# Patient Record
Sex: MALE | Race: WHITE | NOT HISPANIC OR LATINO | URBAN - METROPOLITAN AREA
[De-identification: names, ages, dates, MRNs, and addresses within clinical notes are randomized per-mention and may not be internally consistent; named-entity substitution may affect disease eponyms.]

---

## 2017-05-01 ENCOUNTER — AMBULATORY SURGICAL CENTER (OUTPATIENT)
Dept: URBAN - METROPOLITAN AREA SURGERY 9 | Facility: SURGERY | Age: 52
End: 2017-05-01
Payer: COMMERCIAL

## 2017-05-01 DIAGNOSIS — D12.3 BENIGN NEOPLASM OF TRANSVERSE COLON: ICD-10-CM

## 2017-05-01 DIAGNOSIS — Z12.11 ENCOUNTER FOR SCREENING FOR MALIGNANT NEOPLASM OF COLON: ICD-10-CM

## 2017-05-01 DIAGNOSIS — K63.5 POLYP OF COLON: ICD-10-CM

## 2017-05-01 PROCEDURE — 45380 COLONOSCOPY AND BIOPSY: CPT | Mod: 33

## 2017-05-01 PROCEDURE — 88305 TISSUE EXAM BY PATHOLOGIST: CPT | Performed by: INTERNAL MEDICINE

## 2017-05-02 ENCOUNTER — LAB REQUISITION (OUTPATIENT)
Dept: LAB | Facility: HOSPITAL | Age: 52
End: 2017-05-02

## 2017-05-02 DIAGNOSIS — Z12.11 ENCOUNTER FOR SCREENING FOR MALIGNANT NEOPLASM OF COLON: ICD-10-CM

## 2017-05-02 LAB
CYTO UR: NORMAL
LAB AP CASE REPORT: NORMAL
LAB AP CLINICAL INFORMATION: NORMAL
Lab: NORMAL
PATH REPORT.FINAL DX SPEC: NORMAL
PATH REPORT.GROSS SPEC: NORMAL

## 2017-07-31 ENCOUNTER — TRANSCRIBE ORDERS (OUTPATIENT)
Dept: ADMINISTRATIVE | Facility: HOSPITAL | Age: 52
End: 2017-07-31

## 2017-07-31 DIAGNOSIS — R07.9 CHEST PAIN, UNSPECIFIED: Primary | ICD-10-CM

## 2017-08-03 ENCOUNTER — HOSPITAL ENCOUNTER (OUTPATIENT)
Dept: CARDIOLOGY | Facility: HOSPITAL | Age: 52
Discharge: HOME | End: 2017-08-03
Attending: INTERNAL MEDICINE | Admitting: INTERNAL MEDICINE

## 2017-08-03 VITALS
BODY MASS INDEX: 23.7 KG/M2 | SYSTOLIC BLOOD PRESSURE: 132 MMHG | OXYGEN SATURATION: 100 % | DIASTOLIC BLOOD PRESSURE: 84 MMHG | HEART RATE: 55 BPM | WEIGHT: 175 LBS | HEIGHT: 72 IN | RESPIRATION RATE: 18 BRPM

## 2017-08-03 DIAGNOSIS — R07.9 CHEST PAIN, UNSPECIFIED: ICD-10-CM

## 2017-08-03 PROCEDURE — 93017 CV STRESS TEST TRACING ONLY: CPT

## 2017-08-03 PROCEDURE — 93016 CV STRESS TEST SUPVJ ONLY: CPT | Performed by: INTERNAL MEDICINE

## 2017-08-03 PROCEDURE — 93018 CV STRESS TEST I&R ONLY: CPT | Performed by: INTERNAL MEDICINE

## 2017-08-04 LAB
BH CV STRESS BP STAGE 1: NORMAL
BH CV STRESS BP STAGE 2: NORMAL
BH CV STRESS BP STAGE 3: NORMAL
BH CV STRESS BP STAGE 4: NORMAL
BH CV STRESS DURATION MIN STAGE 1: 3
BH CV STRESS DURATION MIN STAGE 2: 3
BH CV STRESS DURATION MIN STAGE 3: 3
BH CV STRESS DURATION MIN STAGE 4: 1
BH CV STRESS DURATION SEC STAGE 1: 0
BH CV STRESS DURATION SEC STAGE 2: 0
BH CV STRESS DURATION SEC STAGE 3: 0
BH CV STRESS DURATION SEC STAGE 4: 21
BH CV STRESS GRADE STAGE 1: 10
BH CV STRESS GRADE STAGE 2: 12
BH CV STRESS GRADE STAGE 3: 14
BH CV STRESS GRADE STAGE 4: 16
BH CV STRESS HR STAGE 1: 85
BH CV STRESS HR STAGE 2: 130
BH CV STRESS HR STAGE 3: 126
BH CV STRESS HR STAGE 4: 141
BH CV STRESS METS STAGE 1: 5
BH CV STRESS METS STAGE 2: 7.5
BH CV STRESS METS STAGE 3: 10
BH CV STRESS METS STAGE 4: 13.5
BH CV STRESS PROTOCOL 1: NORMAL
BH CV STRESS RECOVERY BP: NORMAL MMHG
BH CV STRESS RECOVERY HR: 79 BPM
BH CV STRESS SPEED STAGE 1: 1.7
BH CV STRESS SPEED STAGE 2: 2.5
BH CV STRESS SPEED STAGE 3: 3.4
BH CV STRESS SPEED STAGE 4: 4.2
BH CV STRESS STAGE 1: 1
BH CV STRESS STAGE 2: 2
BH CV STRESS STAGE 3: 3
BH CV STRESS STAGE 4: 4
MAXIMAL PREDICTED HEART RATE: 170 BPM
PERCENT MAX PREDICTED HR: 89.41 %
STRESS BASELINE BP: NORMAL MMHG
STRESS BASELINE HR: 55 BPM
STRESS O2 SAT REST: 100 %
STRESS PERCENT HR: 105 %
STRESS POST ESTIMATED WORKLOAD: 12.4 METS
STRESS POST EXERCISE DUR MIN: 10 MIN
STRESS POST EXERCISE DUR SEC: 22 SEC
STRESS POST PEAK BP: NORMAL MMHG
STRESS POST PEAK HR: 152 BPM
STRESS TARGET HR: 145 BPM

## 2017-08-07 ENCOUNTER — OFFICE VISIT (OUTPATIENT)
Dept: CARDIOLOGY | Facility: CLINIC | Age: 52
End: 2017-08-07

## 2017-08-07 VITALS
WEIGHT: 179 LBS | DIASTOLIC BLOOD PRESSURE: 80 MMHG | BODY MASS INDEX: 24.24 KG/M2 | SYSTOLIC BLOOD PRESSURE: 122 MMHG | HEIGHT: 72 IN | HEART RATE: 53 BPM

## 2017-08-07 DIAGNOSIS — I20.0 UNSTABLE ANGINA (HCC): Primary | ICD-10-CM

## 2017-08-07 DIAGNOSIS — E78.5 HYPERLIPIDEMIA, UNSPECIFIED HYPERLIPIDEMIA TYPE: ICD-10-CM

## 2017-08-07 PROBLEM — R07.2 PRECORDIAL PAIN: Status: ACTIVE | Noted: 2017-08-07

## 2017-08-07 PROBLEM — R07.2 PRECORDIAL PAIN: Status: RESOLVED | Noted: 2017-08-07 | Resolved: 2017-08-07

## 2017-08-07 PROCEDURE — 99205 OFFICE O/P NEW HI 60 MIN: CPT | Performed by: INTERNAL MEDICINE

## 2017-08-07 PROCEDURE — 93000 ELECTROCARDIOGRAM COMPLETE: CPT | Performed by: INTERNAL MEDICINE

## 2017-08-07 RX ORDER — ATORVASTATIN CALCIUM 20 MG/1
20 TABLET, FILM COATED ORAL DAILY
Qty: 90 TABLET | Refills: 3 | Status: SHIPPED | OUTPATIENT
Start: 2017-08-07 | End: 2017-08-09

## 2017-08-07 NOTE — PROGRESS NOTES
Date of Office Visit: 2017  Encounter Provider: Paula Hall MD  Place of Service: [facility] CARDIOLOGY  Patient Name: Abram Allen  :1965      Patient ID:  Abram Allen is a 51 y.o. male is here for  Chest pain.         History of Present Illness    Dear Myla is here for chest pain.    He's been an exerciser lifelong.  He enjoys running.  Beginning  of 2017, he noticed about 1.5 miles into his running he began having chest heaviness going into his back and shoulder.  It would be progressively severe enough for he would have to stop.  After stopping for 1-2 minutes, the pain would get better but not jorje completely.  He would continue his run with a mild amount of chest pressure.  He has noticed that his energy level is low but he only gets about 4-5 hours of sleep nightly.  Even now he's noticed a low level of chest pressure.  He did have a treadmill stress study done because of his complaint.  This was done 8/3/2017 and showed a complete left bundle branch block with repolarization changes during exercise as well as a 7 beat run of supraventricular tachycardia and chest pain similar to the pain that he been experiencing with running.  His baseline ECG did show an incomplete left bundle-branch block.    He's had no syncope or palpitations.  He has no history of diabetes mellitus or hypertension.  He does have hyperlipidemia which is untreated.  He's never had myocardial infarction or heart failure.  He has no history of cancer, kidney disease, asthma, emphysema, seizures, stroke, sleep apnea, blood clots.    He doesn't smoke.  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  He is  and has 3 children who are healthy.  He has 6-8 cups of coffee per day.  His maternal grandmother  with a myocardial infarction in her mid 80s.    Past Medical History:   Diagnosis Date   • Hypertension          Past  Surgical History:   Procedure Laterality Date   • SHOULDER SURGERY Left        No current outpatient prescriptions on file prior to visit.     No current facility-administered medications on file prior to visit.        Social History     Social History   • Marital status:      Spouse name: N/A   • Number of children: N/A   • Years of education: N/A     Occupational History   • Not on file.     Social History Main Topics   • Smoking status: Never Smoker   • Smokeless tobacco: Not on file      Comment: caffeine use   • Alcohol use No   • Drug use: No   • Sexual activity: Not on file     Other Topics Concern   • Not on file     Social History Narrative   • No narrative on file           Review of Systems   Constitution: Positive for malaise/fatigue.   HENT: Negative for congestion and headaches.    Eyes: Negative for vision loss in left eye and vision loss in right eye.   Cardiovascular: Positive for dyspnea on exertion.   Respiratory: Negative.  Negative for cough, hemoptysis, shortness of breath, sleep disturbances due to breathing, snoring, sputum production and wheezing.    Endocrine: Negative.    Hematologic/Lymphatic: Negative.    Skin: Negative for poor wound healing and rash.   Musculoskeletal: Negative for falls, gout, muscle cramps and myalgias.   Gastrointestinal: Negative for abdominal pain, diarrhea, dysphagia, hematemesis, melena, nausea and vomiting.   Neurological: Negative for excessive daytime sleepiness, dizziness, light-headedness, loss of balance, seizures and vertigo.   Psychiatric/Behavioral: Negative for depression and substance abuse. The patient is not nervous/anxious.        Procedures    ECG 12 Lead  Date/Time: 8/7/2017 3:37 PM  Performed by: SINDY BERGMAN  Authorized by: SINDY BERGMAN   Comparison: compared with previous ECG   Similar to previous ECG  Rhythm: sinus rhythm  Conduction: incomplete LBBB  Clinical impression: abnormal ECG               Objective:     "  Vitals:    08/07/17 1507 08/07/17 1512   BP: 118/84 122/80   BP Location: Right arm Left arm   Patient Position: Sitting Sitting   Pulse: 53    Weight: 179 lb (81.2 kg)    Height: 72\" (182.9 cm)      Body mass index is 24.28 kg/(m^2).    Physical Exam   Constitutional: He is oriented to person, place, and time. He appears well-developed and well-nourished. No distress.   HENT:   Head: Normocephalic and atraumatic.   Eyes: Conjunctivae are normal. No scleral icterus.   Neck: Neck supple. No JVD present. Carotid bruit is not present. No thyromegaly present.   Cardiovascular: Normal rate, regular rhythm, S1 normal, S2 normal, normal heart sounds and intact distal pulses.   No extrasystoles are present. PMI is not displaced.  Exam reveals no gallop.    No murmur heard.  Pulses:       Carotid pulses are 2+ on the right side, and 2+ on the left side.       Radial pulses are 2+ on the right side, and 2+ on the left side.        Dorsalis pedis pulses are 2+ on the right side, and 2+ on the left side.        Posterior tibial pulses are 2+ on the right side, and 2+ on the left side.   Pulmonary/Chest: Effort normal and breath sounds normal. No respiratory distress. He has no wheezes. He has no rhonchi. He has no rales. He exhibits no tenderness.   Abdominal: Soft. Bowel sounds are normal. He exhibits no distension, no abdominal bruit and no mass. There is no tenderness.   Musculoskeletal: He exhibits no edema or deformity.   Lymphadenopathy:     He has no cervical adenopathy.   Neurological: He is alert and oriented to person, place, and time. No cranial nerve deficit.   Skin: Skin is warm and dry. No rash noted. He is not diaphoretic. No cyanosis. No pallor. Nails show no clubbing.   Psychiatric: He has a normal mood and affect. Judgment normal.   Vitals reviewed.      Lab Review:       Assessment:      Diagnosis Plan   1. Unstable angina  Case Request Cath Lab: Coronary angiography   2. Hyperlipidemia, unspecified " hyperlipidemia type  Lipid Panel    Comprehensive Metabolic Panel    CBC (No Diff)    Case Request Cath Lab: Coronary angiography     1. Chest pain consistent with unstable angina and a LBBB on stress study as well as chest pressure, set up catheterization.  Risks and benefits explained.   2. Hyperlipidemia, untreated.   3. iLBBB on ECG.   4. High stress job with little sleep.     Plan:       See back in 4 weeks, start ASA 81mg daily and atorvastatin 20mg nightly.

## 2017-08-08 ENCOUNTER — LAB (OUTPATIENT)
Dept: LAB | Facility: HOSPITAL | Age: 52
End: 2017-08-08
Attending: INTERNAL MEDICINE

## 2017-08-08 DIAGNOSIS — E78.5 HYPERLIPIDEMIA, UNSPECIFIED HYPERLIPIDEMIA TYPE: ICD-10-CM

## 2017-08-08 LAB
ALBUMIN SERPL-MCNC: 4.4 G/DL (ref 3.5–5.2)
ALBUMIN/GLOB SERPL: 1.9 G/DL
ALP SERPL-CCNC: 50 U/L (ref 40–129)
ALT SERPL W P-5'-P-CCNC: 16 U/L (ref 5–41)
ANION GAP SERPL CALCULATED.3IONS-SCNC: 10.3 MMOL/L
AST SERPL-CCNC: 14 U/L (ref 5–40)
BILIRUB SERPL-MCNC: 0.5 MG/DL (ref 0.2–1.2)
BUN BLD-MCNC: 18 MG/DL (ref 6–20)
BUN/CREAT SERPL: 19.8 (ref 7–25)
CALCIUM SPEC-SCNC: 9 MG/DL (ref 8.6–10.5)
CHLORIDE SERPL-SCNC: 101 MMOL/L (ref 98–107)
CHOLEST SERPL-MCNC: 198 MG/DL (ref 0–200)
CO2 SERPL-SCNC: 26.7 MMOL/L (ref 22–29)
CREAT BLD-MCNC: 0.91 MG/DL (ref 0.76–1.27)
DEPRECATED RDW RBC AUTO: 37.8 FL (ref 37–54)
ERYTHROCYTE [DISTWIDTH] IN BLOOD BY AUTOMATED COUNT: 11.9 % (ref 11.5–14.5)
GFR SERPL CREATININE-BSD FRML MDRD: 88 ML/MIN/1.73
GLOBULIN UR ELPH-MCNC: 2.3 GM/DL
GLUCOSE BLD-MCNC: 99 MG/DL (ref 65–99)
HCT VFR BLD AUTO: 43.5 % (ref 42–52)
HDLC SERPL-MCNC: 48 MG/DL (ref 40–60)
HGB BLD-MCNC: 14.4 G/DL (ref 14–18)
LDLC SERPL CALC-MCNC: 135 MG/DL (ref 0–100)
LDLC/HDLC SERPL: 2.82 {RATIO}
MCH RBC QN AUTO: 29.3 PG (ref 27–31)
MCHC RBC AUTO-ENTMCNC: 33.1 G/DL (ref 31–37)
MCV RBC AUTO: 88.4 FL (ref 80–94)
PLATELET # BLD AUTO: 223 10*3/MM3 (ref 140–500)
PMV BLD AUTO: 11 FL (ref 7.4–10.4)
POTASSIUM BLD-SCNC: 3.9 MMOL/L (ref 3.5–5.2)
PROT SERPL-MCNC: 6.7 G/DL (ref 6–8.5)
RBC # BLD AUTO: 4.92 10*6/MM3 (ref 4.7–6.1)
SODIUM BLD-SCNC: 138 MMOL/L (ref 136–145)
TRIGL SERPL-MCNC: 74 MG/DL (ref 0–150)
VLDLC SERPL-MCNC: 14.8 MG/DL (ref 8–32)
WBC NRBC COR # BLD: 6.3 10*3/MM3 (ref 4.8–10.8)

## 2017-08-08 PROCEDURE — 80053 COMPREHEN METABOLIC PANEL: CPT | Performed by: INTERNAL MEDICINE

## 2017-08-08 PROCEDURE — 36415 COLL VENOUS BLD VENIPUNCTURE: CPT | Performed by: INTERNAL MEDICINE

## 2017-08-08 PROCEDURE — 85027 COMPLETE CBC AUTOMATED: CPT

## 2017-08-08 PROCEDURE — 80061 LIPID PANEL: CPT

## 2017-08-09 RX ORDER — ATORVASTATIN CALCIUM 20 MG/1
20 TABLET, FILM COATED ORAL DAILY
COMMUNITY
End: 2018-07-25 | Stop reason: SDUPTHER

## 2017-08-09 NOTE — PERIOPERATIVE NURSING NOTE
CALLED AND LEFT MSG ON VM, REMINDED OF ARRIVAL TIME AND NEED TO BE NPO AFTER [place]. MAY TAKE AM MEDS WITH A SIP OF WATER AND TOLD TO HAVE A RESPONSIBLE  IN THE EVENT OF D/C HOME

## 2017-08-10 ENCOUNTER — HOSPITAL ENCOUNTER (OUTPATIENT)
Facility: HOSPITAL | Age: 52
Setting detail: HOSPITAL OUTPATIENT SURGERY
Discharge: HOME | End: 2017-08-10
Attending: INTERNAL MEDICINE | Admitting: INTERNAL MEDICINE

## 2017-08-10 VITALS
RESPIRATION RATE: 16 BRPM | TEMPERATURE: 98.4 F | BODY MASS INDEX: 24.11 KG/M2 | HEART RATE: 53 BPM | OXYGEN SATURATION: 99 % | HEIGHT: 72 IN | SYSTOLIC BLOOD PRESSURE: 121 MMHG | DIASTOLIC BLOOD PRESSURE: 71 MMHG | WEIGHT: 178 LBS

## 2017-08-10 DIAGNOSIS — I20.0 UNSTABLE ANGINA (HCC): ICD-10-CM

## 2017-08-10 DIAGNOSIS — E78.5 HYPERLIPIDEMIA, UNSPECIFIED HYPERLIPIDEMIA TYPE: ICD-10-CM

## 2017-08-10 LAB — ACT BLD: 279 SECONDS (ref 82–152)

## 2017-08-10 PROCEDURE — 93010 ELECTROCARDIOGRAM REPORT: CPT | Performed by: INTERNAL MEDICINE

## 2017-08-10 PROCEDURE — C1725 CATH, TRANSLUMIN NON-LASER: HCPCS | Performed by: INTERNAL MEDICINE

## 2017-08-10 PROCEDURE — C1769 GUIDE WIRE: HCPCS | Performed by: INTERNAL MEDICINE

## 2017-08-10 PROCEDURE — 93458 L HRT ARTERY/VENTRICLE ANGIO: CPT | Performed by: INTERNAL MEDICINE

## 2017-08-10 PROCEDURE — 25010000002 FENTANYL CITRATE (PF) 100 MCG/2ML SOLUTION: Performed by: INTERNAL MEDICINE

## 2017-08-10 PROCEDURE — 92928 PRQ TCAT PLMT NTRAC ST 1 LES: CPT | Performed by: INTERNAL MEDICINE

## 2017-08-10 PROCEDURE — 25010000002 MIDAZOLAM PER 1 MG: Performed by: INTERNAL MEDICINE

## 2017-08-10 PROCEDURE — C1894 INTRO/SHEATH, NON-LASER: HCPCS | Performed by: INTERNAL MEDICINE

## 2017-08-10 PROCEDURE — 0 IOPAMIDOL PER 1 ML: Performed by: INTERNAL MEDICINE

## 2017-08-10 PROCEDURE — C9600 PERC DRUG-EL COR STENT SING: HCPCS | Performed by: INTERNAL MEDICINE

## 2017-08-10 PROCEDURE — C1887 CATHETER, GUIDING: HCPCS | Performed by: INTERNAL MEDICINE

## 2017-08-10 PROCEDURE — 25010000002 HEPARIN (PORCINE) PER 1000 UNITS: Performed by: INTERNAL MEDICINE

## 2017-08-10 PROCEDURE — C1874 STENT, COATED/COV W/DEL SYS: HCPCS | Performed by: INTERNAL MEDICINE

## 2017-08-10 PROCEDURE — 85347 COAGULATION TIME ACTIVATED: CPT

## 2017-08-10 PROCEDURE — 93005 ELECTROCARDIOGRAM TRACING: CPT | Performed by: INTERNAL MEDICINE

## 2017-08-10 DEVICE — XIENCE ALPINE EVEROLIMUS ELUTING CORONARY STENT SYSTEM 3.50 MM X 18 MM / RAPID-EXCHANGE
Type: IMPLANTABLE DEVICE | Site: CORONARY | Status: FUNCTIONAL
Brand: XIENCE ALPINE

## 2017-08-10 RX ORDER — VERAPAMIL HYDROCHLORIDE 2.5 MG/ML
INJECTION, SOLUTION INTRAVENOUS AS NEEDED
Status: DISCONTINUED | OUTPATIENT
Start: 2017-08-10 | End: 2017-08-10 | Stop reason: HOSPADM

## 2017-08-10 RX ORDER — LIDOCAINE HYDROCHLORIDE 10 MG/ML
0.1 INJECTION, SOLUTION EPIDURAL; INFILTRATION; INTRACAUDAL; PERINEURAL ONCE AS NEEDED
Status: DISCONTINUED | OUTPATIENT
Start: 2017-08-10 | End: 2017-08-10 | Stop reason: HOSPADM

## 2017-08-10 RX ORDER — SODIUM CHLORIDE 0.9 % (FLUSH) 0.9 %
1-10 SYRINGE (ML) INJECTION AS NEEDED
Status: CANCELLED | OUTPATIENT
Start: 2017-08-10

## 2017-08-10 RX ORDER — ASPIRIN 325 MG
TABLET ORAL AS NEEDED
Status: DISCONTINUED | OUTPATIENT
Start: 2017-08-10 | End: 2017-08-10 | Stop reason: HOSPADM

## 2017-08-10 RX ORDER — MIDAZOLAM HYDROCHLORIDE 1 MG/ML
INJECTION INTRAMUSCULAR; INTRAVENOUS AS NEEDED
Status: DISCONTINUED | OUTPATIENT
Start: 2017-08-10 | End: 2017-08-10 | Stop reason: HOSPADM

## 2017-08-10 RX ORDER — LIDOCAINE HYDROCHLORIDE 20 MG/ML
INJECTION, SOLUTION INFILTRATION; PERINEURAL AS NEEDED
Status: DISCONTINUED | OUTPATIENT
Start: 2017-08-10 | End: 2017-08-10 | Stop reason: HOSPADM

## 2017-08-10 RX ORDER — SODIUM CHLORIDE 9 MG/ML
INJECTION, SOLUTION INTRAVENOUS CONTINUOUS PRN
Status: DISCONTINUED | OUTPATIENT
Start: 2017-08-10 | End: 2017-08-10 | Stop reason: HOSPADM

## 2017-08-10 RX ORDER — SODIUM CHLORIDE 9 MG/ML
100 INJECTION, SOLUTION INTRAVENOUS CONTINUOUS
Status: CANCELLED | OUTPATIENT
Start: 2017-08-10 | End: 2017-08-10

## 2017-08-10 RX ORDER — SODIUM CHLORIDE 9 MG/ML
75 INJECTION, SOLUTION INTRAVENOUS CONTINUOUS
Status: DISCONTINUED | OUTPATIENT
Start: 2017-08-10 | End: 2017-08-10 | Stop reason: HOSPADM

## 2017-08-10 RX ORDER — FENTANYL CITRATE 50 UG/ML
INJECTION, SOLUTION INTRAMUSCULAR; INTRAVENOUS AS NEEDED
Status: DISCONTINUED | OUTPATIENT
Start: 2017-08-10 | End: 2017-08-10 | Stop reason: HOSPADM

## 2017-08-10 RX ORDER — NITROGLYCERIN 5 MG/ML
INJECTION, SOLUTION INTRAVENOUS AS NEEDED
Status: DISCONTINUED | OUTPATIENT
Start: 2017-08-10 | End: 2017-08-10 | Stop reason: HOSPADM

## 2017-08-10 RX ORDER — HEPARIN SODIUM 1000 [USP'U]/ML
INJECTION, SOLUTION INTRAVENOUS; SUBCUTANEOUS AS NEEDED
Status: DISCONTINUED | OUTPATIENT
Start: 2017-08-10 | End: 2017-08-10 | Stop reason: HOSPADM

## 2017-08-10 RX ORDER — SODIUM CHLORIDE 0.9 % (FLUSH) 0.9 %
1-10 SYRINGE (ML) INJECTION AS NEEDED
Status: DISCONTINUED | OUTPATIENT
Start: 2017-08-10 | End: 2017-08-10 | Stop reason: HOSPADM

## 2017-08-10 RX ADMIN — SODIUM CHLORIDE 75 ML/HR: 9 INJECTION, SOLUTION INTRAVENOUS at 08:42

## 2017-08-10 NOTE — PLAN OF CARE
Problem: Patient Care Overview (Adult)  Goal: Plan of Care Review  Outcome: Outcome(s) achieved Date Met:  08/10/17  Goal: Adult Individualization and Mutuality  Outcome: Outcome(s) achieved Date Met:  08/10/17  Goal: Discharge Needs Assessment  Outcome: Outcome(s) achieved Date Met:  08/10/17    Problem: Cardiac Catheterization with/without PCI (Adult)  Goal: Signs and Symptoms of Listed Potential Problems Will be Absent or Manageable (Cardiac Catheterization with/without PCI)  Outcome: Outcome(s) achieved Date Met:  08/10/17

## 2017-08-10 NOTE — PERIOPERATIVE NURSING NOTE
Cardiac rehab at bedside with patient and wife.  Patient has rehab set up at [facility]

## 2017-08-10 NOTE — DISCHARGE INSTRUCTIONS
SEDATION DISCHARGE INSTRUCTIONS.    IMPORTANT: The following information will help you return to your best level of health.  Sedation.  You have had a procedure that called for some medicine to reduce anxiety and pain. This medicine (or medicines) is called  sedation. After receiving the medicine, you may be sleepy, but able to breathe on your own. The effects of the medicine may last for several hours.    Follow these instructions after sedation:   Go right home. Rest quietly at home today, then you can be up and about.   Do not drink alcohol, drive or operate machinery for 24 hours.   Do not do anything where light-headedness or clumsiness would be dangerous.   Do not make important decisions or sign any legal papers for the next 24 hours.   Make sure A RESPONSIBLE PERSON stays with you the rest of today and overnight for your protection and safety.   Start your diet with fluids and light foods (jello, soup, juice, toast). Then, slowly progress to your usual diet if you are not sick to your stomach.    Call your doctor if you have:   a gray or blue skin color.   excess sleepiness.   repeated vomiting.   trouble breathing.   any new problems or concerns.        [facility]  [address] [place] [address]    Coronary Angiogram with Stent (Radial Approach) After Care    Refer to this sheet in the next few weeks. These instructions provide you with information on caring for yourself after your procedure. Your health care provider may also give you more specific instructions. Your treatment has been planned according to current medical practices, but problems sometimes occur. Call your health care provider if you have any problems or questions after your procedure.       Home Care Instructions:  · You may shower the day after the procedure. Remove the bandage (dressing) and gently wash the site with plain soap and water. Gently pat the site dry. You may apply a band aid daily for 2 days if desired.     · Do not apply powder or lotion to the site.  · Do not submerge the affected site in water for 3 to 5 days or until the site is completely healed.   · Do not flex or bend the affected arm for 24 hours.  · Do not lift, push or pull anything over 10 pounds for 2 days after your procedure.  · Do not operate machinery or power tools for 24 hours.  · Inspect the site at least twice daily. You may notice some bruising at the site and it may be tender for 1 to 2 weeks.     · Increase your fluid intake for the next 2 days.    · Keep arm elevated for 24 hours.  For the remainder of the day, keep your arm in the “Pledge of Allegiance” position when up and about.    · Limit your activity for the next 48 hours and avoid strenuous activity as directed by your physician.   · Cardiac Rehab may or may not be ordered.  Please consult with your physician  · You may drive 24 hours after the procedure unless otherwise instructed by your caregiver.  · A responsible adult should be with you for the first 24 hours after you arrive home.   · Do not make any important legal decisions or sign legal papers for 24 hours.    · Take medicines only as directed by your health care provider.  Blood thinners may be prescribed after your procedure to improve blood flow through the stent.    · Eat a heart-healthy diet. This should include plenty of fresh fruits and vegetables. Meat should be lean cuts. Avoid the following types of food:    ¨ Food that is high in salt.    ¨ Canned or highly processed food.    ¨ Food that is high in saturated fat or sugar.    ¨ Fried food.    · Make any other lifestyle changes recommended by your health care provider. This may include:    ¨ Not using any tobacco products including cigarettes, chewing tobacco, or electronic cigarettes.   ¨ Managing your weight.    ¨ Getting regular exercise.    ¨ Managing your blood pressure.    ¨ Limiting your alcohol intake.    ¨ Managing other health problems, such as diabetes.     · If you need an MRI after your heart stent was placed, be sure to tell the health care provider who orders the MRI that you have a heart stent.    · Keep all follow-up visits as directed by your health care provider.    ·   Call Your Doctor If:  · You have unusual pain at the radial/ulnar (wrist) site.  · You have redness, warmth, swelling, or pain at the radial/ulnar (wrist) site.  · You have drainage (other than a small amount of blood on the dressing).  · `You have chills or a fever > 101.  · Your arm becomes pale or dark, cool, tingly, or numb.  · You develop chest pain, shortness of breath, feel faint or pass out.    · You have heavy bleeding from the site, hold pressure on the site for 20 minutes.  If the bleeding stops, apply a fresh bandage and call your cardiologist.  However, if you continue to have bleeding, call 911.        Make Sure You:   · Understand these instructions.  · Will watch your condition.  · Will get help right away if you are not doing well or get worse.

## 2017-08-10 NOTE — PERIOPERATIVE NURSING NOTE
Dr Black at bedside to explain results of procedure.  Asked Dr Black if she wanted post procedure EKG.  EKG ordered.

## 2017-08-10 NOTE — H&P (VIEW-ONLY)
Date of Office Visit: 2017  Encounter Provider: Paula Hall MD  Place of Service: [facility] CARDIOLOGY  Patient Name: Abram Allen  :1965      Patient ID:  Abram Allen is a 51 y.o. male is here for  Chest pain.         History of Present Illness    Dear Myla is here for chest pain.    He's been an exerciser lifelong.  He enjoys running.  Beginning  of 2017, he noticed about 1.5 miles into his running he began having chest heaviness going into his back and shoulder.  It would be progressively severe enough for he would have to stop.  After stopping for 1-2 minutes, the pain would get better but not jorje completely.  He would continue his run with a mild amount of chest pressure.  He has noticed that his energy level is low but he only gets about 4-5 hours of sleep nightly.  Even now he's noticed a low level of chest pressure.  He did have a treadmill stress study done because of his complaint.  This was done 8/3/2017 and showed a complete left bundle branch block with repolarization changes during exercise as well as a 7 beat run of supraventricular tachycardia and chest pain similar to the pain that he been experiencing with running.  His baseline ECG did show an incomplete left bundle-branch block.    He's had no syncope or palpitations.  He has no history of diabetes mellitus or hypertension.  He does have hyperlipidemia which is untreated.  He's never had myocardial infarction or heart failure.  He has no history of cancer, kidney disease, asthma, emphysema, seizures, stroke, sleep apnea, blood clots.    He doesn't smoke.  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  He is  and has 3 children who are healthy.  He has 6-8 cups of coffee per day.  His maternal grandmother  with a myocardial infarction in her mid 80s.    Past Medical History:   Diagnosis Date   • Hypertension          Past  Surgical History:   Procedure Laterality Date   • SHOULDER SURGERY Left        No current outpatient prescriptions on file prior to visit.     No current facility-administered medications on file prior to visit.        Social History     Social History   • Marital status:      Spouse name: N/A   • Number of children: N/A   • Years of education: N/A     Occupational History   • Not on file.     Social History Main Topics   • Smoking status: Never Smoker   • Smokeless tobacco: Not on file      Comment: caffeine use   • Alcohol use No   • Drug use: No   • Sexual activity: Not on file     Other Topics Concern   • Not on file     Social History Narrative   • No narrative on file           Review of Systems   Constitution: Positive for malaise/fatigue.   HENT: Negative for congestion and headaches.    Eyes: Negative for vision loss in left eye and vision loss in right eye.   Cardiovascular: Positive for dyspnea on exertion.   Respiratory: Negative.  Negative for cough, hemoptysis, shortness of breath, sleep disturbances due to breathing, snoring, sputum production and wheezing.    Endocrine: Negative.    Hematologic/Lymphatic: Negative.    Skin: Negative for poor wound healing and rash.   Musculoskeletal: Negative for falls, gout, muscle cramps and myalgias.   Gastrointestinal: Negative for abdominal pain, diarrhea, dysphagia, hematemesis, melena, nausea and vomiting.   Neurological: Negative for excessive daytime sleepiness, dizziness, light-headedness, loss of balance, seizures and vertigo.   Psychiatric/Behavioral: Negative for depression and substance abuse. The patient is not nervous/anxious.        Procedures    ECG 12 Lead  Date/Time: 8/7/2017 3:37 PM  Performed by: SINDY BERGMAN  Authorized by: SINDY BERGMAN   Comparison: compared with previous ECG   Similar to previous ECG  Rhythm: sinus rhythm  Conduction: incomplete LBBB  Clinical impression: abnormal ECG               Objective:     "  Vitals:    08/07/17 1507 08/07/17 1512   BP: 118/84 122/80   BP Location: Right arm Left arm   Patient Position: Sitting Sitting   Pulse: 53    Weight: 179 lb (81.2 kg)    Height: 72\" (182.9 cm)      Body mass index is 24.28 kg/(m^2).    Physical Exam   Constitutional: He is oriented to person, place, and time. He appears well-developed and well-nourished. No distress.   HENT:   Head: Normocephalic and atraumatic.   Eyes: Conjunctivae are normal. No scleral icterus.   Neck: Neck supple. No JVD present. Carotid bruit is not present. No thyromegaly present.   Cardiovascular: Normal rate, regular rhythm, S1 normal, S2 normal, normal heart sounds and intact distal pulses.   No extrasystoles are present. PMI is not displaced.  Exam reveals no gallop.    No murmur heard.  Pulses:       Carotid pulses are 2+ on the right side, and 2+ on the left side.       Radial pulses are 2+ on the right side, and 2+ on the left side.        Dorsalis pedis pulses are 2+ on the right side, and 2+ on the left side.        Posterior tibial pulses are 2+ on the right side, and 2+ on the left side.   Pulmonary/Chest: Effort normal and breath sounds normal. No respiratory distress. He has no wheezes. He has no rhonchi. He has no rales. He exhibits no tenderness.   Abdominal: Soft. Bowel sounds are normal. He exhibits no distension, no abdominal bruit and no mass. There is no tenderness.   Musculoskeletal: He exhibits no edema or deformity.   Lymphadenopathy:     He has no cervical adenopathy.   Neurological: He is alert and oriented to person, place, and time. No cranial nerve deficit.   Skin: Skin is warm and dry. No rash noted. He is not diaphoretic. No cyanosis. No pallor. Nails show no clubbing.   Psychiatric: He has a normal mood and affect. Judgment normal.   Vitals reviewed.      Lab Review:       Assessment:      Diagnosis Plan   1. Unstable angina  Case Request Cath Lab: Coronary angiography   2. Hyperlipidemia, unspecified " hyperlipidemia type  Lipid Panel    Comprehensive Metabolic Panel    CBC (No Diff)    Case Request Cath Lab: Coronary angiography     1. Chest pain consistent with unstable angina and a LBBB on stress study as well as chest pressure, set up catheterization.  Risks and benefits explained.   2. Hyperlipidemia, untreated.   3. iLBBB on ECG.   4. High stress job with little sleep.     Plan:       See back in 4 weeks, start ASA 81mg daily and atorvastatin 20mg nightly.

## 2017-08-10 NOTE — NURSING NOTE
Met with pt & wife in Phase II recovery s/p stent placement. Discussed benefits of cardiac rehab and provided Phase II information packet which includes [facility] Cardiac Rehab Programs handout and two [place] [facility] Letter articles that stress the importance of cardiac rehab after a heart event.  He lives in [place] and actually works at [employer] and will plan to do his cardiac rehab there, so I provided contact information for that program.  He is over the rehab department at [employer], which does not include cardiac rehab, but he states that he has actually already talked to Saskia in cardiac rehab about his situation even prior to his cath today.    Encouraged to call as soon as possible after discharge to set up his first appointment.  Also encouraged pt to call his insurance company to determine if he has any co-pays or deductibles and to take his discharge instructions (AVS) from this hospitalization to his first cardiac rehab appointment.  Provided my name & phone number if pt/wife have any questions later.

## 2017-08-18 ENCOUNTER — OFFICE VISIT (OUTPATIENT)
Dept: CARDIOLOGY | Facility: CLINIC | Age: 52
End: 2017-08-18

## 2017-08-18 VITALS
WEIGHT: 180 LBS | SYSTOLIC BLOOD PRESSURE: 114 MMHG | HEIGHT: 72 IN | DIASTOLIC BLOOD PRESSURE: 80 MMHG | HEART RATE: 60 BPM | BODY MASS INDEX: 24.38 KG/M2

## 2017-08-18 DIAGNOSIS — I25.10 CORONARY ARTERY DISEASE INVOLVING NATIVE CORONARY ARTERY OF NATIVE HEART WITHOUT ANGINA PECTORIS: Primary | ICD-10-CM

## 2017-08-18 DIAGNOSIS — E78.5 HYPERLIPIDEMIA, UNSPECIFIED HYPERLIPIDEMIA TYPE: ICD-10-CM

## 2017-08-18 DIAGNOSIS — R07.89 CHEST TIGHTNESS: ICD-10-CM

## 2017-08-18 PROBLEM — I20.0 UNSTABLE ANGINA: Status: RESOLVED | Noted: 2017-08-07 | Resolved: 2017-08-18

## 2017-08-18 PROCEDURE — 99214 OFFICE O/P EST MOD 30 MIN: CPT | Performed by: NURSE PRACTITIONER

## 2017-08-18 PROCEDURE — 93000 ELECTROCARDIOGRAM COMPLETE: CPT | Performed by: NURSE PRACTITIONER

## 2017-08-18 NOTE — PROGRESS NOTES
Date of Office Visit: 2017  Encounter Provider: LISA Rubin  Place of Service: [facility] CARDIOLOGY  Patient Name: Abram Allen  :1965    Chief Complaint   Patient presents with   • Coronary Artery Disease   :     HPI: Abram Allen is a 51 y.o. male who presents today for one week hospital follow-up for her newly diagnosed coronary artery disease.  He is an established patient of Dr. Paula Hall.  He is new to me and his previous records have been reviewed.  He is a lifelong exerciser and enjoys running.  At the end of July he noticed some one of his friends that about 1.5 miles into running he began experiencing chest heaviness that radiated to his back and shoulder.  The pain progressively became more severe and he would need to stop running.  The pain would improve after one or 2 minutes, but did not resolve completely.  He had a treadmill stress test on 8/3/17 which showed a complete left bundle branch block with repolarization changes during exercise and a 7 beat run of SVT.  He also experienced chest pain similar to the pain experience with running.    Dr. Hall recommended a left heart catheterization which was completed by Dr. Black on 8/10/17.  The results are as follows: Left main normal, LAD scattered 10% proximal stenosis, mid LAD 95% concentric stenosis near focal area of calcification, left circumflex normal, first obtuse marginal branch proximal 10% stenosis, and right coronary artery with a 10% proximal and a 30% mid stenosis.  He underwent PCI with a Xience Alpine drug-eluting stent placement 3.5×18 mm to the mid LAD.  He was recommended to continue with dual antiplatelet therapy for one year.    Mr. Allen has not been exercising since leaving the hospital.  He did go back to work and this past Tuesday and Wednesday evening after working throughout the day he developed some mild chest tightness in the evening.  He said this was  different than the chest pain that he had when exerting himself before the stent placement.  He denies any further chest tightness today.  He has developed a headache since he is stopped drinking caffeine.  He denies shortness of air, paroxysmal nocturnal dyspnea, orthopnea, cough, wheezing, edema, palpitations, dizziness, or syncope.    Past Medical History:   Diagnosis Date   • Coronary artery disease    • Hyperlipidemia    • Left bundle branch block        Past Surgical History:   Procedure Laterality Date   • ADENOIDECTOMY     • CARDIAC CATHETERIZATION N/A 8/10/2017    by Eneida Black MD; Left main normal, LAD scattered 10% proximal stenosis, mid LAD 95% concentric stenosis near focal area of calcification, left circumflex normal, first obtuse marginal branch proximal 10% stenosis, and right coronary artery with a 10% proximal and a 30% mid stenosis   • CARDIAC CATHETERIZATION N/A 8/10/2017    by Eneida Black MD; Xience Alpine drug-eluting stent placement 3.5×18 mm to the mid LAD     • SHOULDER SURGERY Left        Social History     Social History   • Marital status:      Spouse name: N/A   • Number of children: N/A   • Years of education: N/A     Occupational History   • Not on file.     Social History Main Topics   • Smoking status: Never Smoker   • Smokeless tobacco: Not on file      Comment: caffeine use   • Alcohol use No   • Drug use: No   • Sexual activity: Defer     Other Topics Concern   • Not on file     Social History Narrative       Family History   Problem Relation Age of Onset   • Cancer Father    • Heart attack Maternal Grandmother    • Cancer Paternal Grandmother        Review of Systems   Constitution: Negative for chills, diaphoresis, fever, malaise/fatigue, night sweats, weight gain and weight loss.   HENT: Positive for headaches. Negative for hearing loss, nosebleeds, sore throat and tinnitus.    Eyes: Negative for blurred vision, double vision, pain and visual disturbance.  "  Cardiovascular: Positive for chest pain. Negative for claudication, cyanosis, dyspnea on exertion, irregular heartbeat, leg swelling, near-syncope, orthopnea, palpitations, paroxysmal nocturnal dyspnea and syncope.   Respiratory: Negative for cough, hemoptysis, shortness of breath, snoring and wheezing.    Endocrine: Negative for cold intolerance, heat intolerance and polyuria.   Hematologic/Lymphatic: Negative for bleeding problem. Does not bruise/bleed easily.   Skin: Negative for color change, dry skin, flushing and itching.   Musculoskeletal: Negative for falls, joint pain, joint swelling, muscle cramps, muscle weakness and myalgias.   Gastrointestinal: Negative for abdominal pain, constipation, heartburn, melena, nausea and vomiting.   Genitourinary: Negative for dysuria and hematuria.   Neurological: Negative for excessive daytime sleepiness, dizziness, light-headedness, loss of balance, numbness, paresthesias, seizures and vertigo.   Psychiatric/Behavioral: Negative for altered mental status, depression, memory loss and substance abuse. The patient does not have insomnia and is not nervous/anxious.    Allergic/Immunologic: Negative for environmental allergies.       No Known Allergies      Current Outpatient Prescriptions:   •  aspirin 81 MG tablet, Take 1 tablet by mouth Daily., Disp: 90 tablet, Rfl: 3  •  atorvastatin (LIPITOR) 20 MG tablet, Take 20 mg by mouth Daily., Disp: , Rfl:   •  ticagrelor (BRILINTA) 90 MG tablet tablet, Take 1 tablet by mouth 2 (Two) Times a Day., Disp: 60 tablet, Rfl: 11     Objective:     Vitals:    08/18/17 0844   BP: 114/80   Pulse: 60   Weight: 180 lb (81.6 kg)   Height: 72\" (182.9 cm)     Body mass index is 24.41 kg/(m^2).    PHYSICAL EXAM:    Vitals Reviewed.   General Appearance: No acute distress, well developed and well nourished.   Eyes: Conjunctiva and lids: No erythema, swelling, or discharge. Sclera non-icteric.   HENT: Atraumatic, normocephalic. External eyes, " ears, and nose normal. No hearing loss noted. Mucous membranes normal. Lips not cyanotic. Neck supple with no tenderness.  Respiratory: No signs of respiratory distress. Respiration rhythm and depth normal.   Clear to auscultation. No rales, crackles, rhonchi, or wheezing auscultated.   Cardiovascular:  Jugular Venous Pressure: Normal  Heart Rate and Rhythm: Normal, Heart Sounds: Normal S1 and S2. No S3 or S4 noted.  Murmurs: No murmurs noted. No rubs, thrills, or gallops.   Arterial Pulses: Carotid pulses normal. No carotid bruit noted. Posterior tibialis and dorsalis pedis pulses normal.   Lower Extremities: No edema noted.  Gastrointestinal:  Abdomen soft, non-distended, non-tender. Normal bowel sounds. No hepatomegaly.   Musculoskeletal: Normal movement of extremities  Skin and Nails: General appearance normal. No pallor, cyanosis, diaphoresis. Skin temperature normal. No clubbing of fingernails.   Psychiatric: Patient alert and oriented to person, place, and time. Speech and behavior appropriate. Normal mood and affect.       ECG 12 Lead  Date/Time: 8/18/2017 8:41 AM  Performed by: JADA AGUIRRE  Authorized by: JADA AGUIRRE   Comparison: compared with previous ECG from 8/10/2017  Similar to previous ECG  Rhythm: sinus rhythm  Rate: normal  BPM: 60  Conduction: left bundle branch block  ST Segments: ST segments normal  T Waves: T waves normal  QRS axis: normal  Other: no other findings  Clinical impression: abnormal ECG              Assessment:       Diagnosis Plan   1. Coronary artery disease involving native coronary artery of native heart without angina pectoris  Treadmill Stress Test    Ambulatory Referral to Cardiac Rehab   2. Chest tightness  Treadmill Stress Test    Ambulatory Referral to Cardiac Rehab   3. Hyperlipidemia, unspecified hyperlipidemia type  Treadmill Stress Test    Ambulatory Referral to Cardiac Rehab          Plan:       1. Coronary Artery Disease  Plan  • Lifestyle modifications  discussed include adhering to a heart healthy diet, avoidance of tobacco products, maintenance of a healthy weight, medication compliance, regular exercise and regular monitoring of cholesterol and blood pressure    Subjective - Objective  • There has been a previous stent procedure using PADMINI  • Current antiplatelet therapy includes aspirin 81 mg and ticagrelor 90 mg  •   Mr. Allen had 2 nights of chest tightness after he had been back to work earlier that day.  He has not been exercising said he doesn't know if he would experience exertional chest pain.  I discussed the plan care Dr. Paula Hall.  The patient will have a repeat treadmill stress test and if that is normal he will be able to enroll in the cardiac rehabilitation program at [place].  We had a long discussion about the events of the hospitalization, his current medication regimen, and future plan of care.  He will continue on aspirin and Brilinta for one year.    2.  Hyperlipidemia: He was recently started on atorvastatin.    As always, it has been a pleasure to participate in your patient's care.      Sincerely,         LISA Ivey

## 2017-08-21 ENCOUNTER — HOSPITAL ENCOUNTER (OUTPATIENT)
Dept: CARDIOLOGY | Facility: HOSPITAL | Age: 52
Discharge: HOME | End: 2017-08-21
Admitting: NURSE PRACTITIONER

## 2017-08-21 VITALS
DIASTOLIC BLOOD PRESSURE: 96 MMHG | OXYGEN SATURATION: 100 % | BODY MASS INDEX: 24.38 KG/M2 | RESPIRATION RATE: 18 BRPM | HEART RATE: 65 BPM | SYSTOLIC BLOOD PRESSURE: 127 MMHG | HEIGHT: 72 IN | WEIGHT: 180 LBS

## 2017-08-21 DIAGNOSIS — R07.89 CHEST TIGHTNESS: ICD-10-CM

## 2017-08-21 DIAGNOSIS — I25.10 CORONARY ARTERY DISEASE INVOLVING NATIVE CORONARY ARTERY OF NATIVE HEART WITHOUT ANGINA PECTORIS: ICD-10-CM

## 2017-08-21 DIAGNOSIS — E78.5 HYPERLIPIDEMIA, UNSPECIFIED HYPERLIPIDEMIA TYPE: ICD-10-CM

## 2017-08-21 LAB
BH CV STRESS BP STAGE 1: NORMAL
BH CV STRESS BP STAGE 2: NORMAL
BH CV STRESS BP STAGE 3: NORMAL
BH CV STRESS DURATION MIN STAGE 1: 3
BH CV STRESS DURATION MIN STAGE 2: 3
BH CV STRESS DURATION MIN STAGE 3: 3
BH CV STRESS DURATION SEC STAGE 1: 0
BH CV STRESS DURATION SEC STAGE 2: 0
BH CV STRESS DURATION SEC STAGE 3: 0
BH CV STRESS GRADE STAGE 1: 10
BH CV STRESS GRADE STAGE 2: 12
BH CV STRESS GRADE STAGE 3: 14
BH CV STRESS HR STAGE 1: 66
BH CV STRESS HR STAGE 2: 122
BH CV STRESS HR STAGE 3: 135
BH CV STRESS METS STAGE 1: 5
BH CV STRESS METS STAGE 2: 7.5
BH CV STRESS METS STAGE 3: 10
BH CV STRESS PROTOCOL 1: NORMAL
BH CV STRESS RECOVERY BP: NORMAL MMHG
BH CV STRESS RECOVERY HR: 73 BPM
BH CV STRESS SPEED STAGE 1: 1.7
BH CV STRESS SPEED STAGE 2: 2.5
BH CV STRESS SPEED STAGE 3: 3.4
BH CV STRESS STAGE 1: 1
BH CV STRESS STAGE 2: 2
BH CV STRESS STAGE 3: 3
MAXIMAL PREDICTED HEART RATE: 170 BPM
PERCENT MAX PREDICTED HR: 111.76 %
STRESS BASELINE BP: NORMAL MMHG
STRESS BASELINE HR: 65 BPM
STRESS O2 SAT REST: 100 %
STRESS PERCENT HR: 131 %
STRESS POST ESTIMATED WORKLOAD: 10.2 METS
STRESS POST EXERCISE DUR MIN: 9 MIN
STRESS POST EXERCISE DUR SEC: 0 SEC
STRESS POST PEAK BP: NORMAL MMHG
STRESS POST PEAK HR: 190 BPM
STRESS TARGET HR: 145 BPM

## 2017-08-21 PROCEDURE — 93016 CV STRESS TEST SUPVJ ONLY: CPT | Performed by: INTERNAL MEDICINE

## 2017-08-21 PROCEDURE — 93017 CV STRESS TEST TRACING ONLY: CPT

## 2017-08-21 PROCEDURE — 93018 CV STRESS TEST I&R ONLY: CPT | Performed by: INTERNAL MEDICINE

## 2017-08-24 ENCOUNTER — TELEPHONE (OUTPATIENT)
Dept: CARDIOLOGY | Facility: CLINIC | Age: 52
End: 2017-08-24

## 2017-08-24 NOTE — TELEPHONE ENCOUNTER
----- Message from LISA Taylor sent at 8/18/2017  9:57 AM EDT -----    Follow-up on treadmill stress test at [place] 8/21/17

## 2017-08-24 NOTE — TELEPHONE ENCOUNTER
The treadmill stress test was completed to determine his exercise tolerance and response to exercise for rehabilitation.  Patient has been informed and he is ready to enroll in cardiac rehabilitation.    He is scheduled to start cardiac rehab Monday.

## 2017-08-28 ENCOUNTER — TREATMENT (OUTPATIENT)
Dept: CARDIAC REHAB | Facility: HOSPITAL | Age: 52
End: 2017-08-28

## 2017-08-28 ENCOUNTER — HOSPITAL ENCOUNTER (OUTPATIENT)
Dept: CARDIOLOGY | Facility: HOSPITAL | Age: 52
Discharge: HOME | End: 2017-08-28
Attending: INTERNAL MEDICINE | Admitting: INTERNAL MEDICINE

## 2017-08-28 DIAGNOSIS — Z95.5 S/P CORONARY ARTERY STENT PLACEMENT: Primary | ICD-10-CM

## 2017-08-28 DIAGNOSIS — I25.10 CORONARY ARTERY DISEASE INVOLVING NATIVE CORONARY ARTERY OF NATIVE HEART WITHOUT ANGINA PECTORIS: ICD-10-CM

## 2017-08-28 DIAGNOSIS — I25.10 CORONARY ARTERY DISEASE INVOLVING NATIVE CORONARY ARTERY OF NATIVE HEART WITHOUT ANGINA PECTORIS: Primary | ICD-10-CM

## 2017-08-28 PROCEDURE — 93798 PHYS/QHP OP CAR RHAB W/ECG: CPT

## 2017-08-28 PROCEDURE — 93797 PHYS/QHP OP CAR RHAB WO ECG: CPT

## 2017-08-28 PROCEDURE — 93010 ELECTROCARDIOGRAM REPORT: CPT | Performed by: INTERNAL MEDICINE

## 2017-08-28 PROCEDURE — 93005 ELECTROCARDIOGRAM TRACING: CPT | Performed by: INTERNAL MEDICINE

## 2017-08-30 ENCOUNTER — TREATMENT (OUTPATIENT)
Dept: CARDIAC REHAB | Facility: HOSPITAL | Age: 52
End: 2017-08-30

## 2017-08-30 DIAGNOSIS — Z95.5 S/P CORONARY ARTERY STENT PLACEMENT: Primary | ICD-10-CM

## 2017-08-30 PROCEDURE — 93798 PHYS/QHP OP CAR RHAB W/ECG: CPT

## 2017-09-01 ENCOUNTER — TREATMENT (OUTPATIENT)
Dept: CARDIAC REHAB | Facility: HOSPITAL | Age: 52
End: 2017-09-01

## 2017-09-01 DIAGNOSIS — Z95.5 S/P CORONARY ARTERY STENT PLACEMENT: Primary | ICD-10-CM

## 2017-09-01 PROCEDURE — 93798 PHYS/QHP OP CAR RHAB W/ECG: CPT

## 2017-09-06 ENCOUNTER — TREATMENT (OUTPATIENT)
Dept: CARDIAC REHAB | Facility: HOSPITAL | Age: 52
End: 2017-09-06

## 2017-09-06 DIAGNOSIS — Z95.5 S/P CORONARY ARTERY STENT PLACEMENT: Primary | ICD-10-CM

## 2017-09-06 PROCEDURE — 93798 PHYS/QHP OP CAR RHAB W/ECG: CPT

## 2017-09-08 ENCOUNTER — TREATMENT (OUTPATIENT)
Dept: CARDIAC REHAB | Facility: HOSPITAL | Age: 52
End: 2017-09-08

## 2017-09-08 ENCOUNTER — OFFICE VISIT (OUTPATIENT)
Dept: CARDIOLOGY | Facility: CLINIC | Age: 52
End: 2017-09-08

## 2017-09-08 VITALS
DIASTOLIC BLOOD PRESSURE: 80 MMHG | HEIGHT: 72 IN | HEART RATE: 54 BPM | SYSTOLIC BLOOD PRESSURE: 120 MMHG | BODY MASS INDEX: 23.98 KG/M2 | WEIGHT: 177 LBS

## 2017-09-08 DIAGNOSIS — Z95.5 S/P CORONARY ARTERY STENT PLACEMENT: ICD-10-CM

## 2017-09-08 DIAGNOSIS — I25.10 CORONARY ARTERY DISEASE INVOLVING NATIVE CORONARY ARTERY OF NATIVE HEART WITHOUT ANGINA PECTORIS: Primary | ICD-10-CM

## 2017-09-08 DIAGNOSIS — E78.5 HYPERLIPIDEMIA, UNSPECIFIED HYPERLIPIDEMIA TYPE: ICD-10-CM

## 2017-09-08 DIAGNOSIS — I44.7 LBBB (LEFT BUNDLE BRANCH BLOCK): ICD-10-CM

## 2017-09-08 DIAGNOSIS — Z95.5 S/P CORONARY ARTERY STENT PLACEMENT: Primary | ICD-10-CM

## 2017-09-08 PROCEDURE — 93000 ELECTROCARDIOGRAM COMPLETE: CPT | Performed by: INTERNAL MEDICINE

## 2017-09-08 PROCEDURE — 99214 OFFICE O/P EST MOD 30 MIN: CPT | Performed by: INTERNAL MEDICINE

## 2017-09-08 PROCEDURE — 93798 PHYS/QHP OP CAR RHAB W/ECG: CPT

## 2017-09-08 NOTE — PROGRESS NOTES
Date of Office Visit: 2017  Encounter Provider: Paula Hall MD  Place of Service: [facility] CARDIOLOGY  Patient Name: Abram Allen  :1965      Patient ID:  Abram Allen is a 51 y.o. male is here for  followup for CAD.         History of Present Illness    Abram Allen came in for an evaluation 2017.  He was having chest heaviness with running.  He has been a lifelong runner. He had a treadmill study done because of the chest pain.  This was done 8/3/2017 showed left bundle branch block with repolarization changes during exercise.  Because of his complaints and the ECG changes, I recommended that he undergo heart catheterization.  This was done 8/10/2017.  This showed normal left main, 10% proximal LAD, 95% mid LAD, normal left circumflex, 10% first obtuse marginal stenosis, 10% proximal RCA stenosis and 30% mid vessel stenosis.  He received a Xience Alpine drug-eluting stent, 3.5 x 18 mm the mid left anterior descending artery.  He sought Jaim[facility] Tomlinson 2017.  He was back at work and did have some mild chest tightness but it didn't feel like his prior chest tightness.  She recommended a treadmill stress study which was done 2017.  This showed normal heart rate response and blood pressure response exercise and good exercise tolerance.  He was referred on to cardiac rehabilitation.    He feels well at this time.  He's had no tachycardia.  His energy level is better.  He isn't difficulty breathing.  He's had no dizziness or syncope.  He's telling his medications.  He really has no chest pain or pressure.  He has not yet back to running.  He has back to work..        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    Past Medical History:   Diagnosis Date   • Coronary artery disease    • Hyperlipidemia    • Left bundle branch block          Past Surgical History:   Procedure Laterality Date   • ADENOIDECTOMY     • CARDIAC  CATHETERIZATION N/A 8/10/2017    by Eneida Black MD; Left main normal, LAD scattered 10% proximal stenosis, mid LAD 95% concentric stenosis near focal area of calcification, left circumflex normal, first obtuse marginal branch proximal 10% stenosis, and right coronary artery with a 10% proximal and a 30% mid stenosis   • CARDIAC CATHETERIZATION N/A 8/10/2017    by Eneida Black MD; Xience Alpine drug-eluting stent placement 3.5×18 mm to the mid LAD     • SHOULDER SURGERY Left        Current Outpatient Prescriptions on File Prior to Visit   Medication Sig Dispense Refill   • aspirin 81 MG tablet Take 1 tablet by mouth Daily. 90 tablet 3   • atorvastatin (LIPITOR) 20 MG tablet Take 20 mg by mouth Daily.     • ticagrelor (BRILINTA) 90 MG tablet tablet Take 1 tablet by mouth 2 (Two) Times a Day. 60 tablet 11     No current facility-administered medications on file prior to visit.        Social History     Social History   • Marital status:      Spouse name: N/A   • Number of children: N/A   • Years of education: N/A     Occupational History   • Not on file.     Social History Main Topics   • Smoking status: Never Smoker   • Smokeless tobacco: Not on file      Comment: caffeine use   • Alcohol use No   • Drug use: No   • Sexual activity: Defer     Other Topics Concern   • Not on file     Social History Narrative           Review of Systems   Constitution: Negative.   HENT: Negative for congestion and headaches.    Eyes: Negative for vision loss in left eye and vision loss in right eye.   Respiratory: Negative.  Negative for cough, hemoptysis, shortness of breath, sleep disturbances due to breathing, snoring, sputum production and wheezing.    Endocrine: Negative.    Hematologic/Lymphatic: Negative.    Skin: Negative for poor wound healing and rash.   Musculoskeletal: Negative for falls, gout, muscle cramps and myalgias.   Gastrointestinal: Negative for abdominal pain, diarrhea, dysphagia, hematemesis, melena,  "nausea and vomiting.   Neurological: Negative for excessive daytime sleepiness, dizziness, light-headedness, loss of balance, seizures and vertigo.   Psychiatric/Behavioral: Negative for depression and substance abuse. The patient is not nervous/anxious.        Procedures    ECG 12 Lead  Date/Time: 9/8/2017 10:50 AM  Performed by: SNIDY BERGMAN  Authorized by: SINDY BERGMAN   Comparison: compared with previous ECG   Similar to previous ECG  Rhythm: sinus rhythm  Conduction: left bundle branch block  Clinical impression: abnormal ECG               Objective:      Vitals:    09/08/17 1038   BP: 120/80   BP Location: Right arm   Patient Position: Sitting   Pulse: 54   Weight: 177 lb (80.3 kg)   Height: 72\" (182.9 cm)     Body mass index is 24.01 kg/(m^2).    Physical Exam   Constitutional: He is oriented to person, place, and time. He appears well-developed and well-nourished. No distress.   HENT:   Head: Normocephalic and atraumatic.   Eyes: Conjunctivae are normal. No scleral icterus.   Neck: Neck supple. No JVD present. Carotid bruit is not present. No thyromegaly present.   Cardiovascular: Normal rate, regular rhythm, S1 normal, S2 normal, normal heart sounds and intact distal pulses.   No extrasystoles are present. PMI is not displaced.  Exam reveals no gallop.    No murmur heard.  Pulses:       Carotid pulses are 2+ on the right side, and 2+ on the left side.       Radial pulses are 2+ on the right side, and 2+ on the left side.        Dorsalis pedis pulses are 2+ on the right side, and 2+ on the left side.        Posterior tibial pulses are 2+ on the right side, and 2+ on the left side.   Pulmonary/Chest: Effort normal and breath sounds normal. No respiratory distress. He has no wheezes. He has no rhonchi. He has no rales. He exhibits no tenderness.   Abdominal: Soft. Bowel sounds are normal. He exhibits no distension, no abdominal bruit and no mass. There is no tenderness.   Musculoskeletal: He " exhibits no edema or deformity.   Lymphadenopathy:     He has no cervical adenopathy.   Neurological: He is alert and oriented to person, place, and time. No cranial nerve deficit.   Skin: Skin is warm and dry. No rash noted. He is not diaphoretic. No cyanosis. No pallor. Nails show no clubbing.   Psychiatric: He has a normal mood and affect. Judgment normal.   Vitals reviewed.      Lab Review:       Assessment:      Diagnosis Plan   1. Coronary artery disease involving native coronary artery of native heart without angina pectoris     2. Hyperlipidemia, unspecified hyperlipidemia type     3. LBBB (left bundle branch block)     4. S/P coronary artery stent placement          1. CAD, s/p LAD stent 8/10/17, with presentation of unstable angina.  No further angina  2. Hyperlipidemia, untreated.   3. LBBB on ECG.   4. High stress job, has made changes.      Plan:       See back in 3 months, no med changes.     Coronary Artery Disease  Assessment  • The patient has no angina    Subjective - Objective  • There has been a previous stent procedure using PADMINI  • Current antiplatelet therapy includes aspirin 81 mg and ticagrelor 90 mg

## 2017-09-11 ENCOUNTER — OFFICE VISIT (OUTPATIENT)
Dept: CARDIAC REHAB | Facility: HOSPITAL | Age: 52
End: 2017-09-11

## 2017-09-11 ENCOUNTER — TREATMENT (OUTPATIENT)
Dept: CARDIAC REHAB | Facility: HOSPITAL | Age: 52
End: 2017-09-11

## 2017-09-11 DIAGNOSIS — Z95.5 S/P CORONARY ARTERY STENT PLACEMENT: Primary | ICD-10-CM

## 2017-09-11 PROCEDURE — 93797 PHYS/QHP OP CAR RHAB WO ECG: CPT

## 2017-09-11 PROCEDURE — 93798 PHYS/QHP OP CAR RHAB W/ECG: CPT

## 2017-09-13 ENCOUNTER — TREATMENT (OUTPATIENT)
Dept: CARDIAC REHAB | Facility: HOSPITAL | Age: 52
End: 2017-09-13

## 2017-09-13 DIAGNOSIS — Z95.5 S/P CORONARY ARTERY STENT PLACEMENT: Primary | ICD-10-CM

## 2017-09-13 PROCEDURE — 93798 PHYS/QHP OP CAR RHAB W/ECG: CPT

## 2017-09-15 ENCOUNTER — TREATMENT (OUTPATIENT)
Dept: CARDIAC REHAB | Facility: HOSPITAL | Age: 52
End: 2017-09-15

## 2017-09-15 DIAGNOSIS — Z95.5 S/P CORONARY ARTERY STENT PLACEMENT: Primary | ICD-10-CM

## 2017-09-15 PROCEDURE — 93798 PHYS/QHP OP CAR RHAB W/ECG: CPT

## 2017-09-18 ENCOUNTER — TREATMENT (OUTPATIENT)
Dept: CARDIAC REHAB | Facility: HOSPITAL | Age: 52
End: 2017-09-18

## 2017-09-18 DIAGNOSIS — Z95.5 S/P CORONARY ARTERY STENT PLACEMENT: Primary | ICD-10-CM

## 2017-09-18 PROCEDURE — 93798 PHYS/QHP OP CAR RHAB W/ECG: CPT

## 2017-09-20 ENCOUNTER — TREATMENT (OUTPATIENT)
Dept: CARDIAC REHAB | Facility: HOSPITAL | Age: 52
End: 2017-09-20

## 2017-09-20 DIAGNOSIS — Z95.5 S/P CORONARY ARTERY STENT PLACEMENT: Primary | ICD-10-CM

## 2017-09-20 PROCEDURE — 93798 PHYS/QHP OP CAR RHAB W/ECG: CPT

## 2017-09-22 ENCOUNTER — TREATMENT (OUTPATIENT)
Dept: CARDIAC REHAB | Facility: HOSPITAL | Age: 52
End: 2017-09-22

## 2017-09-22 DIAGNOSIS — Z95.5 S/P CORONARY ARTERY STENT PLACEMENT: Primary | ICD-10-CM

## 2017-09-22 PROCEDURE — 93798 PHYS/QHP OP CAR RHAB W/ECG: CPT

## 2017-09-25 ENCOUNTER — TREATMENT (OUTPATIENT)
Dept: CARDIAC REHAB | Facility: HOSPITAL | Age: 52
End: 2017-09-25

## 2017-09-25 DIAGNOSIS — Z95.5 S/P CORONARY ARTERY STENT PLACEMENT: Primary | ICD-10-CM

## 2017-09-25 PROCEDURE — 93798 PHYS/QHP OP CAR RHAB W/ECG: CPT

## 2017-09-27 ENCOUNTER — TREATMENT (OUTPATIENT)
Dept: CARDIAC REHAB | Facility: HOSPITAL | Age: 52
End: 2017-09-27

## 2017-09-27 DIAGNOSIS — Z95.5 S/P CORONARY ARTERY STENT PLACEMENT: Primary | ICD-10-CM

## 2017-09-27 PROCEDURE — 93798 PHYS/QHP OP CAR RHAB W/ECG: CPT

## 2017-10-02 ENCOUNTER — TREATMENT (OUTPATIENT)
Dept: CARDIAC REHAB | Facility: HOSPITAL | Age: 52
End: 2017-10-02

## 2017-10-02 DIAGNOSIS — Z95.5 S/P CORONARY ARTERY STENT PLACEMENT: Primary | ICD-10-CM

## 2017-10-02 PROCEDURE — 93798 PHYS/QHP OP CAR RHAB W/ECG: CPT

## 2017-10-06 ENCOUNTER — TREATMENT (OUTPATIENT)
Dept: CARDIAC REHAB | Facility: HOSPITAL | Age: 52
End: 2017-10-06

## 2017-10-06 DIAGNOSIS — Z95.5 S/P CORONARY ARTERY STENT PLACEMENT: Primary | ICD-10-CM

## 2017-10-06 PROCEDURE — 93798 PHYS/QHP OP CAR RHAB W/ECG: CPT

## 2017-10-09 ENCOUNTER — TREATMENT (OUTPATIENT)
Dept: CARDIAC REHAB | Facility: HOSPITAL | Age: 52
End: 2017-10-09

## 2017-10-09 ENCOUNTER — APPOINTMENT (OUTPATIENT)
Dept: CARDIAC REHAB | Facility: HOSPITAL | Age: 52
End: 2017-10-09

## 2017-10-09 ENCOUNTER — OFFICE VISIT (OUTPATIENT)
Dept: CARDIAC REHAB | Facility: HOSPITAL | Age: 52
End: 2017-10-09

## 2017-10-09 DIAGNOSIS — Z95.5 S/P CORONARY ARTERY STENT PLACEMENT: Primary | ICD-10-CM

## 2017-10-09 PROCEDURE — 93797 PHYS/QHP OP CAR RHAB WO ECG: CPT

## 2017-10-09 PROCEDURE — 93798 PHYS/QHP OP CAR RHAB W/ECG: CPT

## 2017-10-11 ENCOUNTER — TREATMENT (OUTPATIENT)
Dept: CARDIAC REHAB | Facility: HOSPITAL | Age: 52
End: 2017-10-11

## 2017-10-11 DIAGNOSIS — Z95.5 S/P CORONARY ARTERY STENT PLACEMENT: Primary | ICD-10-CM

## 2017-10-11 PROCEDURE — 93798 PHYS/QHP OP CAR RHAB W/ECG: CPT

## 2017-10-13 ENCOUNTER — TREATMENT (OUTPATIENT)
Dept: CARDIAC REHAB | Facility: HOSPITAL | Age: 52
End: 2017-10-13

## 2017-10-13 DIAGNOSIS — Z95.5 S/P CORONARY ARTERY STENT PLACEMENT: Primary | ICD-10-CM

## 2017-10-13 PROCEDURE — 93798 PHYS/QHP OP CAR RHAB W/ECG: CPT

## 2017-10-16 ENCOUNTER — TREATMENT (OUTPATIENT)
Dept: CARDIAC REHAB | Facility: HOSPITAL | Age: 52
End: 2017-10-16

## 2017-10-16 DIAGNOSIS — Z95.5 S/P CORONARY ARTERY STENT PLACEMENT: Primary | ICD-10-CM

## 2017-10-16 PROCEDURE — 93798 PHYS/QHP OP CAR RHAB W/ECG: CPT

## 2017-10-18 ENCOUNTER — TREATMENT (OUTPATIENT)
Dept: CARDIAC REHAB | Facility: HOSPITAL | Age: 52
End: 2017-10-18

## 2017-10-18 DIAGNOSIS — Z95.5 S/P CORONARY ARTERY STENT PLACEMENT: Primary | ICD-10-CM

## 2017-10-18 PROCEDURE — 93798 PHYS/QHP OP CAR RHAB W/ECG: CPT

## 2017-10-20 ENCOUNTER — APPOINTMENT (OUTPATIENT)
Dept: CARDIAC REHAB | Facility: HOSPITAL | Age: 52
End: 2017-10-20

## 2017-10-23 ENCOUNTER — APPOINTMENT (OUTPATIENT)
Dept: CARDIAC REHAB | Facility: HOSPITAL | Age: 52
End: 2017-10-23

## 2017-10-25 ENCOUNTER — APPOINTMENT (OUTPATIENT)
Dept: CARDIAC REHAB | Facility: HOSPITAL | Age: 52
End: 2017-10-25

## 2017-10-27 ENCOUNTER — APPOINTMENT (OUTPATIENT)
Dept: CARDIAC REHAB | Facility: HOSPITAL | Age: 52
End: 2017-10-27

## 2017-10-30 ENCOUNTER — APPOINTMENT (OUTPATIENT)
Dept: CARDIAC REHAB | Facility: HOSPITAL | Age: 52
End: 2017-10-30

## 2017-11-01 ENCOUNTER — APPOINTMENT (OUTPATIENT)
Dept: CARDIAC REHAB | Facility: HOSPITAL | Age: 52
End: 2017-11-01

## 2017-11-03 ENCOUNTER — APPOINTMENT (OUTPATIENT)
Dept: CARDIAC REHAB | Facility: HOSPITAL | Age: 52
End: 2017-11-03

## 2017-11-06 ENCOUNTER — APPOINTMENT (OUTPATIENT)
Dept: CARDIAC REHAB | Facility: HOSPITAL | Age: 52
End: 2017-11-06

## 2017-11-08 ENCOUNTER — APPOINTMENT (OUTPATIENT)
Dept: CARDIAC REHAB | Facility: HOSPITAL | Age: 52
End: 2017-11-08

## 2017-11-10 ENCOUNTER — APPOINTMENT (OUTPATIENT)
Dept: CARDIAC REHAB | Facility: HOSPITAL | Age: 52
End: 2017-11-10

## 2017-11-13 ENCOUNTER — APPOINTMENT (OUTPATIENT)
Dept: CARDIAC REHAB | Facility: HOSPITAL | Age: 52
End: 2017-11-13

## 2017-11-15 ENCOUNTER — APPOINTMENT (OUTPATIENT)
Dept: CARDIAC REHAB | Facility: HOSPITAL | Age: 52
End: 2017-11-15

## 2017-11-17 ENCOUNTER — APPOINTMENT (OUTPATIENT)
Dept: CARDIAC REHAB | Facility: HOSPITAL | Age: 52
End: 2017-11-17

## 2017-11-20 ENCOUNTER — APPOINTMENT (OUTPATIENT)
Dept: CARDIAC REHAB | Facility: HOSPITAL | Age: 52
End: 2017-11-20

## 2017-12-08 ENCOUNTER — OFFICE VISIT (OUTPATIENT)
Dept: CARDIOLOGY | Facility: CLINIC | Age: 52
End: 2017-12-08

## 2017-12-08 VITALS
SYSTOLIC BLOOD PRESSURE: 140 MMHG | HEART RATE: 55 BPM | DIASTOLIC BLOOD PRESSURE: 82 MMHG | WEIGHT: 180.4 LBS | HEIGHT: 72 IN | BODY MASS INDEX: 24.43 KG/M2

## 2017-12-08 DIAGNOSIS — I25.10 CORONARY ARTERY DISEASE INVOLVING NATIVE CORONARY ARTERY OF NATIVE HEART WITHOUT ANGINA PECTORIS: Primary | ICD-10-CM

## 2017-12-08 DIAGNOSIS — E78.5 HYPERLIPIDEMIA, UNSPECIFIED HYPERLIPIDEMIA TYPE: ICD-10-CM

## 2017-12-08 DIAGNOSIS — Z95.5 S/P CORONARY ARTERY STENT PLACEMENT: ICD-10-CM

## 2017-12-08 DIAGNOSIS — I44.7 LBBB (LEFT BUNDLE BRANCH BLOCK): ICD-10-CM

## 2017-12-08 PROCEDURE — 99214 OFFICE O/P EST MOD 30 MIN: CPT | Performed by: INTERNAL MEDICINE

## 2017-12-08 PROCEDURE — 93000 ELECTROCARDIOGRAM COMPLETE: CPT | Performed by: INTERNAL MEDICINE

## 2017-12-08 NOTE — PROGRESS NOTES
Date of Office Visit: 2017  Encounter Provider: Paula Hall MD  Place of Service: [facility] CARDIOLOGY  Patient Name: Abram Allen  :1965      Patient ID:  Abram Allen is a 52 y.o. male is here for  followup for CAD, s/p stent        History of Present Illness  Abram Allen came in for an evaluation 2017.  He was having chest heaviness with running.  He has been a lifelong runner. He had a treadmill study done because of the chest pain.  This was done 8/3/2017 showed left bundle branch block with repolarization changes during exercise.  Because of his complaints and the ECG changes, I recommended that he undergo heart catheterization.  This was done 8/10/2017.  This showed normal left main, 10% proximal LAD, 95% mid LAD, normal left circumflex, 10% first obtuse marginal stenosis, 10% proximal RCA stenosis and 30% mid vessel stenosis.  He received a Xience Alpine drug-eluting stent, 3.5 x 18 mm the mid left anterior descending artery.  He had a treadmill stress study which was done 2017.  This showed normal heart rate response and blood pressure response exercise and good exercise tolerance.  He completed cardiac rehabilitation.     He feels well at this time.  He's had no tachycardia.  His energy level is better.  He isn't difficulty breathing.  He's had no dizziness or syncope.  He's telling his medications.  He really has no chest pain or pressure.  He has not yet back to running.  He has back to work..          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    At this time, he is doing better.  He is running 3 miles on Saturday and .  He's walking 45 minutes all other days.  He has no exertional chest tightness or pressure.  He's had no tachycardia, dizziness or syncope.  His energy level is good.  He saw his medications well.      Past Medical History:   Diagnosis Date   • Coronary artery disease    •  Hyperlipidemia    • Left bundle branch block          Past Surgical History:   Procedure Laterality Date   • ADENOIDECTOMY     • CARDIAC CATHETERIZATION N/A 8/10/2017    by Eneida Black MD; Left main normal, LAD scattered 10% proximal stenosis, mid LAD 95% concentric stenosis near focal area of calcification, left circumflex normal, first obtuse marginal branch proximal 10% stenosis, and right coronary artery with a 10% proximal and a 30% mid stenosis   • CARDIAC CATHETERIZATION N/A 8/10/2017    by Eneida Black MD; Xience Alpine drug-eluting stent placement 3.5×18 mm to the mid LAD     • SHOULDER SURGERY Left        Current Outpatient Prescriptions on File Prior to Visit   Medication Sig Dispense Refill   • aspirin 81 MG tablet Take 1 tablet by mouth Daily. 90 tablet 3   • atorvastatin (LIPITOR) 20 MG tablet Take 20 mg by mouth Daily.     • ticagrelor (BRILINTA) 90 MG tablet tablet Take 1 tablet by mouth 2 (Two) Times a Day. 60 tablet 11     No current facility-administered medications on file prior to visit.        Social History     Social History   • Marital status:      Spouse name: N/A   • Number of children: N/A   • Years of education: N/A     Occupational History   • Not on file.     Social History Main Topics   • Smoking status: Never Smoker   • Smokeless tobacco: Never Used      Comment: caffeine use   • Alcohol use No   • Drug use: No   • Sexual activity: Defer     Other Topics Concern   • Not on file     Social History Narrative           Review of Systems   Constitution: Negative.   HENT: Negative for congestion.    Eyes: Negative for vision loss in left eye and vision loss in right eye.   Respiratory: Negative.  Negative for cough, hemoptysis, shortness of breath, sleep disturbances due to breathing, snoring, sputum production and wheezing.    Endocrine: Negative.    Hematologic/Lymphatic: Negative.    Skin: Negative for poor wound healing and rash.   Musculoskeletal: Negative for falls,  "gout, muscle cramps and myalgias.   Gastrointestinal: Negative for abdominal pain, diarrhea, dysphagia, hematemesis, melena, nausea and vomiting.   Neurological: Negative for excessive daytime sleepiness, dizziness, headaches, light-headedness, loss of balance, seizures and vertigo.   Psychiatric/Behavioral: Negative for depression and substance abuse. The patient is not nervous/anxious.        Procedures    ECG 12 Lead  Date/Time: 12/8/2017 10:49 AM  Performed by: SINDY BERGMAN  Authorized by: SINDY BERGMAN   Comparison: compared with previous ECG   Similar to previous ECG  Rhythm: sinus rhythm  Conduction: left bundle branch block  Clinical impression: abnormal ECG               Objective:      Vitals:    12/08/17 1042   BP: 140/82   BP Location: Right arm   Patient Position: Sitting   Pulse: 55   Weight: 81.8 kg (180 lb 6.4 oz)   Height: 182.9 cm (72.01\")     Body mass index is 24.46 kg/(m^2).    Physical Exam   Constitutional: He is oriented to person, place, and time. He appears well-developed and well-nourished. No distress.   HENT:   Head: Normocephalic and atraumatic.   Eyes: Conjunctivae are normal. No scleral icterus.   Neck: Neck supple. No JVD present. Carotid bruit is not present. No thyromegaly present.   Cardiovascular: Normal rate, regular rhythm, S1 normal, S2 normal, normal heart sounds and intact distal pulses.   No extrasystoles are present. PMI is not displaced.  Exam reveals no gallop.    No murmur heard.  Pulses:       Carotid pulses are 2+ on the right side, and 2+ on the left side.       Radial pulses are 2+ on the right side, and 2+ on the left side.        Dorsalis pedis pulses are 2+ on the right side, and 2+ on the left side.        Posterior tibial pulses are 2+ on the right side, and 2+ on the left side.   Pulmonary/Chest: Effort normal and breath sounds normal. No respiratory distress. He has no wheezes. He has no rhonchi. He has no rales. He exhibits no tenderness. "   Abdominal: Soft. Bowel sounds are normal. He exhibits no distension, no abdominal bruit and no mass. There is no tenderness.   Musculoskeletal: He exhibits no edema or deformity.   Lymphadenopathy:     He has no cervical adenopathy.   Neurological: He is alert and oriented to person, place, and time. No cranial nerve deficit.   Skin: Skin is warm and dry. No rash noted. He is not diaphoretic. No cyanosis. No pallor. Nails show no clubbing.   Psychiatric: He has a normal mood and affect. Judgment normal.   Vitals reviewed.      Lab Review:       Assessment:      Diagnosis Plan   1. Coronary artery disease involving native coronary artery of native heart without angina pectoris     2. LBBB (left bundle branch block)     3. Hyperlipidemia, unspecified hyperlipidemia type     4. S/P coronary artery stent placement       1. CAD, s/p LAD stent 8/10/17, with presentation of unstable angina.  No further angina  2. Hyperlipidemia, untreated.   3. LBBB on ECG.   4. High stress job, has made changes.      Plan:       Change to Brilinta 60 mg twice daily starting August 2018.  Repeat laboratory values including CMP and lipids February 2018.    See back in one year.    Coronary Artery Disease  Assessment  • The patient has no angina    Subjective - Objective  • There has been a previous stent procedure using PADMINI  • Current antiplatelet therapy includes aspirin 81 mg and ticagrelor 90 mg

## 2018-02-07 ENCOUNTER — TELEPHONE (OUTPATIENT)
Dept: CARDIOLOGY | Facility: CLINIC | Age: 53
End: 2018-02-07

## 2018-02-07 DIAGNOSIS — I25.10 CORONARY ARTERY DISEASE INVOLVING NATIVE CORONARY ARTERY OF NATIVE HEART WITHOUT ANGINA PECTORIS: Primary | ICD-10-CM

## 2018-02-07 NOTE — TELEPHONE ENCOUNTER
Pt stopped by the office wondering if you can put in orders for his lab work that he is to have done.

## 2018-02-09 ENCOUNTER — LAB (OUTPATIENT)
Dept: LAB | Facility: HOSPITAL | Age: 53
End: 2018-02-09
Attending: INTERNAL MEDICINE

## 2018-02-09 ENCOUNTER — TELEPHONE (OUTPATIENT)
Dept: CARDIOLOGY | Facility: CLINIC | Age: 53
End: 2018-02-09

## 2018-02-09 DIAGNOSIS — I25.10 CORONARY ARTERY DISEASE INVOLVING NATIVE CORONARY ARTERY OF NATIVE HEART WITHOUT ANGINA PECTORIS: ICD-10-CM

## 2018-02-09 LAB
ALBUMIN SERPL-MCNC: 4.4 G/DL (ref 3.5–5.2)
ALBUMIN/GLOB SERPL: 1.8 G/DL
ALP SERPL-CCNC: 58 U/L (ref 40–129)
ALT SERPL W P-5'-P-CCNC: 26 U/L (ref 5–41)
ANION GAP SERPL CALCULATED.3IONS-SCNC: 11 MMOL/L
AST SERPL-CCNC: 18 U/L (ref 5–40)
BILIRUB SERPL-MCNC: 0.7 MG/DL (ref 0.2–1.2)
BUN BLD-MCNC: 17 MG/DL (ref 6–20)
BUN/CREAT SERPL: 18.7 (ref 7–25)
CALCIUM SPEC-SCNC: 9 MG/DL (ref 8.6–10.5)
CHLORIDE SERPL-SCNC: 102 MMOL/L (ref 98–107)
CHOLEST SERPL-MCNC: 139 MG/DL (ref 0–200)
CO2 SERPL-SCNC: 27 MMOL/L (ref 22–29)
CREAT BLD-MCNC: 0.91 MG/DL (ref 0.76–1.27)
DEPRECATED RDW RBC AUTO: 38.5 FL (ref 37–54)
ERYTHROCYTE [DISTWIDTH] IN BLOOD BY AUTOMATED COUNT: 12 % (ref 11.5–14.5)
GFR SERPL CREATININE-BSD FRML MDRD: 88 ML/MIN/1.73
GLOBULIN UR ELPH-MCNC: 2.5 GM/DL
GLUCOSE BLD-MCNC: 94 MG/DL (ref 65–99)
HCT VFR BLD AUTO: 42.1 % (ref 42–52)
HDLC SERPL-MCNC: 50 MG/DL (ref 40–60)
HGB BLD-MCNC: 14.6 G/DL (ref 14–18)
LDLC SERPL CALC-MCNC: 77 MG/DL (ref 0–100)
LDLC/HDLC SERPL: 1.53 {RATIO}
MCH RBC QN AUTO: 30.3 PG (ref 27–31)
MCHC RBC AUTO-ENTMCNC: 34.7 G/DL (ref 31–37)
MCV RBC AUTO: 87.3 FL (ref 80–94)
PLATELET # BLD AUTO: 221 10*3/MM3 (ref 140–500)
PMV BLD AUTO: 10.7 FL (ref 7.4–10.4)
POTASSIUM BLD-SCNC: 4.1 MMOL/L (ref 3.5–5.2)
PROT SERPL-MCNC: 6.9 G/DL (ref 6–8.5)
RBC # BLD AUTO: 4.82 10*6/MM3 (ref 4.7–6.1)
SODIUM BLD-SCNC: 140 MMOL/L (ref 136–145)
TRIGL SERPL-MCNC: 62 MG/DL (ref 0–150)
VLDLC SERPL-MCNC: 12.4 MG/DL (ref 8–32)
WBC NRBC COR # BLD: 6.96 10*3/MM3 (ref 4.8–10.8)

## 2018-02-09 PROCEDURE — 80061 LIPID PANEL: CPT

## 2018-02-09 PROCEDURE — 36415 COLL VENOUS BLD VENIPUNCTURE: CPT

## 2018-02-09 PROCEDURE — 85027 COMPLETE CBC AUTOMATED: CPT

## 2018-02-09 PROCEDURE — 80053 COMPREHEN METABOLIC PANEL: CPT

## 2018-02-09 NOTE — TELEPHONE ENCOUNTER
----- Message from Paula Hall MD sent at 2/9/2018  9:20 AM EST -----  pls call and let him know that labs are good - no changes.

## 2018-07-03 ENCOUNTER — OFFICE VISIT (OUTPATIENT)
Dept: ORTHOPEDIC SURGERY | Facility: CLINIC | Age: 53
End: 2018-07-03

## 2018-07-03 VITALS
WEIGHT: 175 LBS | SYSTOLIC BLOOD PRESSURE: 143 MMHG | HEIGHT: 72 IN | DIASTOLIC BLOOD PRESSURE: 77 MMHG | BODY MASS INDEX: 23.7 KG/M2 | HEART RATE: 60 BPM

## 2018-07-03 DIAGNOSIS — S39.012A STRAIN OF LUMBAR REGION, INITIAL ENCOUNTER: ICD-10-CM

## 2018-07-03 DIAGNOSIS — R52 PAIN: Primary | ICD-10-CM

## 2018-07-03 PROCEDURE — 72100 X-RAY EXAM L-S SPINE 2/3 VWS: CPT | Performed by: ORTHOPAEDIC SURGERY

## 2018-07-03 PROCEDURE — 99203 OFFICE O/P NEW LOW 30 MIN: CPT | Performed by: ORTHOPAEDIC SURGERY

## 2018-07-03 RX ORDER — CYCLOBENZAPRINE HCL 5 MG
5 TABLET ORAL 3 TIMES DAILY PRN
Qty: 30 TABLET | Refills: 0 | Status: SHIPPED | OUTPATIENT
Start: 2018-07-03 | End: 2018-12-10

## 2018-07-03 RX ORDER — METHYLPREDNISOLONE 4 MG/1
TABLET ORAL
Qty: 1 TABLET | Refills: 0 | Status: SHIPPED | OUTPATIENT
Start: 2018-07-03 | End: 2018-12-10

## 2018-07-03 NOTE — PROGRESS NOTES
Subjective:     Patient ID: Abram Allen is a 52 y.o. male.    Chief Complaint:  Low back pain  History of Present Illness  Abram Allen presents to clinic today for evaluation of low back pain that has been present for the last 5 days, denies specific injury prior to the onset of his pain, states he was starting to get out of his truck when he did note some sharp pain in the paraspinal region of his lower lumbar spine, minimal radiation down into his posterior buttock region, denies associated numbness or tingling.  Rates current level of pain as a 5-6 out of 10 in aching in nature, exacerbated with lateral bend and 4 stretch, minimal improvement with rest, activity modification, over-the-counter anti-inflammatory medications.  Denies any bowel or bladder issues.     Social History     Occupational History   • Not on file.     Social History Main Topics   • Smoking status: Never Smoker   • Smokeless tobacco: Never Used      Comment: caffeine use   • Alcohol use No   • Drug use: No   • Sexual activity: Defer      Past Medical History:   Diagnosis Date   • Coronary artery disease    • Hyperlipidemia    • Left bundle branch block      Past Surgical History:   Procedure Laterality Date   • ADENOIDECTOMY     • CARDIAC CATHETERIZATION N/A 8/10/2017    by Eneida Black MD; Left main normal, LAD scattered 10% proximal stenosis, mid LAD 95% concentric stenosis near focal area of calcification, left circumflex normal, first obtuse marginal branch proximal 10% stenosis, and right coronary artery with a 10% proximal and a 30% mid stenosis   • CARDIAC CATHETERIZATION N/A 8/10/2017    by Eneida Black MD; Xience Alpine drug-eluting stent placement 3.5×18 mm to the mid LAD     • SHOULDER SURGERY Left        Family History   Problem Relation Age of Onset   • Cancer Father    • Heart attack Maternal Grandmother    • Cancer Paternal Grandmother          Review of Systems   Constitutional: Negative for chills, diaphoresis,  "fever and unexpected weight change.   HENT: Negative for hearing loss, nosebleeds, sore throat and tinnitus.    Eyes: Negative for pain and visual disturbance.   Respiratory: Negative for cough, shortness of breath and wheezing.    Cardiovascular: Negative for chest pain and palpitations.   Gastrointestinal: Negative for abdominal pain, diarrhea, nausea and vomiting.   Endocrine: Negative for cold intolerance, heat intolerance and polydipsia.   Genitourinary: Negative for difficulty urinating, dysuria and hematuria.   Musculoskeletal: Positive for arthralgias and back pain. Negative for joint swelling and myalgias.   Skin: Negative for rash and wound.   Allergic/Immunologic: Negative for environmental allergies.   Neurological: Negative for dizziness, syncope, weakness, numbness and headaches.   Hematological: Does not bruise/bleed easily.   Psychiatric/Behavioral: Negative for dysphoric mood and sleep disturbance. The patient is not nervous/anxious.    All other systems reviewed and are negative.          Objective:  Vitals:    07/03/18 1127   BP: 143/77   Pulse: 60   Weight: 79.4 kg (175 lb)   Height: 182.9 cm (72\")     1    07/03/18  1127   Weight: 79.4 kg (175 lb)     Body mass index is 23.73 kg/m².    Physical Exam    Vital signs reviewed.   General: No acute distress.  Eyes: conjunctiva clear; pupils equally round and reactive  ENT: external ears and nose atraumatic; oropharynx clear  CV: no peripheral edema  Resp: normal respiratory effort  Skin: no rashes or wounds; normal turgor  Psych: mood and affect appropriate; recent and remote memory intact       Ortho Exam    Lumbar spine-moderate tenderness palpation on both right and left paraspinal regions, primarily in the L4 to L5 5 regions, mild extension down into the sacroiliac joints bilaterally, negative straight leg raise bilateral lower extremities, mildly positive Tomás test bilaterally with pain localized to the SI joint.  Full range of motion bilateral " hips knees and ankles with 4+ out of 5 strength.  Positive sensation light touch all distibution's bilateral lower extremity.  Increased pain primarily with lateral bend to the left greater than the right.    Imaging:  Lumbar Spine X-Ray  Indication: Pain  AP and Lateral views    Findings:  No fracture  No bony lesion  Normal soft tissues  Normal disc spaces  Mild degenerative change noted that facet joint of L4 5, left greater than right.  No prior studies were available for comparison.    Assessment:       1. Pain    2. Strain of lumbar region, initial encounter          Plan:          Discussed treatment options at length with patient at today's visit. Recommended stretching and therapy program, prescription for Medrol Dosepak given persistent pain despite over-the-counter treatments.  If pain continues to be a problem may consider advanced imaging.    Abram Allen was in agreement with plan and had all questions answered.     Orders:  Orders Placed This Encounter   Procedures   • XR Spine Lumbar 2 or 3 View       Medications:  New Medications Ordered This Visit   Medications   • MethylPREDNISolone (MEDROL, ANDREZ,) 4 MG tablet     Sig: Take as directed on package instructions.     Dispense:  1 tablet     Refill:  0   • cyclobenzaprine (FLEXERIL) 5 MG tablet     Sig: Take 1 tablet by mouth 3 (Three) Times a Day As Needed for Muscle Spasms.     Dispense:  30 tablet     Refill:  0       Followup:  No Follow-up on file.    Abram was seen today for pain.    Diagnoses and all orders for this visit:    Pain  -     XR Spine Lumbar 2 or 3 View    Strain of lumbar region, initial encounter    Other orders  -     MethylPREDNISolone (MEDROL, ANDREZ,) 4 MG tablet; Take as directed on package instructions.  -     cyclobenzaprine (FLEXERIL) 5 MG tablet; Take 1 tablet by mouth 3 (Three) Times a Day As Needed for Muscle Spasms.          Dictated utilizing Dragon dictation

## 2018-07-25 RX ORDER — ATORVASTATIN CALCIUM 20 MG/1
20 TABLET, FILM COATED ORAL DAILY
Qty: 90 TABLET | Refills: 1 | Status: SHIPPED | OUTPATIENT
Start: 2018-07-25 | End: 2019-01-24 | Stop reason: SDUPTHER

## 2018-12-10 ENCOUNTER — OFFICE VISIT (OUTPATIENT)
Dept: CARDIOLOGY | Facility: CLINIC | Age: 53
End: 2018-12-10

## 2018-12-10 VITALS
BODY MASS INDEX: 25.06 KG/M2 | HEIGHT: 72 IN | HEART RATE: 60 BPM | SYSTOLIC BLOOD PRESSURE: 128 MMHG | DIASTOLIC BLOOD PRESSURE: 70 MMHG | WEIGHT: 185 LBS

## 2018-12-10 DIAGNOSIS — I25.10 CORONARY ARTERY DISEASE INVOLVING NATIVE CORONARY ARTERY OF NATIVE HEART WITHOUT ANGINA PECTORIS: Primary | ICD-10-CM

## 2018-12-10 DIAGNOSIS — Z95.5 S/P CORONARY ARTERY STENT PLACEMENT: ICD-10-CM

## 2018-12-10 DIAGNOSIS — E78.5 HYPERLIPIDEMIA, UNSPECIFIED HYPERLIPIDEMIA TYPE: ICD-10-CM

## 2018-12-10 DIAGNOSIS — I44.7 LBBB (LEFT BUNDLE BRANCH BLOCK): ICD-10-CM

## 2018-12-10 PROCEDURE — 93000 ELECTROCARDIOGRAM COMPLETE: CPT | Performed by: INTERNAL MEDICINE

## 2018-12-10 PROCEDURE — 99214 OFFICE O/P EST MOD 30 MIN: CPT | Performed by: INTERNAL MEDICINE

## 2018-12-10 RX ORDER — ASPIRIN 325 MG
325 TABLET ORAL DAILY
Qty: 30 TABLET | Refills: 11 | COMMUNITY
End: 2020-12-02

## 2018-12-10 NOTE — PROGRESS NOTES
Date of Office Visit: 12/10/2018  Encounter Provider: Paula Hall MD  Place of Service: [facility] CARDIOLOGY  Patient Name: Abram Allen  :1965      Patient ID:  Abram Allen is a 53 y.o. male is here for  followup for CAD, s/p stent.         History of Present Illness    Abram Allen came in for an evaluation 2017.  He was having chest heaviness with running.  He has been a lifelong runner. He had a treadmill study done because of the chest pain.  This was done 8/3/2017 showed left bundle branch block with repolarization changes during exercise.  Because of his complaints and the ECG changes, I recommended that he undergo heart catheterization.  This was done 8/10/2017.  This showed normal left main, 10% proximal LAD, 95% mid LAD, normal left circumflex, 10% first obtuse marginal stenosis, 10% proximal RCA stenosis and 30% mid vessel stenosis.  He received a Xience Alpine drug-eluting stent, 3.5 x 18 mm the mid left anterior descending artery.  He had a treadmill stress study which was done 2017.  This showed normal heart rate response and blood pressure response exercise and good exercise tolerance.  He completed cardiac rehabilitation.     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    He had laboratory values done 2018 showing normal CBC and CMP, LDL 77, HDL 50.    He will get laboratory values done with Dr. Hastings 2019.  He's had no chest pain, chest pressure, dyspnea, orthopnea, tachycardia, dizziness or syncope.  He's exercising running 3 times a week and walking the other days a week.  His energy level stable.  He is taking his medications as directed.    Past Medical History:   Diagnosis Date   • Coronary artery disease    • Hyperlipidemia    • Left bundle branch block          Past Surgical History:   Procedure Laterality Date   • ADENOIDECTOMY     • SHOULDER SURGERY Left        Current Outpatient Medications  on File Prior to Visit   Medication Sig Dispense Refill   • aspirin 81 MG tablet Take 1 tablet by mouth Daily. 90 tablet 3   • atorvastatin (LIPITOR) 20 MG tablet Take 1 tablet by mouth Daily. 90 tablet 1   • ticagrelor (BRILINTA) 90 MG tablet tablet Take 1 tablet by mouth 2 (Two) Times a Day. 180 tablet 1   • hepatitis A (HAVRIX) 1440 EL U/ML vaccine Inject  into the shoulder, thigh, or buttocks. 1 mL 0   • [DISCONTINUED] cyclobenzaprine (FLEXERIL) 5 MG tablet Take 1 tablet by mouth 3 (Three) Times a Day As Needed for Muscle Spasms. 30 tablet 0   • [DISCONTINUED] MethylPREDNISolone (MEDROL, ANDREZ,) 4 MG tablet Take as directed on package instructions. 1 tablet 0     No current facility-administered medications on file prior to visit.        Social History     Socioeconomic History   • Marital status:      Spouse name: Not on file   • Number of children: Not on file   • Years of education: Not on file   • Highest education level: Not on file   Social Needs   • Financial resource strain: Not on file   • Food insecurity - worry: Not on file   • Food insecurity - inability: Not on file   • Transportation needs - medical: Not on file   • Transportation needs - non-medical: Not on file   Occupational History   • Not on file   Tobacco Use   • Smoking status: Never Smoker   • Smokeless tobacco: Never Used   • Tobacco comment: caffeine use   Substance and Sexual Activity   • Alcohol use: No   • Drug use: No   • Sexual activity: Defer   Other Topics Concern   • Not on file   Social History Narrative   • Not on file           Review of Systems   Constitution: Negative.   HENT: Negative for congestion.    Eyes: Negative for vision loss in left eye and vision loss in right eye.   Respiratory: Negative.  Negative for cough, hemoptysis, shortness of breath, sleep disturbances due to breathing, snoring, sputum production and wheezing.    Endocrine: Negative.    Hematologic/Lymphatic: Negative.    Skin: Negative for poor wound  "healing and rash.   Musculoskeletal: Negative for falls, gout, muscle cramps and myalgias.   Gastrointestinal: Negative for abdominal pain, diarrhea, dysphagia, hematemesis, melena, nausea and vomiting.   Neurological: Negative for excessive daytime sleepiness, dizziness, headaches, light-headedness, loss of balance, seizures and vertigo.   Psychiatric/Behavioral: Negative for depression and substance abuse. The patient is not nervous/anxious.        Procedures    ECG 12 Lead  Date/Time: 12/10/2018 11:31 AM  Performed by: Paula Hall MD  Authorized by: Paula Hall MD   Comparison: compared with previous ECG   Similar to previous ECG  Rhythm: sinus rhythm  Conduction: left bundle branch block  Clinical impression: abnormal ECG                Objective:      Vitals:    12/10/18 1109   BP: 128/70   BP Location: Right arm   Patient Position: Sitting   Pulse: 60   Weight: 83.9 kg (185 lb)   Height: 182.9 cm (72\")     Body mass index is 25.09 kg/m².    Physical Exam   Constitutional: He is oriented to person, place, and time. He appears well-developed and well-nourished. No distress.   HENT:   Head: Normocephalic and atraumatic.   Eyes: Conjunctivae are normal. No scleral icterus.   Neck: Neck supple. No JVD present. Carotid bruit is not present. No thyromegaly present.   Cardiovascular: Normal rate, regular rhythm, S1 normal, S2 normal, normal heart sounds and intact distal pulses.  No extrasystoles are present. PMI is not displaced. Exam reveals no gallop.   No murmur heard.  Pulses:       Carotid pulses are 2+ on the right side, and 2+ on the left side.       Radial pulses are 2+ on the right side, and 2+ on the left side.        Dorsalis pedis pulses are 2+ on the right side, and 2+ on the left side.        Posterior tibial pulses are 2+ on the right side, and 2+ on the left side.   Pulmonary/Chest: Effort normal and breath sounds normal. No respiratory distress. He has no wheezes. He has no " rhonchi. He has no rales. He exhibits no tenderness.   Abdominal: Soft. Bowel sounds are normal. He exhibits no distension, no abdominal bruit and no mass. There is no tenderness.   Musculoskeletal: He exhibits no edema or deformity.   Lymphadenopathy:     He has no cervical adenopathy.   Neurological: He is alert and oriented to person, place, and time. No cranial nerve deficit.   Skin: Skin is warm and dry. No rash noted. He is not diaphoretic. No cyanosis. No pallor. Nails show no clubbing.   Psychiatric: He has a normal mood and affect. Judgment normal.   Vitals reviewed.      Lab Review:       Assessment:      Diagnosis Plan   1. Coronary artery disease involving native coronary artery of native heart without angina pectoris     2. LBBB (left bundle branch block)     3. Hyperlipidemia, unspecified hyperlipidemia type     4. S/P coronary artery stent placement       1. CAD, s/p LAD stent 8/10/17, with presentation of unstable angina.  No further angina  2. Hyperlipidemia, treated.   3. LBBB on ECG.   4. High stress job, has made changes.           Plan:       Stop brilinta and go to asa 325mg daily.  See back in 1 year.     Coronary Artery Disease  Assessment  • The patient has no angina    Subjective - Objective  • There has been a previous stent procedure using PADMINI  • Current antiplatelet therapy includes aspirin 325 mg

## 2019-01-25 RX ORDER — ATORVASTATIN CALCIUM 20 MG/1
TABLET, FILM COATED ORAL
Qty: 90 TABLET | Refills: 0 | Status: SHIPPED | OUTPATIENT
Start: 2019-01-25 | End: 2019-04-23 | Stop reason: SDUPTHER

## 2019-04-24 RX ORDER — ATORVASTATIN CALCIUM 20 MG/1
20 TABLET, FILM COATED ORAL DAILY
Qty: 90 TABLET | Refills: 0 | Status: SHIPPED | OUTPATIENT
Start: 2019-04-24 | End: 2019-07-30 | Stop reason: SDUPTHER

## 2019-07-30 DIAGNOSIS — E78.2 MIXED HYPERLIPIDEMIA: Primary | ICD-10-CM

## 2019-07-31 RX ORDER — ATORVASTATIN CALCIUM 20 MG/1
TABLET, FILM COATED ORAL
Qty: 90 TABLET | Refills: 0 | Status: SHIPPED | OUTPATIENT
Start: 2019-07-31 | End: 2019-11-01 | Stop reason: SDUPTHER

## 2019-08-21 ENCOUNTER — TELEPHONE (OUTPATIENT)
Dept: CARDIOLOGY | Facility: CLINIC | Age: 54
End: 2019-08-21

## 2019-08-21 NOTE — TELEPHONE ENCOUNTER
Called and s/w pt the patient will have his physical test with PCP in September. He will fax the result to the office.........Diana

## 2019-09-11 ENCOUNTER — TELEPHONE (OUTPATIENT)
Dept: CARDIOLOGY | Facility: CLINIC | Age: 54
End: 2019-09-11

## 2019-09-11 NOTE — TELEPHONE ENCOUNTER
----- Message from Paula Hall MD sent at 9/11/2019  2:40 PM EDT -----  Labs look good. pls call.     rm

## 2019-11-01 RX ORDER — ATORVASTATIN CALCIUM 20 MG/1
TABLET, FILM COATED ORAL
Qty: 90 TABLET | Refills: 1 | Status: SHIPPED | OUTPATIENT
Start: 2019-11-01 | End: 2020-05-14

## 2019-12-16 ENCOUNTER — OFFICE VISIT (OUTPATIENT)
Dept: CARDIOLOGY | Facility: CLINIC | Age: 54
End: 2019-12-16

## 2019-12-16 VITALS
SYSTOLIC BLOOD PRESSURE: 132 MMHG | BODY MASS INDEX: 26.14 KG/M2 | HEART RATE: 59 BPM | DIASTOLIC BLOOD PRESSURE: 72 MMHG | HEIGHT: 72 IN | OXYGEN SATURATION: 99 % | WEIGHT: 193 LBS

## 2019-12-16 DIAGNOSIS — E78.5 HYPERLIPIDEMIA, UNSPECIFIED HYPERLIPIDEMIA TYPE: ICD-10-CM

## 2019-12-16 DIAGNOSIS — I44.7 LBBB (LEFT BUNDLE BRANCH BLOCK): ICD-10-CM

## 2019-12-16 DIAGNOSIS — Z95.5 S/P CORONARY ARTERY STENT PLACEMENT: ICD-10-CM

## 2019-12-16 DIAGNOSIS — I25.10 CORONARY ARTERY DISEASE INVOLVING NATIVE CORONARY ARTERY OF NATIVE HEART WITHOUT ANGINA PECTORIS: Primary | ICD-10-CM

## 2019-12-16 PROCEDURE — 99214 OFFICE O/P EST MOD 30 MIN: CPT | Performed by: INTERNAL MEDICINE

## 2019-12-16 PROCEDURE — 93000 ELECTROCARDIOGRAM COMPLETE: CPT | Performed by: INTERNAL MEDICINE

## 2019-12-16 NOTE — PROGRESS NOTES
Date of Office Visit: 2019  Encounter Provider: Paula Hall MD  Place of Service: [facility] CARDIOLOGY  Patient Name: Abram Allen  :1965      Patient ID:  Abram Allen is a 54 y.o. male is here for  followup for CAD.         History of Present Illness    Abram Allen came in for an evaluation 2017.  He was having chest heaviness with running.  He has been a lifelong runner. He had a treadmill study done because of the chest pain.  This was done 8/3/2017 showed left bundle branch block with repolarization changes during exercise.  Because of his complaints and the ECG changes, I recommended that he undergo heart catheterization.  This was done 8/10/2017.  This showed normal left main, 10% proximal LAD, 95% mid LAD, normal left circumflex, 10% first obtuse marginal stenosis, 10% proximal RCA stenosis and 30% mid vessel stenosis.  He received a Xience Alpine drug-eluting stent, 3.5 x 18 mm the mid left anterior descending artery.  He had a treadmill stress study which was done 2017.  This showed normal heart rate response and blood pressure response exercise and good exercise tolerance.  He completed cardiac rehabilitation.     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     He has no chest pain, pressure, tachycardia, dizziness, syncope.  He has no orthopnea or PND.  He said his medications as directed.  He is exercising.  He is working a lot again.  He had labs in a couple months ago with Nikko Hastings.  He has mild exertional dyspnea but does notice when he runs, he is short winded after about 3 miles.    Past Medical History:   Diagnosis Date   • Coronary artery disease    • Hyperlipidemia    • Left bundle branch block          Past Surgical History:   Procedure Laterality Date   • ADENOIDECTOMY     • CARDIAC CATHETERIZATION N/A 8/10/2017    by Eneida Black MD; Left main normal, LAD scattered 10% proximal  stenosis, mid LAD 95% concentric stenosis near focal area of calcification, left circumflex normal, first obtuse marginal branch proximal 10% stenosis, and right coronary artery with a 10% proximal and a 30% mid stenosis   • CARDIAC CATHETERIZATION N/A 8/10/2017    by Eneida Black MD; Xience Alpine drug-eluting stent placement 3.5×18 mm to the mid LAD     • SHOULDER SURGERY Left        Current Outpatient Medications on File Prior to Visit   Medication Sig Dispense Refill   • aspirin 325 MG tablet Take 1 tablet by mouth Daily. 30 tablet 11   • atorvastatin (LIPITOR) 20 MG tablet TAKE ONE TABLET BY MOUTH DAILY 90 tablet 1   • [DISCONTINUED] hepatitis A (HAVRIX) 1440 EL U/ML vaccine Inject  into the shoulder, thigh, or buttocks. 1 mL 0   • [DISCONTINUED] hepatitis A (HAVRIX) 1440 EL U/ML vaccine Inject  into the appropriate muscle as directed by prescriber. 1 mL 0     No current facility-administered medications on file prior to visit.        Social History     Socioeconomic History   • Marital status:      Spouse name: Not on file   • Number of children: Not on file   • Years of education: Not on file   • Highest education level: Not on file   Tobacco Use   • Smoking status: Never Smoker   • Smokeless tobacco: Never Used   • Tobacco comment: caffeine use   Substance and Sexual Activity   • Alcohol use: No   • Drug use: No   • Sexual activity: Defer           Review of Systems   Constitution: Negative.   HENT: Negative for congestion.    Eyes: Negative for vision loss in left eye and vision loss in right eye.   Respiratory: Negative.  Negative for cough, hemoptysis, shortness of breath, sleep disturbances due to breathing, snoring, sputum production and wheezing.    Endocrine: Negative.    Hematologic/Lymphatic: Negative.    Skin: Negative for poor wound healing and rash.   Musculoskeletal: Negative for falls, gout, muscle cramps and myalgias.   Gastrointestinal: Negative for abdominal pain, diarrhea,  "dysphagia, hematemesis, melena, nausea and vomiting.   Neurological: Negative for excessive daytime sleepiness, dizziness, headaches, light-headedness, loss of balance, seizures and vertigo.   Psychiatric/Behavioral: Negative for depression and substance abuse. The patient is not nervous/anxious.        Procedures    ECG 12 Lead  Date/Time: 12/16/2019 11:35 AM  Performed by: Paula Hall MD  Authorized by: Paula Hall MD   Comparison: compared with previous ECG   Similar to previous ECG  Rhythm: sinus rhythm  Conduction: left bundle branch block    Clinical impression: abnormal EKG                Objective:      Vitals:    12/16/19 1128   BP: 132/72   BP Location: Right arm   Patient Position: Sitting   Cuff Size: Adult   Pulse: 59   SpO2: 99%   Weight: 87.5 kg (193 lb)   Height: 182.9 cm (72\")     Body mass index is 26.18 kg/m².    Physical Exam   Constitutional: He is oriented to person, place, and time. He appears well-developed and well-nourished. No distress.   HENT:   Head: Normocephalic and atraumatic.   Eyes: Conjunctivae are normal. No scleral icterus.   Neck: Neck supple. No JVD present. Carotid bruit is not present. No thyromegaly present.   Cardiovascular: Normal rate, regular rhythm, S1 normal, S2 normal and intact distal pulses.  No extrasystoles are present. PMI is not displaced. Exam reveals no gallop.   No murmur heard.  Pulses:       Carotid pulses are 2+ on the right side, and 2+ on the left side.       Radial pulses are 2+ on the right side, and 2+ on the left side.        Dorsalis pedis pulses are 2+ on the right side, and 2+ on the left side.        Posterior tibial pulses are 2+ on the right side, and 2+ on the left side.   Pulmonary/Chest: Effort normal and breath sounds normal. No respiratory distress. He has no wheezes. He has no rhonchi. He has no rales. He exhibits no tenderness.   Abdominal: Soft. Bowel sounds are normal. He exhibits no distension, no abdominal bruit " and no mass. There is no tenderness.   Musculoskeletal: He exhibits no edema or deformity.   Lymphadenopathy:     He has no cervical adenopathy.   Neurological: He is alert and oriented to person, place, and time. No cranial nerve deficit.   Skin: Skin is warm and dry. No rash noted. He is not diaphoretic. No cyanosis. No pallor. Nails show no clubbing.   Psychiatric: He has a normal mood and affect. Judgment normal.   Vitals reviewed.      Lab Review:       Assessment:      Diagnosis Plan   1. Coronary artery disease involving native coronary artery of native heart without angina pectoris     2. LBBB (left bundle branch block)     3. S/P coronary artery stent placement     4. Hyperlipidemia, unspecified hyperlipidemia type       1. CAD, s/p LAD stent 8/10/17, with presentation of unstable angina.  No further angina  2. Hyperlipidemia, treated.   3. LBBB on ECG.   4. High stress job, has made changes.      Plan:       See back in 1 year, no med changes, will get labs.  Labs done 11/18/2019 show normal CMP, total cholesterol 221, HDL 35, , triglycerides 151.

## 2020-05-14 RX ORDER — ATORVASTATIN CALCIUM 20 MG/1
TABLET, FILM COATED ORAL
Qty: 90 TABLET | Refills: 1 | Status: SHIPPED | OUTPATIENT
Start: 2020-05-14 | End: 2020-11-24

## 2020-09-11 ENCOUNTER — TRANSCRIBE ORDERS (OUTPATIENT)
Dept: LAB | Facility: HOSPITAL | Age: 55
End: 2020-09-11

## 2020-09-11 DIAGNOSIS — Z01.818 OTHER SPECIFIED PRE-OPERATIVE EXAMINATION: Primary | ICD-10-CM

## 2020-09-14 ENCOUNTER — LAB (OUTPATIENT)
Dept: LAB | Facility: HOSPITAL | Age: 55
End: 2020-09-14

## 2020-09-14 DIAGNOSIS — Z01.818 OTHER SPECIFIED PRE-OPERATIVE EXAMINATION: ICD-10-CM

## 2020-09-14 PROCEDURE — U0004 COV-19 TEST NON-CDC HGH THRU: HCPCS

## 2020-09-14 PROCEDURE — C9803 HOPD COVID-19 SPEC COLLECT: HCPCS

## 2020-09-15 LAB — SARS-COV-2 RNA NOSE QL NAA+PROBE: NOT DETECTED

## 2020-09-17 ENCOUNTER — LAB (OUTPATIENT)
Dept: LAB | Facility: HOSPITAL | Age: 55
End: 2020-09-17

## 2020-09-17 DIAGNOSIS — Z01.818 OTHER SPECIFIED PRE-OPERATIVE EXAMINATION: ICD-10-CM

## 2020-09-17 PROCEDURE — U0004 COV-19 TEST NON-CDC HGH THRU: HCPCS

## 2020-09-17 PROCEDURE — C9803 HOPD COVID-19 SPEC COLLECT: HCPCS

## 2020-09-18 ENCOUNTER — TRANSCRIBE ORDERS (OUTPATIENT)
Dept: LAB | Facility: HOSPITAL | Age: 55
End: 2020-09-18

## 2020-09-18 DIAGNOSIS — Z01.818 OTHER SPECIFIED PRE-OPERATIVE EXAMINATION: Primary | ICD-10-CM

## 2020-09-18 LAB — SARS-COV-2 RNA NOSE QL NAA+PROBE: NOT DETECTED

## 2020-09-21 ENCOUNTER — LAB (OUTPATIENT)
Dept: LAB | Facility: HOSPITAL | Age: 55
End: 2020-09-21

## 2020-09-21 DIAGNOSIS — Z01.818 OTHER SPECIFIED PRE-OPERATIVE EXAMINATION: ICD-10-CM

## 2020-09-23 ENCOUNTER — LAB (OUTPATIENT)
Dept: LAB | Facility: HOSPITAL | Age: 55
End: 2020-09-23

## 2020-09-23 ENCOUNTER — CONSULT (OUTPATIENT)
Dept: ONCOLOGY | Facility: CLINIC | Age: 55
End: 2020-09-23

## 2020-09-23 VITALS
HEIGHT: 72 IN | WEIGHT: 184.4 LBS | BODY MASS INDEX: 24.98 KG/M2 | DIASTOLIC BLOOD PRESSURE: 82 MMHG | OXYGEN SATURATION: 100 % | SYSTOLIC BLOOD PRESSURE: 135 MMHG | HEART RATE: 65 BPM | TEMPERATURE: 97.7 F | RESPIRATION RATE: 12 BRPM

## 2020-09-23 DIAGNOSIS — Z80.9 FAMILY HX-MALIGNANCY: ICD-10-CM

## 2020-09-23 DIAGNOSIS — R89.9 ABNORMAL LABORATORY TEST: Primary | ICD-10-CM

## 2020-09-23 DIAGNOSIS — C86.6 CUTANEOUS CD30+ LYMPHOPROLIFERATIVE DISORDER (HCC): Primary | ICD-10-CM

## 2020-09-23 PROBLEM — C86.60: Status: ACTIVE | Noted: 2020-09-23

## 2020-09-23 LAB
ALBUMIN SERPL-MCNC: 4.6 G/DL (ref 3.5–5.2)
ALBUMIN/GLOB SERPL: 1.6 G/DL
ALP SERPL-CCNC: 60 U/L (ref 39–117)
ALT SERPL W P-5'-P-CCNC: 22 U/L (ref 1–41)
ANION GAP SERPL CALCULATED.3IONS-SCNC: 10.4 MMOL/L (ref 5–15)
AST SERPL-CCNC: 20 U/L (ref 1–40)
BASOPHILS # BLD AUTO: 0.03 10*3/MM3 (ref 0–0.2)
BASOPHILS NFR BLD AUTO: 0.4 % (ref 0–1.5)
BILIRUB SERPL-MCNC: 0.5 MG/DL (ref 0–1.2)
BUN SERPL-MCNC: 12 MG/DL (ref 6–20)
BUN/CREAT SERPL: 12.6 (ref 7–25)
CALCIUM SPEC-SCNC: 9.3 MG/DL (ref 8.6–10.5)
CHLORIDE SERPL-SCNC: 101 MMOL/L (ref 98–107)
CO2 SERPL-SCNC: 27.6 MMOL/L (ref 22–29)
CREAT SERPL-MCNC: 0.95 MG/DL (ref 0.76–1.27)
DEPRECATED RDW RBC AUTO: 38.7 FL (ref 37–54)
EOSINOPHIL # BLD AUTO: 0.17 10*3/MM3 (ref 0–0.4)
EOSINOPHIL NFR BLD AUTO: 2.2 % (ref 0.3–6.2)
ERYTHROCYTE [DISTWIDTH] IN BLOOD BY AUTOMATED COUNT: 12.1 % (ref 12.3–15.4)
GFR SERPL CREATININE-BSD FRML MDRD: 83 ML/MIN/1.73
GLOBULIN UR ELPH-MCNC: 2.8 GM/DL
GLUCOSE SERPL-MCNC: 105 MG/DL (ref 65–99)
HCT VFR BLD AUTO: 42.7 % (ref 37.5–51)
HGB BLD-MCNC: 14.3 G/DL (ref 13–17.7)
IMM GRANULOCYTES # BLD AUTO: 0.07 10*3/MM3 (ref 0–0.05)
IMM GRANULOCYTES NFR BLD AUTO: 0.9 % (ref 0–0.5)
LDH SERPL-CCNC: 181 U/L (ref 135–225)
LYMPHOCYTES # BLD AUTO: 1.79 10*3/MM3 (ref 0.7–3.1)
LYMPHOCYTES NFR BLD AUTO: 23 % (ref 19.6–45.3)
MCH RBC QN AUTO: 29.1 PG (ref 26.6–33)
MCHC RBC AUTO-ENTMCNC: 33.5 G/DL (ref 31.5–35.7)
MCV RBC AUTO: 87 FL (ref 79–97)
MONOCYTES # BLD AUTO: 0.66 10*3/MM3 (ref 0.1–0.9)
MONOCYTES NFR BLD AUTO: 8.5 % (ref 5–12)
NEUTROPHILS NFR BLD AUTO: 5.06 10*3/MM3 (ref 1.7–7)
NEUTROPHILS NFR BLD AUTO: 65 % (ref 42.7–76)
NRBC BLD AUTO-RTO: 0 /100 WBC (ref 0–0.2)
PLATELET # BLD AUTO: 241 10*3/MM3 (ref 140–450)
PMV BLD AUTO: 10.8 FL (ref 6–12)
POTASSIUM SERPL-SCNC: 4.3 MMOL/L (ref 3.5–5.2)
PROT SERPL-MCNC: 7.4 G/DL (ref 6–8.5)
RBC # BLD AUTO: 4.91 10*6/MM3 (ref 4.14–5.8)
SODIUM SERPL-SCNC: 139 MMOL/L (ref 136–145)
WBC # BLD AUTO: 7.78 10*3/MM3 (ref 3.4–10.8)

## 2020-09-23 PROCEDURE — 36415 COLL VENOUS BLD VENIPUNCTURE: CPT

## 2020-09-23 PROCEDURE — 83615 LACTATE (LD) (LDH) ENZYME: CPT | Performed by: INTERNAL MEDICINE

## 2020-09-23 PROCEDURE — 86334 IMMUNOFIX E-PHORESIS SERUM: CPT | Performed by: INTERNAL MEDICINE

## 2020-09-23 PROCEDURE — 85025 COMPLETE CBC W/AUTO DIFF WBC: CPT

## 2020-09-23 PROCEDURE — 99244 OFF/OP CNSLTJ NEW/EST MOD 40: CPT | Performed by: INTERNAL MEDICINE

## 2020-09-23 PROCEDURE — 80053 COMPREHEN METABOLIC PANEL: CPT | Performed by: INTERNAL MEDICINE

## 2020-09-23 PROCEDURE — 82784 ASSAY IGA/IGD/IGG/IGM EACH: CPT | Performed by: INTERNAL MEDICINE

## 2020-09-23 NOTE — PROGRESS NOTES
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 Das    RECORDS OBTAINED:  Records of the patients history including those obtained from the referring provider were reviewed and summarized in detail.      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  The patient had rather rapid onset of 3 skin lesions in July 2021 on the left chest beneath the axilla, another on the left thigh and the third I believe was on the right lower extremity.  The lesions were raised and a little tender without pruritus.  He had no known bug bites prior to the lesions developing.  He was seen by dermatology and the skin lesion below the left axilla was biopsied and showed a CD30 positive T-cell lymphoproliferative disorder with special stains highlighting CD30 positive aberrant T cells with a background of small lymphocytes, neutrophils, histiocytes and eosinophils.  The ALK gene rearrangement was negative.  Differential diagnosis was given for lymphomatoid papulosis, primary cutaneous anaplastic T-cell lymphoma, less likely exuberant response to an arthropod bite or infectious process.  The patient was prescribed antibiotics by dermatology.  Over the past few weeks the skin lesions have significantly regressed.    The patient has no prior history of such skin lesions.  He has mild fatigue but denies significant weight loss and has no overt night sweats.  He has decent energy.  He denies abdominal pain or loss of appetite.  He has not noted lymphadenopathy.  He denies fevers or chills.    Family history is pertinent for father with pancreatic cancer, paternal grandmother with pancreatic cancer in the patient's sister had breast cancer at a young age.    Past Medical History:    Diagnosis Date   • Coronary artery disease    • Hyperlipidemia    • Left bundle branch block         Past Surgical History:   Procedure Laterality Date   • ADENOIDECTOMY     • CARDIAC CATHETERIZATION N/A 8/10/2017    by Eneida Black MD; Left main normal, LAD scattered 10% proximal stenosis, mid LAD 95% concentric stenosis near focal area of calcification, left circumflex normal, first obtuse marginal branch proximal 10% stenosis, and right coronary artery with a 10% proximal and a 30% mid stenosis   • CARDIAC CATHETERIZATION N/A 8/10/2017    by Eneida Black MD; Xience Alpine drug-eluting stent placement 3.5×18 mm to the mid LAD     • SHOULDER SURGERY Left         Current Outpatient Medications on File Prior to Visit   Medication Sig Dispense Refill   • aspirin 325 MG tablet Take 1 tablet by mouth Daily. 30 tablet 11   • atorvastatin (LIPITOR) 20 MG tablet TAKE ONE TABLET BY MOUTH DAILY 90 tablet 1     No current facility-administered medications on file prior to visit.         ALLERGIES:  No Known Allergies     Social History     Socioeconomic History   • Marital status:      Spouse name: Not on file   • Number of children: Not on file   • Years of education: Not on file   • Highest education level: Not on file   Tobacco Use   • Smoking status: Never Smoker   • Smokeless tobacco: Never Used   • Tobacco comment: caffeine use   Substance and Sexual Activity   • Alcohol use: No   • Drug use: No   • Sexual activity: Defer        Family History   Problem Relation Age of Onset   • Cancer Father    • Heart attack Maternal Grandmother    • Cancer Paternal Grandmother         Review of Systems   Constitutional: Positive for activity change and fatigue. Negative for appetite change and unexpected weight change.   HENT: Negative.    Respiratory: Negative.    Cardiovascular: Negative.    Gastrointestinal: Negative.    Genitourinary: Negative.    Musculoskeletal: Negative.    Skin:        See the HPI  "  Allergic/Immunologic: Negative.    Neurological: Negative.    Hematological: Negative.    Psychiatric/Behavioral: Negative.         Objective     Vitals:    09/23/20 0928   BP: 135/82   Pulse: 65   Resp: 12   Temp: 97.7 °F (36.5 °C)   TempSrc: Tympanic   SpO2: 100%   Weight: 83.6 kg (184 lb 6.4 oz)   Height: 183.8 cm (72.36\")   PainSc: 0-No pain     Current Status 9/23/2020   ECOG score 0       Physical Exam    CONSTITUTIONAL: pleasant well-developed adult man  HEENT: no icterus, no thrush, moist membranes  NECK: no jvd  LYMPH: no cervical or supraclavicular lad or axillary lad  CV: RRR, S1S2, no murmur  RESP: cta bilat, no wheezing, no rales  GI: soft, non-tender, no splenomegaly, +bs  MUSC: no edema, normal gait  NEURO: alert and oriented x3, normal strength  PSYCH: normal mood and affect  SKIN: skin lesions left chest below axilla, left thigh and RLE now flat and discolored    RECENT LABS:  Hematology WBC   Date Value Ref Range Status   09/23/2020 7.78 3.40 - 10.80 10*3/mm3 Final     RBC   Date Value Ref Range Status   09/23/2020 4.91 4.14 - 5.80 10*6/mm3 Final     Hemoglobin   Date Value Ref Range Status   09/23/2020 14.3 13.0 - 17.7 g/dL Final     Hematocrit   Date Value Ref Range Status   09/23/2020 42.7 37.5 - 51.0 % Final     Platelets   Date Value Ref Range Status   09/23/2020 241 140 - 450 10*3/mm3 Final        Lab Results   Component Value Date    GLUCOSE 94 02/09/2018    BUN 17 02/09/2018    CREATININE 0.91 02/09/2018    EGFRIFNONA 88 02/09/2018    BCR 18.7 02/09/2018    K 4.1 02/09/2018    CO2 27.0 02/09/2018    CALCIUM 9.0 02/09/2018    ALBUMIN 4.40 02/09/2018    AST 18 02/09/2018    ALT 26 02/09/2018         Assessment/Plan     1.  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  Differential diagnosis given by pathology for lymphomatoid " papulosis, anaplastic T-cell lymphoma of the skin (rule out systemic), reactive process etc.  The patient is having no significant constitutional symptoms other than mild fatigue.  CBC is normal with no lymphocytosis.  However given the skin findings I do think he needs evaluation for systemic lymphoma.  I have recommended that we check a CMP, LDH and peripheral blood flow cytometry to rule out monoclonal lymphoid populations.  I also recommended imaging with CT chest, abdomen, pelvis or PET scan with NCCN giving preference to PET scan under the circumstances.  We will proceed with a PET scan if insurance approves.  I will see the patient back to review results.    2.  Familial history of pancreatic cancer affecting the patient's father and paternal grandmother and sister with early age of breast cancer.  I discussed with the patient this sounds concerning for possible genetic malignancy trait such as a BRCA mutation.  I recommended the patient to be seen by genetics and he was agreeable after the above work-up is complete.      Thank you for allowing me to participate in the patient's care.

## 2020-09-25 LAB
IGA SERPL-MCNC: 180 MG/DL (ref 90–386)
IGG SERPL-MCNC: 1030 MG/DL (ref 603–1613)
IGM SERPL-MCNC: 109 MG/DL (ref 20–172)
PROT PATTERN SERPL IFE-IMP: NORMAL

## 2020-09-28 ENCOUNTER — LAB (OUTPATIENT)
Dept: LAB | Facility: HOSPITAL | Age: 55
End: 2020-09-28

## 2020-09-28 ENCOUNTER — HOSPITAL ENCOUNTER (OUTPATIENT)
Dept: PET IMAGING | Facility: HOSPITAL | Age: 55
Discharge: HOME | End: 2020-09-28

## 2020-09-28 DIAGNOSIS — C86.6 CUTANEOUS CD30+ LYMPHOPROLIFERATIVE DISORDER (HCC): ICD-10-CM

## 2020-09-28 LAB — GLUCOSE BLDC GLUCOMTR-MCNC: 89 MG/DL (ref 70–130)

## 2020-09-28 PROCEDURE — 36415 COLL VENOUS BLD VENIPUNCTURE: CPT | Performed by: INTERNAL MEDICINE

## 2020-09-28 PROCEDURE — 0 FLUDEOXYGLUCOSE F18 SOLUTION: Performed by: INTERNAL MEDICINE

## 2020-09-28 PROCEDURE — 82962 GLUCOSE BLOOD TEST: CPT

## 2020-09-28 PROCEDURE — 78815 PET IMAGE W/CT SKULL-THIGH: CPT

## 2020-09-28 PROCEDURE — A9552 F18 FDG: HCPCS | Performed by: INTERNAL MEDICINE

## 2020-09-28 RX ADMIN — FLUDEOXYGLUCOSE F18 1 DOSE: 300 INJECTION INTRAVENOUS at 12:15

## 2020-09-29 LAB — REF LAB TEST METHOD: NORMAL

## 2020-09-30 ENCOUNTER — LAB (OUTPATIENT)
Dept: LAB | Facility: HOSPITAL | Age: 55
End: 2020-09-30

## 2020-09-30 ENCOUNTER — OFFICE VISIT (OUTPATIENT)
Dept: ONCOLOGY | Facility: CLINIC | Age: 55
End: 2020-09-30

## 2020-09-30 VITALS
HEART RATE: 72 BPM | TEMPERATURE: 98.4 F | OXYGEN SATURATION: 98 % | RESPIRATION RATE: 12 BRPM | BODY MASS INDEX: 25.45 KG/M2 | HEIGHT: 72 IN | DIASTOLIC BLOOD PRESSURE: 88 MMHG | SYSTOLIC BLOOD PRESSURE: 138 MMHG | WEIGHT: 187.9 LBS

## 2020-09-30 DIAGNOSIS — R89.9 ABNORMAL LABORATORY TEST: Primary | ICD-10-CM

## 2020-09-30 DIAGNOSIS — C86.6 CUTANEOUS CD30+ LYMPHOPROLIFERATIVE DISORDER (HCC): Primary | ICD-10-CM

## 2020-09-30 DIAGNOSIS — R91.1 LUNG NODULE: ICD-10-CM

## 2020-09-30 PROCEDURE — 99213 OFFICE O/P EST LOW 20 MIN: CPT | Performed by: INTERNAL MEDICINE

## 2020-09-30 NOTE — PROGRESS NOTES
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 Das    RECORDS OBTAINED:  Records of the patients history including those obtained from the referring provider were reviewed and summarized in detail.      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  The patient had rather rapid onset of 3 skin lesions in July 2021 on the left chest beneath the axilla, another on the left thigh and the third I believe was on the right lower extremity.  The lesions were raised and a little tender without pruritus.  He had no known bug bites prior to the lesions developing.  He was seen by dermatology and the skin lesion below the left axilla was biopsied and showed a CD30 positive T-cell lymphoproliferative disorder with special stains highlighting CD30 positive aberrant T cells with a background of small lymphocytes, neutrophils, histiocytes and eosinophils.  The ALK gene rearrangement was negative.  Differential diagnosis was given for lymphomatoid papulosis, primary cutaneous anaplastic T-cell lymphoma, less likely exuberant response to an arthropod bite or infectious process.  The patient was prescribed antibiotics by dermatology.  Over the past few weeks the skin lesions have significantly regressed.    The patient has no prior history of such skin lesions.  He has mild fatigue but denies significant weight loss and has no overt night sweats.  He has decent energy.  He denies abdominal pain or loss of appetite.  He has not noted lymphadenopathy.  He denies fevers or chills.    Family history is pertinent for father with pancreatic cancer, paternal grandmother with pancreatic cancer in the patient's sister had breast cancer at a young age.    Bree was seen initially  on 9/23/2020.  He had a normal CBC and differential with no lymphocytosis.  LDH normal.  Serum immunofixation showed no monoclonality.  Peripheral blood flow cytometry showed no abnormal myeloid or lymphoid populations in the peripheral blood.  He underwent a PET scan on 9/28/2020 which showed no definite FDG avid disease in the neck, chest, abdomen or pelvis.  There was a normal size right cervical lymph node with mild uptake favored to be reactive and a sub-6 mm pulmonary nodule in the left upper lobe.  No hypermetabolic skin lesions.    Past Medical History:   Diagnosis Date   • Coronary artery disease    • Hyperlipidemia    • Left bundle branch block         Past Surgical History:   Procedure Laterality Date   • ADENOIDECTOMY     • CARDIAC CATHETERIZATION N/A 8/10/2017    by Eneida Black MD; Left main normal, LAD scattered 10% proximal stenosis, mid LAD 95% concentric stenosis near focal area of calcification, left circumflex normal, first obtuse marginal branch proximal 10% stenosis, and right coronary artery with a 10% proximal and a 30% mid stenosis   • CARDIAC CATHETERIZATION N/A 8/10/2017    by Eneida Black MD; Xience Alpine drug-eluting stent placement 3.5×18 mm to the mid LAD     • SHOULDER SURGERY Left         Current Outpatient Medications on File Prior to Visit   Medication Sig Dispense Refill   • aspirin 325 MG tablet Take 1 tablet by mouth Daily. 30 tablet 11   • atorvastatin (LIPITOR) 20 MG tablet TAKE ONE TABLET BY MOUTH DAILY 90 tablet 1     No current facility-administered medications on file prior to visit.         ALLERGIES:  No Known Allergies     Social History     Socioeconomic History   • Marital status:      Spouse name: Not on file   • Number of children: Not on file   • Years of education: Not on file   • Highest education level: Not on file   Tobacco Use   • Smoking status: Never Smoker   • Smokeless tobacco: Never Used   • Tobacco comment: caffeine use   Substance and  "Sexual Activity   • Alcohol use: No   • Drug use: No   • Sexual activity: Defer        Family History   Problem Relation Age of Onset   • Cancer Father    • Heart attack Maternal Grandmother    • Cancer Paternal Grandmother         Review of Systems   Constitutional: Positive for activity change and fatigue. Negative for appetite change and unexpected weight change.   HENT: Negative.    Respiratory: Negative.    Cardiovascular: Negative.    Gastrointestinal: Negative.    Genitourinary: Negative.    Musculoskeletal: Negative.    Skin:        See the HPI   Allergic/Immunologic: Negative.    Neurological: Negative.    Hematological: Negative.    Psychiatric/Behavioral: Negative.         Objective     Vitals:    09/30/20 1434   BP: 138/88   Pulse: 72   Resp: 12   Temp: 98.4 °F (36.9 °C)   TempSrc: Tympanic   SpO2: 98%   Weight: 85.2 kg (187 lb 14.4 oz)   Height: 183.8 cm (72.36\")   PainSc: 0-No pain     Current Status 9/30/2020   ECOG score 0       Physical Exam    CONSTITUTIONAL: pleasant well-developed adult man  HEENT: no icterus, no thrush, moist membranes  NECK: no jvd  LYMPH: no cervical or supraclavicular lad or axillary lad  CV: RRR, S1S2, no murmur  RESP: cta bilat, no wheezing, no rales  GI: soft, non-tender, no splenomegaly, +bs  MUSC: no edema, normal gait  NEURO: alert and oriented x3, normal strength  PSYCH: normal mood and affect  SKIN: skin lesions left chest below axilla, left thigh and RLE now flat and discolored    RECENT LABS:  Hematology WBC   Date Value Ref Range Status   09/23/2020 7.78 3.40 - 10.80 10*3/mm3 Final     RBC   Date Value Ref Range Status   09/23/2020 4.91 4.14 - 5.80 10*6/mm3 Final     Hemoglobin   Date Value Ref Range Status   09/23/2020 14.3 13.0 - 17.7 g/dL Final     Hematocrit   Date Value Ref Range Status   09/23/2020 42.7 37.5 - 51.0 % Final     Platelets   Date Value Ref Range Status   09/23/2020 241 140 - 450 10*3/mm3 Final        Lab Results   Component Value Date    GLUCOSE " 105 (H) 09/23/2020    BUN 12 09/23/2020    CREATININE 0.95 09/23/2020    EGFRIFNONA 83 09/23/2020    BCR 12.6 09/23/2020    K 4.3 09/23/2020    CO2 27.6 09/23/2020    CALCIUM 9.3 09/23/2020    ALBUMIN 4.60 09/23/2020    AST 20 09/23/2020    ALT 22 09/23/2020     PET scan 9/28/2020:  IMPRESSION:  1.  No findings of definite FDG avid disease within the neck, chest,  abdomen or pelvis. Tiny right cervical node demonstrating FDG uptake  mildly above that of blood pool which is favored to be reactive.  However, continued attention on follow-up with CT neck in 3 months is  recommended to ensure stability.  2.  Sub-6 mm pulmonary nodule in the left upper lobe is present and  below PET resolution. Attention on follow-up chest CT in 3 months is  recommended to ensure stability given patient history.  3.  Left nephrolithiasis without hydronephrosis.  4.  Other findings as above.    Assessment/Plan     1.  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  Differential diagnosis given by pathology for lymphomatoid papulosis, anaplastic T-cell lymphoma of the skin (rule out systemic), reactive process etc.  The patient is having no significant constitutional symptoms other than mild fatigue.  CBC is normal with no lymphocytosis.  Peripheral blood flow cytometry showed no abnormal lymphoid or for that matter myeloid populations.  Immunofixation is normal.  PET scan showed no clear evidence of hypermetabolic uptake.  There was nonspecific finding and a right cervical lymph node felt likely to be reactive.    2.  Familial history of pancreatic cancer affecting the patient's father and paternal grandmother and sister with early age of breast cancer.  I discussed with the patient this sounds concerning for possible genetic malignancy trait such as a BRCA mutation.  I recommended  the patient to be seen by genetics and he was agreeable-a referral was made    3.  Sub-6 mm left upper lobe nodule      At this time I see no evidence that the patient has systemic involvement of lymphoma.  I have ordered a repeat CBC in 3 months along with a CT of the neck and chest to follow-up the PET findings.

## 2020-11-21 ENCOUNTER — APPOINTMENT (OUTPATIENT)
Dept: GENERAL RADIOLOGY | Facility: HOSPITAL | Age: 55
End: 2020-11-21

## 2020-11-21 ENCOUNTER — APPOINTMENT (OUTPATIENT)
Dept: CARDIOLOGY | Facility: HOSPITAL | Age: 55
End: 2020-11-21

## 2020-11-21 ENCOUNTER — HOSPITAL ENCOUNTER (EMERGENCY)
Facility: HOSPITAL | Age: 55
Discharge: HOME | End: 2020-11-21
Attending: EMERGENCY MEDICINE | Admitting: EMERGENCY MEDICINE

## 2020-11-21 VITALS
RESPIRATION RATE: 18 BRPM | DIASTOLIC BLOOD PRESSURE: 79 MMHG | BODY MASS INDEX: 24.97 KG/M2 | WEIGHT: 184.38 LBS | HEART RATE: 76 BPM | HEIGHT: 72 IN | TEMPERATURE: 97.9 F | OXYGEN SATURATION: 98 % | SYSTOLIC BLOOD PRESSURE: 116 MMHG

## 2020-11-21 DIAGNOSIS — I48.91 ATRIAL FIBRILLATION WITH RVR (HCC): Primary | ICD-10-CM

## 2020-11-21 LAB
ALBUMIN SERPL-MCNC: 4.5 G/DL (ref 3.5–5.2)
ALBUMIN/GLOB SERPL: 1.6 G/DL
ALP SERPL-CCNC: 61 U/L (ref 39–117)
ALT SERPL W P-5'-P-CCNC: 21 U/L (ref 1–41)
ANION GAP SERPL CALCULATED.3IONS-SCNC: 9 MMOL/L (ref 5–15)
APTT PPP: 27.3 SECONDS (ref 24.3–38.1)
AST SERPL-CCNC: 21 U/L (ref 1–40)
BASOPHILS # BLD AUTO: 0.02 10*3/MM3 (ref 0–0.2)
BASOPHILS NFR BLD AUTO: 0.3 % (ref 0–1.5)
BILIRUB SERPL-MCNC: 0.6 MG/DL (ref 0–1.2)
BUN SERPL-MCNC: 15 MG/DL (ref 6–20)
BUN/CREAT SERPL: 14.2 (ref 7–25)
CALCIUM SPEC-SCNC: 10 MG/DL (ref 8.6–10.5)
CHLORIDE SERPL-SCNC: 104 MMOL/L (ref 98–107)
CO2 SERPL-SCNC: 28 MMOL/L (ref 22–29)
CREAT SERPL-MCNC: 1.06 MG/DL (ref 0.76–1.27)
DEPRECATED RDW RBC AUTO: 39.9 FL (ref 37–54)
EOSINOPHIL # BLD AUTO: 0.13 10*3/MM3 (ref 0–0.4)
EOSINOPHIL NFR BLD AUTO: 1.7 % (ref 0.3–6.2)
ERYTHROCYTE [DISTWIDTH] IN BLOOD BY AUTOMATED COUNT: 12.1 % (ref 12.3–15.4)
GFR SERPL CREATININE-BSD FRML MDRD: 73 ML/MIN/1.73
GLOBULIN UR ELPH-MCNC: 2.8 GM/DL
GLUCOSE SERPL-MCNC: 105 MG/DL (ref 65–99)
HCT VFR BLD AUTO: 44.1 % (ref 37.5–51)
HGB BLD-MCNC: 14.1 G/DL (ref 13–17.7)
IMM GRANULOCYTES # BLD AUTO: 0.02 10*3/MM3 (ref 0–0.05)
IMM GRANULOCYTES NFR BLD AUTO: 0.3 % (ref 0–0.5)
INR PPP: 1.03 (ref 0.9–1.1)
LYMPHOCYTES # BLD AUTO: 1.7 10*3/MM3 (ref 0.7–3.1)
LYMPHOCYTES NFR BLD AUTO: 22.7 % (ref 19.6–45.3)
MCH RBC QN AUTO: 28.5 PG (ref 26.6–33)
MCHC RBC AUTO-ENTMCNC: 32 G/DL (ref 31.5–35.7)
MCV RBC AUTO: 89.3 FL (ref 79–97)
MONOCYTES # BLD AUTO: 0.76 10*3/MM3 (ref 0.1–0.9)
MONOCYTES NFR BLD AUTO: 10.1 % (ref 5–12)
NEUTROPHILS NFR BLD AUTO: 4.87 10*3/MM3 (ref 1.7–7)
NEUTROPHILS NFR BLD AUTO: 64.9 % (ref 42.7–76)
NT-PROBNP SERPL-MCNC: 427.4 PG/ML (ref 0–900)
PLATELET # BLD AUTO: 255 10*3/MM3 (ref 140–450)
PMV BLD AUTO: 11 FL (ref 6–12)
POTASSIUM SERPL-SCNC: 4.3 MMOL/L (ref 3.5–5.2)
PROT SERPL-MCNC: 7.3 G/DL (ref 6–8.5)
PROTHROMBIN TIME: 13.2 SECONDS (ref 12.1–15)
QT INTERVAL: 361 MS
RBC # BLD AUTO: 4.94 10*6/MM3 (ref 4.14–5.8)
SODIUM SERPL-SCNC: 141 MMOL/L (ref 136–145)
TROPONIN T SERPL-MCNC: <0.01 NG/ML (ref 0–0.03)
WBC # BLD AUTO: 7.5 10*3/MM3 (ref 3.4–10.8)

## 2020-11-21 PROCEDURE — 96374 THER/PROPH/DIAG INJ IV PUSH: CPT

## 2020-11-21 PROCEDURE — 99284 EMERGENCY DEPT VISIT MOD MDM: CPT

## 2020-11-21 PROCEDURE — 93005 ELECTROCARDIOGRAM TRACING: CPT | Performed by: EMERGENCY MEDICINE

## 2020-11-21 PROCEDURE — 99283 EMERGENCY DEPT VISIT LOW MDM: CPT | Performed by: EMERGENCY MEDICINE

## 2020-11-21 PROCEDURE — 83880 ASSAY OF NATRIURETIC PEPTIDE: CPT | Performed by: EMERGENCY MEDICINE

## 2020-11-21 PROCEDURE — 84484 ASSAY OF TROPONIN QUANT: CPT | Performed by: EMERGENCY MEDICINE

## 2020-11-21 PROCEDURE — 85025 COMPLETE CBC W/AUTO DIFF WBC: CPT | Performed by: EMERGENCY MEDICINE

## 2020-11-21 PROCEDURE — 71045 X-RAY EXAM CHEST 1 VIEW: CPT

## 2020-11-21 PROCEDURE — 93225 XTRNL ECG REC<48 HRS REC: CPT

## 2020-11-21 PROCEDURE — 85610 PROTHROMBIN TIME: CPT | Performed by: EMERGENCY MEDICINE

## 2020-11-21 PROCEDURE — 93226 XTRNL ECG REC<48 HR SCAN A/R: CPT

## 2020-11-21 PROCEDURE — 80053 COMPREHEN METABOLIC PANEL: CPT | Performed by: EMERGENCY MEDICINE

## 2020-11-21 PROCEDURE — 85730 THROMBOPLASTIN TIME PARTIAL: CPT | Performed by: EMERGENCY MEDICINE

## 2020-11-21 PROCEDURE — 93010 ELECTROCARDIOGRAM REPORT: CPT | Performed by: INTERNAL MEDICINE

## 2020-11-21 RX ORDER — DILTIAZEM HYDROCHLORIDE 5 MG/ML
10 INJECTION INTRAVENOUS ONCE
Status: COMPLETED | OUTPATIENT
Start: 2020-11-21 | End: 2020-11-21

## 2020-11-21 RX ORDER — SODIUM CHLORIDE 0.9 % (FLUSH) 0.9 %
10 SYRINGE (ML) INJECTION AS NEEDED
Status: DISCONTINUED | OUTPATIENT
Start: 2020-11-21 | End: 2020-11-21 | Stop reason: HOSPADM

## 2020-11-21 RX ADMIN — DILTIAZEM HYDROCHLORIDE 10 MG: 5 INJECTION INTRAVENOUS at 09:52

## 2020-11-21 RX ADMIN — METOPROLOL TARTRATE 25 MG: 25 TABLET, FILM COATED ORAL at 11:42

## 2020-11-21 NOTE — ED NOTES
Second call placed to [facility]. Answering service will page on call  For second time     Kalpana Martinez  11/21/20 [id number]

## 2020-11-21 NOTE — DISCHARGE INSTRUCTIONS
Medication as directed.  Wear Holter monitor for 24 hours and return to cardiology office.  Follow-up with cardiology as above.  Return to ED for worsening symptoms, medical emergencies.

## 2020-11-21 NOTE — ED NOTES
Call placed [facility], answering service will page on call Kalpana Yeung  11/21/20 [phone]

## 2020-11-21 NOTE — ED PROVIDER NOTES
"Lynn Allen is a 56 yo WM resents secondary to palpitations and chest pain.  Patient reports 8 PM last night he \"felt my heart rhythm change\".  Patient still feels heart is out of rhythm.  He also feels a heaviness in his left chest that occurs intermittently and sporadically.  It is brief in nature.  Lasting no more than a minute or 2.  Patient reports he felt lightheaded and dizzy with weak legs on standing.  Patient denies recent illness or injury.  He presents for evaluation.    Cardiac history significant for coronary artery disease with MI.  Status post stent placement.  Hyperlipidemia.  Left bundle branch block patient is under the care of Dr. Paula Hall.      History provided by:  Patient      Review of Systems   Constitutional: Negative for fever.   HENT: Negative for rhinorrhea.    Eyes: Negative for redness.   Respiratory: Positive for shortness of breath. Negative for cough.    Cardiovascular: Positive for chest pain and palpitations.   Gastrointestinal: Negative for abdominal pain.   Genitourinary: Negative for dysuria.   Musculoskeletal: Negative for back pain.   Skin: Negative for rash.   Neurological: Positive for dizziness, weakness (in legs on standing) and light-headedness. Negative for syncope.   All other systems reviewed and are negative.      Past Medical History:   Diagnosis Date   • Coronary artery disease    • Hyperlipidemia    • Left bundle branch block        No Known Allergies    Past Surgical History:   Procedure Laterality Date   • ADENOIDECTOMY     • CARDIAC CATHETERIZATION N/A 8/10/2017    by Eneida Black MD; Left main normal, LAD scattered 10% proximal stenosis, mid LAD 95% concentric stenosis near focal area of calcification, left circumflex normal, first obtuse marginal branch proximal 10% stenosis, and right coronary artery with a 10% proximal and a 30% mid stenosis   • CARDIAC CATHETERIZATION N/A 8/10/2017    by Eneida Black MD; Chelsea Rawls " drug-eluting stent placement 3.5×18 mm to the mid LAD     • CORONARY ANGIOPLASTY WITH STENT PLACEMENT      LAD   • SHOULDER SURGERY Left        Family History   Problem Relation Age of Onset   • Cancer Father    • Heart attack Maternal Grandmother    • Cancer Paternal Grandmother        Social History     Socioeconomic History   • Marital status:      Spouse name: Not on file   • Number of children: Not on file   • Years of education: Not on file   • Highest education level: Not on file   Tobacco Use   • Smoking status: Never Smoker   • Smokeless tobacco: Never Used   • Tobacco comment: caffeine use   Substance and Sexual Activity   • Alcohol use: No   • Drug use: No   • Sexual activity: Defer           Objective   Physical Exam  Vitals signs and nursing note reviewed.   Constitutional:       General: He is in acute distress (Tachycardia with heart rate 150).      Appearance: He is well-developed. He is not ill-appearing, toxic-appearing or diaphoretic.      Comments: 55-year-old white male laying in bed.  Patient appears in good health for age.  Vital signs notable for heart rate in the 150s.  Blood pressure 98/84.  Otherwise unremarkable.  Patient is friendly and cooperative.  He does not appear in any discomfort.   HENT:      Head: Normocephalic and atraumatic.      Right Ear: External ear normal.      Left Ear: External ear normal.      Nose: Nose normal.   Eyes:      Conjunctiva/sclera: Conjunctivae normal.      Pupils: Pupils are equal, round, and reactive to light.   Neck:      Musculoskeletal: Normal range of motion and neck supple.   Cardiovascular:      Rate and Rhythm: Tachycardia present. Rhythm irregularly irregular.      Heart sounds: Normal heart sounds. No murmur. No friction rub. No gallop.    Pulmonary:      Effort: Pulmonary effort is normal. No respiratory distress.      Breath sounds: Normal breath sounds. No stridor. No wheezing or rales.   Abdominal:      General: There is no  distension.      Palpations: Abdomen is soft.      Tenderness: There is no abdominal tenderness.   Musculoskeletal: Normal range of motion.   Skin:     General: Skin is warm and dry.      Findings: No erythema or rash.   Neurological:      Mental Status: He is alert and oriented to person, place, and time.      Cranial Nerves: No cranial nerve deficit.      Deep Tendon Reflexes: Reflexes are normal and symmetric.   Psychiatric:         Behavior: Behavior normal.         Procedures     EKG 12-lead  Date 11/21/2020  Time 9: 40 1 AM  Atrial fibrillation with RVR  Leftward axis  Left bundle branch block present  ST segment elevation associated with LBBB  Abnormal EKG    Atrial fibrillation new as compared to EKG dated 12/16/2019      ED Course  ED Course as of Nov 21 1135   Sat Nov 21, 2020   0955 EKG shows patient in A. fib with RVR.  However heart rate in the 120s.  Giving Cardizem 10 mg IV.  Obtaining cardiac work-up.    [SS]   0955 Patient responded well to Cardizem.  Heart rate now in the 70s.    [SS]   1018 CBC and CMP unremarkable.  INR normal at 1.03.    [SS]   1021 Patient feeling better.    [SS]   1054 Chest x-ray shows mild cardiac enlargement.  Otherwise unremarkable.  Calling cardiology for consultation.    [SS]   1126 Discussed this case at length with  -cardiology.  He recommends starting patient on metoprolol 25 mg twice daily.  Placing a Holter monitor.  Patient to follow-up in cardiology this coming week or the following.  Discussed at length with patient all results, diagnoses, treatment plan and follow-up.  Patient acknowledges understanding.  Patient 24-hour Holter and given first dose of metoprolol here in the emergency room.  Will DC home.Prescriptions1 - metoprolol 25 mg twice daily    [SS]      ED Course User Index  [SS] Salomón Iverson MD      Labs Reviewed   COMPREHENSIVE METABOLIC PANEL - Abnormal; Notable for the following components:       Result Value    Glucose 105 (*)      All other components within normal limits    Narrative:     GFR Normal >60  Chronic Kidney Disease <60  Kidney Failure <15     CBC WITH AUTO DIFFERENTIAL - Abnormal; Notable for the following components:    RDW 12.1 (*)     All other components within normal limits   PROTIME-INR - Normal    Narrative:     Therapeutic Ranges for INR: 2.0-3.0 (PT 20-30)                              2.5-3.5 (PT 25-34)   APTT - Normal    Narrative:     PTT = The equivalent PTT values for the therapeutic range of heparin levels at 0.1 to 0.7 U/ml are 53 to 110 seconds.     BNP (IN-HOUSE) - Normal    Narrative:     Among patients with dyspnea, NT-proBNP is highly sensitive for the detection of acute congestive heart failure. In addition NT-proBNP of <300 pg/ml effectively rules out acute congestive heart failure with 99% negative predictive value.    Results may be falsely decreased if patient taking Biotin.     TROPONIN (IN-HOUSE) - Normal    Narrative:     Troponin T Reference Range:  <= 0.03 ng/mL-   Negative for AMI  >0.03 ng/mL-     Abnormal for myocardial necrosis.  Clinicians would have to utilize clinical acumen, EKG, Troponin and serial changes to determine if it is an Acute Myocardial Infarction or myocardial injury due to an underlying chronic condition.       Results may be falsely decreased if patient taking Biotin.     CBC AND DIFFERENTIAL    Narrative:     The following orders were created for panel order CBC & Differential.  Procedure                               Abnormality         Status                     ---------                               -----------         ------                     CBC Auto Differential[[id number]]        Abnormal            Final result                 Please view results for these tests on the individual orders.     Xr Chest 1 View    Result Date: 11/21/2020  Narrative: CR Chest 1 Vw INDICATION: Palpitations and chest pain transient today. COMPARISON:  None available. FINDINGS: 2 portable AP  view(s) of the chest.  Cardiac silhouette is mildly enlarged. There is no pleural effusion or pneumothorax. There is no acute infiltrate or acute congestive failure.     Impression: Mild cardiac silhouette enlargement. Signer Name: Swathi Sewell MD  Signed: 11/21/2020 10:29 AM  Workstation Name: [email or web address]  [facility]    My differential diagnosis for palpitations includes but is not limited to sinus tachycardia, SVT, PACs, atrial fibrillation, atrial flutter, PVCs, V. fib, V. tach, ACS, toxins, drug abuse, electrolyte abnormalities and anxiety attacks.    My differential diagnosis for chest pain includes but is not limited to:  Muscle strain, costochondritis, myositis, pleurisy, rib fracture, intercostal neuritis, herpes zoster, tumor, myocardial infarction, coronary syndrome, unstable angina, angina, aortic dissection, mitral valve prolapse, pericarditis, palpitations, pulmonary embolus, pneumonia, pneumothorax, lung cancer, GERD, esophagitis, esophageal spasm                                       MDM    Final diagnoses:   Atrial fibrillation with RVR (CMS/HCC)            Salomón Iverson MD  11/21/20 [id number]

## 2020-11-24 ENCOUNTER — TELEPHONE (OUTPATIENT)
Dept: CARDIOLOGY | Facility: CLINIC | Age: 55
End: 2020-11-24

## 2020-11-24 DIAGNOSIS — I25.10 CORONARY ARTERY DISEASE INVOLVING NATIVE CORONARY ARTERY OF NATIVE HEART WITHOUT ANGINA PECTORIS: Primary | ICD-10-CM

## 2020-11-24 RX ORDER — ATORVASTATIN CALCIUM 20 MG/1
20 TABLET, FILM COATED ORAL DAILY
Qty: 90 TABLET | Refills: 3 | Status: SHIPPED | OUTPATIENT
Start: 2020-11-24 | End: 2021-11-16 | Stop reason: SDUPTHER

## 2020-11-24 NOTE — TELEPHONE ENCOUNTER
Dr. Nuñez office has not seen pt since 9/2019    Called pt but had to leave a message for him to call back

## 2020-11-25 PROCEDURE — 93227 XTRNL ECG REC<48 HR R&I: CPT | Performed by: INTERNAL MEDICINE

## 2020-11-30 ENCOUNTER — LAB (OUTPATIENT)
Dept: LAB | Facility: HOSPITAL | Age: 55
End: 2020-11-30

## 2020-11-30 ENCOUNTER — TELEPHONE (OUTPATIENT)
Dept: CARDIOLOGY | Facility: CLINIC | Age: 55
End: 2020-11-30

## 2020-11-30 DIAGNOSIS — I25.10 CORONARY ARTERY DISEASE INVOLVING NATIVE CORONARY ARTERY OF NATIVE HEART WITHOUT ANGINA PECTORIS: ICD-10-CM

## 2020-11-30 LAB
CHOLEST SERPL-MCNC: 154 MG/DL (ref 0–200)
HDLC SERPL-MCNC: 50 MG/DL (ref 40–60)
LDLC SERPL CALC-MCNC: 86 MG/DL (ref 0–100)
LDLC/HDLC SERPL: 1.68 {RATIO}
TRIGL SERPL-MCNC: 99 MG/DL (ref 0–150)
VLDLC SERPL-MCNC: 18 MG/DL (ref 5–40)

## 2020-11-30 PROCEDURE — 80061 LIPID PANEL: CPT

## 2020-11-30 PROCEDURE — 36415 COLL VENOUS BLD VENIPUNCTURE: CPT

## 2020-11-30 NOTE — TELEPHONE ENCOUNTER
Notified patient of results. He verbalized understanding.    Lola Tobar, RN  Triage [facility]

## 2020-12-02 ENCOUNTER — OFFICE VISIT (OUTPATIENT)
Dept: CARDIOLOGY | Facility: CLINIC | Age: 55
End: 2020-12-02

## 2020-12-02 VITALS
DIASTOLIC BLOOD PRESSURE: 82 MMHG | SYSTOLIC BLOOD PRESSURE: 128 MMHG | WEIGHT: 187 LBS | HEIGHT: 71 IN | HEART RATE: 55 BPM | OXYGEN SATURATION: 97 % | BODY MASS INDEX: 26.18 KG/M2 | RESPIRATION RATE: 16 BRPM

## 2020-12-02 DIAGNOSIS — I25.10 CORONARY ARTERY DISEASE INVOLVING NATIVE CORONARY ARTERY OF NATIVE HEART WITHOUT ANGINA PECTORIS: Primary | ICD-10-CM

## 2020-12-02 DIAGNOSIS — E78.5 HYPERLIPIDEMIA, UNSPECIFIED HYPERLIPIDEMIA TYPE: ICD-10-CM

## 2020-12-02 DIAGNOSIS — I44.7 LBBB (LEFT BUNDLE BRANCH BLOCK): ICD-10-CM

## 2020-12-02 DIAGNOSIS — I48.0 PAROXYSMAL ATRIAL FIBRILLATION (HCC): ICD-10-CM

## 2020-12-02 PROCEDURE — 99214 OFFICE O/P EST MOD 30 MIN: CPT | Performed by: NURSE PRACTITIONER

## 2020-12-02 PROCEDURE — 93000 ELECTROCARDIOGRAM COMPLETE: CPT | Performed by: NURSE PRACTITIONER

## 2020-12-02 NOTE — PROGRESS NOTES
"  Date of Office Visit: 2020  Encounter Provider: LISA Echeverria  Place of Service: [facility]  Patient Name: Abram Allen  :1965  Primary Cardiologist: Dr. Hall    CC:  1-2 week hospital follow up    Dear Dr. Hastings    HPI: Abram Allen is a pleasant 55 y.o. male who presents 2020 for cardiac follow up. He is a new patient to me and I have reviewed his past medical records.  He is a patient of Dr. Hall. Cardiac history significant for coronary artery disease with MI, s/p stent placement, hyperlipidemia and Left bundle branch block.     Abram Allen came in for an evaluation 2017.  He was having chest heaviness with running.  He has been a lifelong runner. He had a treadmill study done because of the chest pain.  This was done 8/3/2017 showed left bundle branch block with repolarization changes during exercise.  Because of his complaints and the ECG changes, he was sent for a heart catheterization that was performed on  8/10/2017.  This showed normal left main, 10% proximal LAD, 95% mid LAD, normal left circumflex, 10% first obtuse marginal stenosis, 10% proximal RCA stenosis and 30% mid vessel stenosis.  He received a Xience Alpine drug-eluting stent, 3.5 x 18 mm the mid left anterior descending artery.  He had a treadmill stress study which was done 2017.  This showed normal heart rate response and blood pressure response exercise and good exercise tolerance.  He completed cardiac rehabilitation.     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     He presented to the ED on 2020 secondary to palpitations and chest pain.  Patient reports 8 PM the prior night,  he \"felt my heart rhythm change\".  Patient still feels heart is out of rhythm.  He also felt a heaviness in his left chest that occurred  intermittently and sporadically.  It was brief in nature and lasted no more than a minute or 2.  He " stated  he felt lightheaded and dizzy with weak legs on standing.  His EKG showed afib with RVR.  His labs were unremarkable, troponin negative. CXR showed Mild cardiac silhouette enlargement.  He was given a Cardizem bolus and converted to NSR 70s.  He was started on metoprolol 25 mg BID and discharged home with a Holter monitor.    Holter monitor 11/2020  · An abnormal monitor study.  · Atrial fibrillation is present throughout with episodes of rapid ventricular response  · Average heart rate 90 bpm  · Range of heart rate  bpm, no pauses seen     Lipid panel on 11/30/2020 revealed total cholesterol 154, triglycerides 99, HDL 50, LDL 86.     He presents today in follow-up.  We did discuss the results of his Holter monitor and the need to start anticoagulation.  He states when thinking back and looking back over the last couple years he has had multiple episodes of just quick increased heart rate that would make him slow down, catch his breath and then be able to continue.  He states now that feeling the ongoing A. fib, that when he has these little episodes he feels it was most likely atrial fibrillation.  He is now back in sinus rhythm and states he could definitely feel when he converted last Wednesday.  Since then he has had one little episode of that increased heart rate.  He states it really only last a couple of seconds and then it goes away.  Other than that he denies any shortness of breath, edema, dizziness or lightheadedness.  He is not had any chest pain or chest pressure.  He denies any fatigue.  His blood pressure is well controlled.  He denies any unexplained bleeding, dark or tarry stools.      Past Medical History:   Diagnosis Date   • Coronary artery disease    • Hyperlipidemia    • Left bundle branch block        Past Surgical History:   Procedure Laterality Date   • ADENOIDECTOMY     • CARDIAC CATHETERIZATION N/A 8/10/2017    by Eneida Black MD; Left main normal, LAD scattered 10%  proximal stenosis, mid LAD 95% concentric stenosis near focal area of calcification, left circumflex normal, first obtuse marginal branch proximal 10% stenosis, and right coronary artery with a 10% proximal and a 30% mid stenosis   • CARDIAC CATHETERIZATION N/A 8/10/2017    by Eneida Black MD; Xience Alpine drug-eluting stent placement 3.5×18 mm to the mid LAD     • CORONARY ANGIOPLASTY WITH STENT PLACEMENT      LAD   • SHOULDER SURGERY Left        Social History     Socioeconomic History   • Marital status:      Spouse name: Not on file   • Number of children: Not on file   • Years of education: Not on file   • Highest education level: Not on file   Tobacco Use   • Smoking status: Never Smoker   • Smokeless tobacco: Never Used   • Tobacco comment: caffeine use   Substance and Sexual Activity   • Alcohol use: No   • Drug use: No   • Sexual activity: Defer       Family History   Problem Relation Age of Onset   • Cancer Father    • Heart attack Maternal Grandmother    • Cancer Paternal Grandmother        The following portion of the patient's history were reviewed and updated as appropriate: past medical history, past surgical history, past social history, past family history, allergies, current medications, and problem list.    Review of Systems   Constitution: Negative for diaphoresis, fever and malaise/fatigue.   HENT: Negative for congestion, hearing loss, hoarse voice, nosebleeds and sore throat.    Eyes: Negative for photophobia, vision loss in left eye, vision loss in right eye and visual disturbance.   Cardiovascular: Positive for palpitations. Negative for chest pain, dyspnea on exertion, irregular heartbeat, leg swelling, near-syncope, orthopnea, paroxysmal nocturnal dyspnea and syncope.   Respiratory: Negative for cough, hemoptysis, shortness of breath, sleep disturbances due to breathing, snoring, sputum production and wheezing.    Endocrine: Negative for cold intolerance, heat intolerance,  polydipsia, polyphagia and polyuria.   Hematologic/Lymphatic: Negative for bleeding problem. Does not bruise/bleed easily.   Skin: Negative for color change, dry skin, poor wound healing, rash and suspicious lesions.   Musculoskeletal: Negative for arthritis, back pain, falls, gout, joint pain, joint swelling, muscle cramps, muscle weakness and myalgias.   Gastrointestinal: Negative for bloating, abdominal pain, constipation, diarrhea, dysphagia, melena, nausea and vomiting.   Neurological: Negative for excessive daytime sleepiness, dizziness, headaches, light-headedness, loss of balance, numbness, paresthesias, seizures, vertigo and weakness.   Psychiatric/Behavioral: Negative for depression, memory loss and substance abuse. The patient is not nervous/anxious.        No Known Allergies      Current Outpatient Medications:   •  aspirin 325 MG tablet, Take 1 tablet by mouth Daily., Disp: 30 tablet, Rfl: 11  •  atorvastatin (LIPITOR) 20 MG tablet, TAKE ONE TABLET BY MOUTH DAILY, Disp: 90 tablet, Rfl: 3  •  metoprolol tartrate (LOPRESSOR) 25 MG tablet, Take 1 tablet by mouth 2 (Two) Times a Day for 30 days., Disp: 60 tablet, Rfl: 0        Objective:     There were no vitals filed for this visit.  There is no height or weight on file to calculate BMI.      Vitals signs reviewed.   Constitutional:       General: Not in acute distress.     Appearance: Normal and healthy appearance. Well-developed, well-groomed, normal weight and not in distress.   Eyes:      General:         Right eye: No discharge.         Left eye: No discharge.      Conjunctiva/sclera: Conjunctivae normal.   HENT:      Head: Normocephalic and atraumatic.      Right Ear: External ear normal.      Left Ear: External ear normal.      Nose: Nose normal.   Neck:      Musculoskeletal: Normal range of motion and neck supple.      Thyroid: No thyromegaly.      Vascular: No JVD.      Trachea: No tracheal deviation.      Lymphadenopathy: No cervical adenopathy.    Pulmonary:      Effort: Pulmonary effort is normal. No respiratory distress.      Breath sounds: Normal breath sounds. No wheezing. No rales.   Chest:      Chest wall: Not tender to palpatation.   Cardiovascular:      Normal rate. Regular rhythm.      No gallop.   Pulses:     Intact distal pulses.   Abdominal:      General: There is no distension.      Palpations: Abdomen is soft.      Tenderness: There is no abdominal tenderness.   Musculoskeletal: Normal range of motion.         General: No tenderness or deformity.   Skin:     General: Skin is warm and dry.      Findings: No erythema or rash.   Neurological:      Mental Status: Alert and oriented to person, place, and time.      Coordination: Coordination normal.   Psychiatric:         Behavior: Behavior normal. Behavior is cooperative.         Thought Content: Thought content normal.         Judgment: Judgment normal.               ECG 12 Lead    Date/Time: 12/2/2020 11:34 AM  Performed by: Tanisha Peña APRN  Authorized by: Tanisha Peña APRN   Comparison: compared with previous ECG from 11/21/2020  Rhythm: sinus rhythm  Rate: normal  Conduction: conduction normal  ST Segments: ST segments normal  T Waves: T waves normal  QRS axis: left    Clinical impression: abnormal EKG              Assessment:       Diagnosis Plan   1. Coronary artery disease involving native coronary artery of native heart without angina pectoris     2. Hyperlipidemia, unspecified hyperlipidemia type     3. LBBB (left bundle branch block)     4. Paroxysmal atrial fibrillation (CMS/HCC)            Plan:     1.CAD, s/p LAD stent 8/10/17, with presentation of unstable angina.  Denies angina     2.Hyperlipidemia- continue lipid lowering therapy, currently on  Lipitor     3. LBBB on ECG.     4.High stress job, has made changes.     5.  Atrial fib - newly diagnosed with ER visit 11/13/2020.  Now back in NSR.  Will continue metoprolol and start Eliquis 5 mg BID.  Stop 325 ASA.  Check  Echo.    Keep appt with RM in 2 weeks (already scheduled)    As always, it has been a pleasure to participate in your patient's care. Thank you.       Sincerely,       LISA Echeverria      Current Outpatient Medications:   •  atorvastatin (LIPITOR) 20 MG tablet, TAKE ONE TABLET BY MOUTH DAILY, Disp: 90 tablet, Rfl: 3  •  metoprolol tartrate (LOPRESSOR) 25 MG tablet, Take 1 tablet by mouth 2 (Two) Times a Day for 30 days., Disp: 180 tablet, Rfl: 3  •  apixaban (ELIQUIS) 5 MG tablet tablet, Take 1 tablet by mouth Every 12 (Twelve) Hours., Disp: 180 tablet, Rfl: 3    Dictated utilizing Dragon dictation

## 2020-12-04 ENCOUNTER — EPISODE CHANGES (OUTPATIENT)
Dept: CASE MANAGEMENT | Facility: OTHER | Age: 55
End: 2020-12-04

## 2020-12-14 ENCOUNTER — LAB (OUTPATIENT)
Dept: LAB | Facility: HOSPITAL | Age: 55
End: 2020-12-14

## 2020-12-14 ENCOUNTER — OFFICE VISIT (OUTPATIENT)
Dept: CARDIOLOGY | Facility: CLINIC | Age: 55
End: 2020-12-14

## 2020-12-14 VITALS
DIASTOLIC BLOOD PRESSURE: 74 MMHG | WEIGHT: 185.3 LBS | HEART RATE: 55 BPM | HEIGHT: 71 IN | BODY MASS INDEX: 25.94 KG/M2 | SYSTOLIC BLOOD PRESSURE: 132 MMHG

## 2020-12-14 DIAGNOSIS — I48.0 PAROXYSMAL ATRIAL FIBRILLATION (HCC): ICD-10-CM

## 2020-12-14 DIAGNOSIS — E78.2 MIXED HYPERLIPIDEMIA: ICD-10-CM

## 2020-12-14 DIAGNOSIS — Z95.5 S/P CORONARY ARTERY STENT PLACEMENT: ICD-10-CM

## 2020-12-14 DIAGNOSIS — I25.10 CORONARY ARTERY DISEASE INVOLVING NATIVE CORONARY ARTERY OF NATIVE HEART WITHOUT ANGINA PECTORIS: ICD-10-CM

## 2020-12-14 DIAGNOSIS — I48.0 AF (PAROXYSMAL ATRIAL FIBRILLATION) (HCC): ICD-10-CM

## 2020-12-14 DIAGNOSIS — I25.10 CORONARY ARTERY DISEASE INVOLVING NATIVE CORONARY ARTERY OF NATIVE HEART WITHOUT ANGINA PECTORIS: Primary | ICD-10-CM

## 2020-12-14 DIAGNOSIS — I44.7 LBBB (LEFT BUNDLE BRANCH BLOCK): ICD-10-CM

## 2020-12-14 LAB — TSH SERPL DL<=0.05 MIU/L-ACNC: 1.81 UIU/ML (ref 0.27–4.2)

## 2020-12-14 PROCEDURE — 36415 COLL VENOUS BLD VENIPUNCTURE: CPT

## 2020-12-14 PROCEDURE — 84443 ASSAY THYROID STIM HORMONE: CPT

## 2020-12-14 PROCEDURE — 93000 ELECTROCARDIOGRAM COMPLETE: CPT | Performed by: INTERNAL MEDICINE

## 2020-12-14 PROCEDURE — 99214 OFFICE O/P EST MOD 30 MIN: CPT | Performed by: INTERNAL MEDICINE

## 2020-12-14 NOTE — PROGRESS NOTES
Date of Office Visit: 2020  Encounter Provider: Paula Hall MD  Place of Service: [facility]  Patient Name: Abram Allen  :1965      Patient ID:  Abram Allen is a 55 y.o. male is here for  followup for CAD.         History of Present Illness    Abram Allen came in for an evaluation 2017.  He was having chest heaviness with running.  He has been a lifelong runner. He had a treadmill study done because of the chest pain.  This was done 8/3/2017 showed left bundle branch block with repolarization changes during exercise.  Because of his complaints and the ECG changes, I recommended that he undergo heart catheterization.  This was done 8/10/2017.  This showed normal left main, 10% proximal LAD, 95% mid LAD, normal left circumflex, 10% first obtuse marginal stenosis, 10% proximal RCA stenosis and 30% mid vessel stenosis.  He received a Xience Alpine drug-eluting stent, 3.5 x 18 mm the mid left anterior descending artery.  He had a treadmill stress study which was done 2017.  This showed normal heart rate response and blood pressure response exercise and good exercise tolerance.  He completed cardiac rehabilitation.     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.    He was in the emergency department due to palpitations and chest pain on 2020.  He was found to be in atrial fibrillation with rapid ventricular response.  He would with a dose of IV diltiazem.  He was started on metoprolol 25 mg twice daily and a Holter was placed.  Was released from the emergency department.  His labs on that day show negative troponin, normal proBNP, glucose 105, otherwise normal CMP, normal CBC.  Bedside 2020 show LDL 86, HDL 50, total cholesterol 154.  Monitor done 2020 was abnormal showing atrial fibrillation throughout the monitor with an average heart rate of 90.  Today he is in normal sinus rhythm.  He is on  apixaban.  He said that the atrial fibrillation lasted 6 days, starting on the Friday before Thanksgiving and stopping the Wednesday before Thanksgiving.  He did say that when his heart rate was fast, he was having chest tightness.  That is gone away as well.  He had no dizziness or syncope.  He is taking his medications as directed.  He not know what started the atrial fibrillation.  He has no history of sleep apnea or valvular heart disease.  He is not recently had his thyroid checked.  He has no orthopnea or PND.  He walks 40 to 60 minutes daily for exercise.       Past Medical History:   Diagnosis Date   • Coronary artery disease    • Hyperlipidemia    • Left bundle branch block          Past Surgical History:   Procedure Laterality Date   • ADENOIDECTOMY     • CARDIAC CATHETERIZATION N/A 8/10/2017    by Eneida Black MD; Left main normal, LAD scattered 10% proximal stenosis, mid LAD 95% concentric stenosis near focal area of calcification, left circumflex normal, first obtuse marginal branch proximal 10% stenosis, and right coronary artery with a 10% proximal and a 30% mid stenosis   • CARDIAC CATHETERIZATION N/A 8/10/2017    by Eneida Black MD; Xience Alpine drug-eluting stent placement 3.5×18 mm to the mid LAD     • CORONARY ANGIOPLASTY WITH STENT PLACEMENT      LAD   • SHOULDER SURGERY Left        Current Outpatient Medications on File Prior to Visit   Medication Sig Dispense Refill   • apixaban (ELIQUIS) 5 MG tablet tablet Take 1 tablet by mouth Every 12 (Twelve) Hours. 180 tablet 3   • atorvastatin (LIPITOR) 20 MG tablet TAKE ONE TABLET BY MOUTH DAILY 90 tablet 3   • metoprolol tartrate (LOPRESSOR) 25 MG tablet Take 1 tablet by mouth 2 (Two) Times a Day. 180 tablet 3     No current facility-administered medications on file prior to visit.        Social History     Socioeconomic History   • Marital status:      Spouse name: Not on file   • Number of children: Not on file   • Years of education:  "Not on file   • Highest education level: Not on file   Tobacco Use   • Smoking status: Never Smoker   • Smokeless tobacco: Never Used   Substance and Sexual Activity   • Alcohol use: No     Comment: caffeine use: 5-6 cups daily   • Drug use: No   • Sexual activity: Defer           Review of Systems   Constitution: Negative.   HENT: Negative for congestion.    Eyes: Negative for vision loss in left eye and vision loss in right eye.   Respiratory: Negative.  Negative for cough, hemoptysis, shortness of breath, sleep disturbances due to breathing, snoring, sputum production and wheezing.    Endocrine: Negative.    Hematologic/Lymphatic: Negative.    Skin: Negative for poor wound healing and rash.   Musculoskeletal: Negative for falls, gout, muscle cramps and myalgias.   Gastrointestinal: Negative for abdominal pain, diarrhea, dysphagia, hematemesis, melena, nausea and vomiting.   Neurological: Negative for excessive daytime sleepiness, dizziness, headaches, light-headedness, loss of balance, seizures and vertigo.   Psychiatric/Behavioral: Negative for depression and substance abuse. The patient is not nervous/anxious.        Procedures    ECG 12 Lead    Date/Time: 12/14/2020 11:37 AM  Performed by: Paula Hall MD  Authorized by: Paula Hall MD   Comparison: compared with previous ECG   Similar to previous ECG  Rhythm: sinus rhythm  Conduction: left bundle branch block    Clinical impression: abnormal EKG                Objective:      Vitals:    12/14/20 1121   BP: 132/74   BP Location: Right arm   Pulse: 55   Weight: 84.1 kg (185 lb 4.8 oz)   Height: 180.3 cm (71\")     Body mass index is 25.84 kg/m².    Vitals signs reviewed.   Constitutional:       General: Not in acute distress.     Appearance: Well-developed. Not diaphoretic.   Eyes:      General: No scleral icterus.     Conjunctiva/sclera: Conjunctivae normal.   HENT:      Head: Normocephalic and atraumatic.   Neck:      Musculoskeletal: Neck " supple.      Thyroid: No thyromegaly.      Vascular: No carotid bruit or JVD.      Lymphadenopathy: No cervical adenopathy.   Pulmonary:      Effort: Pulmonary effort is normal. No respiratory distress.      Breath sounds: Normal breath sounds. No wheezing. No rhonchi. No rales.   Chest:      Chest wall: Not tender to palpatation.   Cardiovascular:      Normal rate. Regular rhythm.      Murmurs: There is no murmur.      No gallop.   Pulses:     Intact distal pulses.   Edema:     Peripheral edema absent.   Abdominal:      General: Bowel sounds are normal. There is no distension or abdominal bruit.      Palpations: Abdomen is soft. There is no abdominal mass.      Tenderness: There is no abdominal tenderness.   Musculoskeletal:         General: No deformity.      Extremities: No clubbing present.  Skin:     General: Skin is warm and dry. There is no cyanosis.      Coloration: Skin is not pale.      Findings: No rash.   Neurological:      Mental Status: Alert and oriented to person, place, and time.      Cranial Nerves: No cranial nerve deficit.   Psychiatric:         Judgment: Judgment normal.         Lab Review:       Assessment:      Diagnosis Plan   1. Coronary artery disease involving native coronary artery of native heart without angina pectoris  TSH    Stress Test With Myocardial Perfusion One Day   2. LBBB (left bundle branch block)     3. Paroxysmal atrial fibrillation (CMS/HCC)     4. Mixed hyperlipidemia     5. S/P coronary artery stent placement  TSH   6. AF (paroxysmal atrial fibrillation) (CMS/HCC)  TSH    Stress Test With Myocardial Perfusion One Day     1. CAD, s/p LAD stent 8/10/17, with presentation of unstable angina.  No further angina  2. Hyperlipidemia, treated.   3. LBBB on ECG.   4. High stress job, has made changes.  Walks for exercise.  5. Atrial fibrillation with RVR, lasting about 5 days, now back in normal sinus rhythm.  He did have anginal chest pain when his heart was racing.     Plan:        See back in 1 year, for now will maintain apixaban and metoprolol.

## 2020-12-15 ENCOUNTER — TELEPHONE (OUTPATIENT)
Dept: CARDIOLOGY | Facility: CLINIC | Age: 55
End: 2020-12-15

## 2020-12-15 ENCOUNTER — APPOINTMENT (OUTPATIENT)
Dept: CARDIOLOGY | Facility: HOSPITAL | Age: 55
End: 2020-12-15

## 2020-12-21 ENCOUNTER — TELEPHONE (OUTPATIENT)
Dept: ONCOLOGY | Facility: CLINIC | Age: 55
End: 2020-12-21

## 2020-12-21 NOTE — TELEPHONE ENCOUNTER
PT: SELF    PT REQUESTING THAT WHEN LINNEA GETS BACK INTO WORK THAT SHE CALL HIM PLEASE. PLEASE ADVISE    PT #: [phone]

## 2020-12-28 ENCOUNTER — TRANSCRIBE ORDERS (OUTPATIENT)
Dept: OBSTETRICS AND GYNECOLOGY | Facility: CLINIC | Age: 55
End: 2020-12-28

## 2020-12-28 ENCOUNTER — HOSPITAL ENCOUNTER (OUTPATIENT)
Dept: CT IMAGING | Facility: HOSPITAL | Age: 55
Discharge: HOME | End: 2020-12-28
Admitting: INTERNAL MEDICINE

## 2020-12-28 DIAGNOSIS — C86.6 CUTANEOUS CD30+ LYMPHOPROLIFERATIVE DISORDER (HCC): ICD-10-CM

## 2020-12-28 DIAGNOSIS — Z01.818 OTHER SPECIFIED PRE-OPERATIVE EXAMINATION: Primary | ICD-10-CM

## 2020-12-28 DIAGNOSIS — R91.1 LUNG NODULE: ICD-10-CM

## 2020-12-28 PROCEDURE — 25010000002 IOPAMIDOL 61 % SOLUTION: Performed by: INTERNAL MEDICINE

## 2020-12-28 PROCEDURE — 0 IOPAMIDOL PER 1 ML: Performed by: INTERNAL MEDICINE

## 2020-12-28 PROCEDURE — 70491 CT SOFT TISSUE NECK W/DYE: CPT

## 2020-12-28 PROCEDURE — 71260 CT THORAX DX C+: CPT

## 2020-12-28 RX ADMIN — IOPAMIDOL 100 ML: 755 INJECTION, SOLUTION INTRAVENOUS at 08:15

## 2020-12-28 RX ADMIN — IOPAMIDOL 50 ML: 612 INJECTION, SOLUTION INTRAVENOUS at 08:15

## 2020-12-29 ENCOUNTER — HOSPITAL ENCOUNTER (OUTPATIENT)
Dept: CARDIOLOGY | Facility: HOSPITAL | Age: 55
Discharge: HOME | End: 2020-12-29

## 2020-12-29 ENCOUNTER — IMMUNIZATION (OUTPATIENT)
Dept: VACCINE CLINIC | Facility: HOSPITAL | Age: 55
End: 2020-12-29

## 2020-12-29 VITALS — BODY MASS INDEX: 25.06 KG/M2 | HEIGHT: 72 IN | WEIGHT: 185 LBS

## 2020-12-29 DIAGNOSIS — I44.7 LBBB (LEFT BUNDLE BRANCH BLOCK): ICD-10-CM

## 2020-12-29 DIAGNOSIS — I48.0 PAROXYSMAL ATRIAL FIBRILLATION (HCC): ICD-10-CM

## 2020-12-29 LAB
AORTIC DIMENSIONLESS INDEX: 1 (DI)
BH CV ECHO MEAS - ACS: 2.4 CM
BH CV ECHO MEAS - AO MAX PG: 9 MMHG
BH CV ECHO MEAS - AO MEAN PG (FULL): 1 MMHG
BH CV ECHO MEAS - AO MEAN PG: 4 MMHG
BH CV ECHO MEAS - AO ROOT AREA (BSA CORRECTED): 1.7
BH CV ECHO MEAS - AO ROOT AREA: 9.6 CM^2
BH CV ECHO MEAS - AO ROOT DIAM: 3.5 CM
BH CV ECHO MEAS - AO V2 MAX: 152 CM/SEC
BH CV ECHO MEAS - AO V2 MEAN: 95.9 CM/SEC
BH CV ECHO MEAS - AO V2 VTI: 29.9 CM
BH CV ECHO MEAS - ASC AORTA: 2.6 CM
BH CV ECHO MEAS - AVA(I,A): 3.1 CM^2
BH CV ECHO MEAS - AVA(I,D): 3.1 CM^2
BH CV ECHO MEAS - BSA(HAYCOCK): 2.1 M^2
BH CV ECHO MEAS - BSA: 2.1 M^2
BH CV ECHO MEAS - BZI_BMI: 25.1 KILOGRAMS/M^2
BH CV ECHO MEAS - BZI_METRIC_HEIGHT: 182.9 CM
BH CV ECHO MEAS - BZI_METRIC_WEIGHT: 83.9 KG
BH CV ECHO MEAS - EDV(CUBED): 117.6 ML
BH CV ECHO MEAS - EDV(MOD-SP2): 92.6 ML
BH CV ECHO MEAS - EDV(MOD-SP4): 116 ML
BH CV ECHO MEAS - EDV(TEICH): 112.8 ML
BH CV ECHO MEAS - EF(CUBED): 80.7 %
BH CV ECHO MEAS - EF(MOD-BP): 63 %
BH CV ECHO MEAS - EF(MOD-SP2): 67.7 %
BH CV ECHO MEAS - EF(MOD-SP4): 58.8 %
BH CV ECHO MEAS - EF(TEICH): 73.1 %
BH CV ECHO MEAS - ESV(CUBED): 22.7 ML
BH CV ECHO MEAS - ESV(MOD-SP2): 29.9 ML
BH CV ECHO MEAS - ESV(MOD-SP4): 47.8 ML
BH CV ECHO MEAS - ESV(TEICH): 30.3 ML
BH CV ECHO MEAS - FS: 42.2 %
BH CV ECHO MEAS - IVS/LVPW: 0.69
BH CV ECHO MEAS - IVSD: 0.88 CM
BH CV ECHO MEAS - LAT PEAK E' VEL: 14.3 CM/SEC
BH CV ECHO MEAS - LV DIASTOLIC VOL/BSA (35-75): 56.3 ML/M^2
BH CV ECHO MEAS - LV MASS(C)D: 194.1 GRAMS
BH CV ECHO MEAS - LV MASS(C)DI: 94.2 GRAMS/M^2
BH CV ECHO MEAS - LV MAX PG: 7 MMHG
BH CV ECHO MEAS - LV MEAN PG: 3 MMHG
BH CV ECHO MEAS - LV SYSTOLIC VOL/BSA (12-30): 23.2 ML/M^2
BH CV ECHO MEAS - LV V1 MAX: 129 CM/SEC
BH CV ECHO MEAS - LV V1 MEAN: 85.9 CM/SEC
BH CV ECHO MEAS - LV V1 VTI: 26.6 CM
BH CV ECHO MEAS - LVIDD: 4.9 CM
BH CV ECHO MEAS - LVIDS: 2.8 CM
BH CV ECHO MEAS - LVLD AP2: 7.1 CM
BH CV ECHO MEAS - LVLD AP4: 7.8 CM
BH CV ECHO MEAS - LVLS AP2: 6.1 CM
BH CV ECHO MEAS - LVLS AP4: 6.8 CM
BH CV ECHO MEAS - LVOT AREA (M): 3.5 CM^2
BH CV ECHO MEAS - LVOT AREA: 3.5 CM^2
BH CV ECHO MEAS - LVOT DIAM: 2.1 CM
BH CV ECHO MEAS - LVPWD: 1.3 CM
BH CV ECHO MEAS - MED PEAK E' VEL: 9.6 CM/SEC
BH CV ECHO MEAS - MV A DUR: 0.12 SEC
BH CV ECHO MEAS - MV A MAX VEL: 66.5 CM/SEC
BH CV ECHO MEAS - MV DEC SLOPE: 543 CM/SEC^2
BH CV ECHO MEAS - MV DEC TIME: 0.22 SEC
BH CV ECHO MEAS - MV E MAX VEL: 62.1 CM/SEC
BH CV ECHO MEAS - MV E/A: 0.93
BH CV ECHO MEAS - MV MEAN PG: 1 MMHG
BH CV ECHO MEAS - MV P1/2T MAX VEL: 84.2 CM/SEC
BH CV ECHO MEAS - MV P1/2T: 45.4 MSEC
BH CV ECHO MEAS - MV V2 MEAN: 47.1 CM/SEC
BH CV ECHO MEAS - MV V2 VTI: 22.3 CM
BH CV ECHO MEAS - MVA P1/2T LCG: 2.6 CM^2
BH CV ECHO MEAS - MVA(P1/2T): 4.8 CM^2
BH CV ECHO MEAS - MVA(VTI): 4.1 CM^2
BH CV ECHO MEAS - PA ACC TIME: 0.11 SEC
BH CV ECHO MEAS - PA MAX PG: 6.7 MMHG
BH CV ECHO MEAS - PA PR(ACCEL): 31.3 MMHG
BH CV ECHO MEAS - PA V2 MAX: 129 CM/SEC
BH CV ECHO MEAS - PULM A REVS DUR: 0.12 SEC
BH CV ECHO MEAS - PULM A REVS VEL: 30.6 CM/SEC
BH CV ECHO MEAS - PULM DIAS VEL: 47.1 CM/SEC
BH CV ECHO MEAS - PULM S/D: 1.2
BH CV ECHO MEAS - PULM SYS VEL: 54.3 CM/SEC
BH CV ECHO MEAS - RV MEAN PG: 2 MMHG
BH CV ECHO MEAS - RV V1 MEAN: 57.8 CM/SEC
BH CV ECHO MEAS - RV V1 VTI: 19.9 CM
BH CV ECHO MEAS - SI(AO): 139.6 ML/M^2
BH CV ECHO MEAS - SI(CUBED): 46.1 ML/M^2
BH CV ECHO MEAS - SI(LVOT): 44.7 ML/M^2
BH CV ECHO MEAS - SI(MOD-SP2): 30.4 ML/M^2
BH CV ECHO MEAS - SI(MOD-SP4): 33.1 ML/M^2
BH CV ECHO MEAS - SI(TEICH): 40 ML/M^2
BH CV ECHO MEAS - SV(AO): 287.7 ML
BH CV ECHO MEAS - SV(CUBED): 95 ML
BH CV ECHO MEAS - SV(LVOT): 92.1 ML
BH CV ECHO MEAS - SV(MOD-SP2): 62.7 ML
BH CV ECHO MEAS - SV(MOD-SP4): 68.2 ML
BH CV ECHO MEAS - SV(TEICH): 82.5 ML
BH CV ECHO MEAS - TAPSE (>1.6): 2.9 CM
BH CV ECHO MEASUREMENTS AVERAGE E/E' RATIO: 5.2
BH CV XLRA - RV BASE: 3.7 CM
BH CV XLRA - RV LENGTH: 6 CM
BH CV XLRA - RV MID: 2.8 CM
BH CV XLRA - TDI S': 14.8 CM/SEC
LEFT ATRIUM VOLUME INDEX: 28.8 ML/M2
MAXIMAL PREDICTED HEART RATE: 166 BPM
STRESS TARGET HR: 141 BPM

## 2020-12-29 PROCEDURE — 93306 TTE W/DOPPLER COMPLETE: CPT

## 2020-12-29 PROCEDURE — 0011A: CPT | Performed by: OBSTETRICS & GYNECOLOGY

## 2020-12-29 PROCEDURE — 91301 HC SARSCO02 VAC 100MCG/0.5ML IM: CPT | Performed by: OBSTETRICS & GYNECOLOGY

## 2020-12-29 PROCEDURE — 93306 TTE W/DOPPLER COMPLETE: CPT | Performed by: INTERNAL MEDICINE

## 2020-12-30 ENCOUNTER — LAB (OUTPATIENT)
Dept: LAB | Facility: HOSPITAL | Age: 55
End: 2020-12-30

## 2020-12-30 ENCOUNTER — OFFICE VISIT (OUTPATIENT)
Dept: ONCOLOGY | Facility: CLINIC | Age: 55
End: 2020-12-30

## 2020-12-30 VITALS
RESPIRATION RATE: 12 BRPM | SYSTOLIC BLOOD PRESSURE: 116 MMHG | HEIGHT: 72 IN | HEART RATE: 78 BPM | BODY MASS INDEX: 25.18 KG/M2 | TEMPERATURE: 98 F | WEIGHT: 185.9 LBS | DIASTOLIC BLOOD PRESSURE: 70 MMHG | OXYGEN SATURATION: 98 %

## 2020-12-30 DIAGNOSIS — R91.1 LUNG NODULE: Primary | ICD-10-CM

## 2020-12-30 DIAGNOSIS — C86.6 CUTANEOUS CD30+ LYMPHOPROLIFERATIVE DISORDER (HCC): ICD-10-CM

## 2020-12-30 DIAGNOSIS — R91.1 LUNG NODULE: ICD-10-CM

## 2020-12-30 LAB
BASOPHILS # BLD AUTO: 0.03 10*3/MM3 (ref 0–0.2)
BASOPHILS NFR BLD AUTO: 0.3 % (ref 0–1.5)
DEPRECATED RDW RBC AUTO: 38.4 FL (ref 37–54)
EOSINOPHIL # BLD AUTO: 0.11 10*3/MM3 (ref 0–0.4)
EOSINOPHIL NFR BLD AUTO: 1.1 % (ref 0.3–6.2)
ERYTHROCYTE [DISTWIDTH] IN BLOOD BY AUTOMATED COUNT: 12.2 % (ref 12.3–15.4)
HCT VFR BLD AUTO: 39.2 % (ref 37.5–51)
HGB BLD-MCNC: 13.5 G/DL (ref 13–17.7)
IMM GRANULOCYTES # BLD AUTO: 0.05 10*3/MM3 (ref 0–0.05)
IMM GRANULOCYTES NFR BLD AUTO: 0.5 % (ref 0–0.5)
LYMPHOCYTES # BLD AUTO: 1.14 10*3/MM3 (ref 0.7–3.1)
LYMPHOCYTES NFR BLD AUTO: 11.6 % (ref 19.6–45.3)
MCH RBC QN AUTO: 29.6 PG (ref 26.6–33)
MCHC RBC AUTO-ENTMCNC: 34.4 G/DL (ref 31.5–35.7)
MCV RBC AUTO: 86 FL (ref 79–97)
MONOCYTES # BLD AUTO: 0.55 10*3/MM3 (ref 0.1–0.9)
MONOCYTES NFR BLD AUTO: 5.6 % (ref 5–12)
NEUTROPHILS NFR BLD AUTO: 7.93 10*3/MM3 (ref 1.7–7)
NEUTROPHILS NFR BLD AUTO: 80.9 % (ref 42.7–76)
NRBC BLD AUTO-RTO: 0.2 /100 WBC (ref 0–0.2)
PLATELET # BLD AUTO: 220 10*3/MM3 (ref 140–450)
PMV BLD AUTO: 11.5 FL (ref 6–12)
RBC # BLD AUTO: 4.56 10*6/MM3 (ref 4.14–5.8)
WBC # BLD AUTO: 9.81 10*3/MM3 (ref 3.4–10.8)

## 2020-12-30 PROCEDURE — 85025 COMPLETE CBC W/AUTO DIFF WBC: CPT

## 2020-12-30 PROCEDURE — 36415 COLL VENOUS BLD VENIPUNCTURE: CPT

## 2020-12-30 PROCEDURE — 99213 OFFICE O/P EST LOW 20 MIN: CPT | Performed by: INTERNAL MEDICINE

## 2020-12-30 NOTE — PROGRESS NOTES
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 Das    RECORDS OBTAINED:  Records of the patients history including those obtained from the referring provider were reviewed and summarized in detail.      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  The patient had rather rapid onset of 3 skin lesions in July 2021 on the left chest beneath the axilla, another on the left thigh and the third I believe was on the right lower extremity.  The lesions were raised and a little tender without pruritus.  He had no known bug bites prior to the lesions developing.  He was seen by dermatology and the skin lesion below the left axilla was biopsied and showed a CD30 positive T-cell lymphoproliferative disorder with special stains highlighting CD30 positive aberrant T cells with a background of small lymphocytes, neutrophils, histiocytes and eosinophils.  The ALK gene rearrangement was negative.  Differential diagnosis was given for lymphomatoid papulosis, primary cutaneous anaplastic T-cell lymphoma, less likely exuberant response to an arthropod bite or infectious process.  The patient was prescribed antibiotics by dermatology.  Over the past few weeks the skin lesions have significantly regressed.    The patient has no prior history of such skin lesions.  He has mild fatigue but denies significant weight loss and has no overt night sweats.  He has decent energy.  He denies abdominal pain or loss of appetite.  He has not noted lymphadenopathy.  He denies fevers or chills.    Family history is pertinent for father with pancreatic cancer, paternal grandmother with pancreatic cancer in the patient's sister had breast cancer at a young age.      Abram was seen initially  on 9/23/2020.  He had a normal CBC and differential with no lymphocytosis.  LDH normal.  Serum immunofixation showed no monoclonality.  Peripheral blood flow cytometry showed no abnormal myeloid or lymphoid populations in the peripheral blood.  He underwent a PET scan on 9/28/2020 which showed no definite FDG avid disease in the neck, chest, abdomen or pelvis.  There was a normal size right cervical lymph node with mild uptake favored to be reactive and a sub-6 mm pulmonary nodule in the left upper lobe.  No hypermetabolic skin lesions.    The patient returned today for a follow-up visit.  He is doing well.  He has not noted lymphadenopathy, unusual weight loss, extreme fatigue or night sweats.  He has had no recurrence of skin lesions.    Past Medical History:   Diagnosis Date   • A-fib (CMS/HCC)    • Coronary artery disease    • COVID-19 11/2020   • Hyperlipidemia    • Left bundle branch block         Past Surgical History:   Procedure Laterality Date   • ADENOIDECTOMY     • CARDIAC CATHETERIZATION N/A 8/10/2017    by Eneida Black MD; Left main normal, LAD scattered 10% proximal stenosis, mid LAD 95% concentric stenosis near focal area of calcification, left circumflex normal, first obtuse marginal branch proximal 10% stenosis, and right coronary artery with a 10% proximal and a 30% mid stenosis   • CARDIAC CATHETERIZATION N/A 8/10/2017    by Eneida Black MD; Xience Alpine drug-eluting stent placement 3.5×18 mm to the mid LAD     • CORONARY ANGIOPLASTY WITH STENT PLACEMENT      LAD   • SHOULDER SURGERY Left         Current Outpatient Medications on File Prior to Visit   Medication Sig Dispense Refill   • apixaban (ELIQUIS) 5 MG tablet tablet Take 1 tablet by mouth Every 12 (Twelve) Hours. 180 tablet 3   • atorvastatin (LIPITOR) 20 MG tablet TAKE ONE TABLET BY MOUTH DAILY 90 tablet 3   • metoprolol tartrate (LOPRESSOR) 25 MG tablet Take 1 tablet by mouth 2 (Two) Times a Day. 180 tablet 3     No current  "facility-administered medications on file prior to visit.         ALLERGIES:  No Known Allergies     Social History     Socioeconomic History   • Marital status:      Spouse name: Not on file   • Number of children: Not on file   • Years of education: Not on file   • Highest education level: Not on file   Tobacco Use   • Smoking status: Never Smoker   • Smokeless tobacco: Never Used   Substance and Sexual Activity   • Alcohol use: No     Comment: caffeine use: 5-6 cups daily   • Drug use: No   • Sexual activity: Defer        Family History   Problem Relation Age of Onset   • Cancer Father    • Heart attack Maternal Grandmother    • Cancer Paternal Grandmother         Review of Systems   Constitutional: Negative for activity change, appetite change, fatigue and unexpected weight change.   HENT: Negative.    Respiratory: Negative.    Cardiovascular: Negative.    Gastrointestinal: Negative.    Genitourinary: Negative.    Musculoskeletal: Negative.    Skin:        See the HPI   Allergic/Immunologic: Negative.    Neurological: Negative.    Hematological: Negative.    Psychiatric/Behavioral: Negative.         Objective     Vitals:    12/30/20 1307   BP: 116/70   Pulse: 78   Resp: 12   Temp: 98 °F (36.7 °C)   TempSrc: Infrared   SpO2: 98%   Weight: 84.3 kg (185 lb 14.4 oz)   Height: 182.9 cm (72.01\")   PainSc: 0-No pain     Current Status 12/30/2020   ECOG score 0       Physical Exam    CONSTITUTIONAL: pleasant well-developed adult man  HEENT: no icterus, no thrush, moist membranes  NECK: no jvd  LYMPH: no cervical or supraclavicular lad or axillary lad  CV: RRR, S1S2, no murmur  RESP: cta bilat, no wheezing, no rales  GI: soft, non-tender, no splenomegaly, +bs  MUSC: no edema, normal gait  NEURO: alert and oriented x3, normal strength  PSYCH: normal mood and affect  SKIN: No new skin lesions by limited exam    RECENT LABS:  Hematology WBC   Date Value Ref Range Status   11/21/2020 7.50 3.40 - 10.80 10*3/mm3 Final "     RBC   Date Value Ref Range Status   11/21/2020 4.94 4.14 - 5.80 10*6/mm3 Final     Hemoglobin   Date Value Ref Range Status   11/21/2020 14.1 13.0 - 17.7 g/dL Final     Hematocrit   Date Value Ref Range Status   11/21/2020 44.1 37.5 - 51.0 % Final     Platelets   Date Value Ref Range Status   11/21/2020 255 140 - 450 10*3/mm3 Final        Lab Results   Component Value Date    GLUCOSE 105 (H) 11/21/2020    BUN 15 11/21/2020    CREATININE 1.06 11/21/2020    EGFRIFNONA 73 11/21/2020    BCR 14.2 11/21/2020    K 4.3 11/21/2020    CO2 28.0 11/21/2020    CALCIUM 10.0 11/21/2020    ALBUMIN 4.50 11/21/2020    AST 21 11/21/2020    ALT 21 11/21/2020     PET scan 9/28/2020:  IMPRESSION:  1.  No findings of definite FDG avid disease within the neck, chest,  abdomen or pelvis. Tiny right cervical node demonstrating FDG uptake  mildly above that of blood pool which is favored to be reactive.  However, continued attention on follow-up with CT neck in 3 months is  recommended to ensure stability.  2.  Sub-6 mm pulmonary nodule in the left upper lobe is present and  below PET resolution. Attention on follow-up chest CT in 3 months is  recommended to ensure stability given patient history.  3.  Left nephrolithiasis without hydronephrosis.  4.  Other findings as above.    CT neck and chest 12/28/2020: No lymphadenopathy in the neck.  Stable left upper lobe lung nodule.  No follow-up recommended per radiology given the patient's negative smoking history.    Assessment/Plan     1.  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  Differential diagnosis given by pathology for lymphomatoid papulosis, anaplastic T-cell lymphoma of the skin (rule out systemic), reactive process etc.  The patient is having no significant constitutional symptoms other than mild fatigue.  CBC is normal  with no lymphocytosis.  Peripheral blood flow cytometry showed no abnormal lymphoid or for that matter myeloid populations.  Immunofixation is normal.  PET scan showed no clear evidence of hypermetabolic uptake.  There was nonspecific finding and a right cervical lymph node felt likely to be reactive.  Follow-up CT of the neck 12/28/2020 without abnormal lymphadenopathy.    2.  Familial history of pancreatic cancer affecting the patient's father and paternal grandmother and sister with early age of breast cancer.  I discussed with the patient this sounds concerning for possible genetic malignancy trait such as a BRCA mutation.  I recommended the patient to be seen by genetics and he was agreeable-a referral was made    3.  Sub-6 mm left upper lobe nodule.  Stable on follow-up CT chest 12/28/2020.  No specific follow-up required per radiology given the patient's negative smoking history.    At this time I see no evidence that the patient has systemic involvement of lymphoma.  The patient is followed routinely by dermatology.  I will see him back as needed.

## 2021-01-02 ENCOUNTER — LAB (OUTPATIENT)
Dept: LAB | Facility: HOSPITAL | Age: 56
End: 2021-01-02

## 2021-01-02 DIAGNOSIS — Z01.818 OTHER SPECIFIED PRE-OPERATIVE EXAMINATION: ICD-10-CM

## 2021-01-05 ENCOUNTER — HOSPITAL ENCOUNTER (OUTPATIENT)
Dept: NUCLEAR MEDICINE | Facility: HOSPITAL | Age: 56
Discharge: HOME | End: 2021-01-05

## 2021-01-05 ENCOUNTER — HOSPITAL ENCOUNTER (OUTPATIENT)
Dept: CARDIOLOGY | Facility: HOSPITAL | Age: 56
Discharge: HOME | End: 2021-01-05

## 2021-01-05 ENCOUNTER — TELEPHONE (OUTPATIENT)
Dept: CARDIOLOGY | Facility: CLINIC | Age: 56
End: 2021-01-05

## 2021-01-05 DIAGNOSIS — I25.10 CORONARY ARTERY DISEASE INVOLVING NATIVE CORONARY ARTERY OF NATIVE HEART WITHOUT ANGINA PECTORIS: ICD-10-CM

## 2021-01-05 DIAGNOSIS — R94.39 ABNORMAL NUCLEAR STRESS TEST: ICD-10-CM

## 2021-01-05 DIAGNOSIS — I25.10 CORONARY ARTERY DISEASE INVOLVING NATIVE CORONARY ARTERY OF NATIVE HEART WITHOUT ANGINA PECTORIS: Primary | ICD-10-CM

## 2021-01-05 DIAGNOSIS — I48.0 AF (PAROXYSMAL ATRIAL FIBRILLATION) (HCC): ICD-10-CM

## 2021-01-05 LAB
BH CV NUCLEAR PRIOR STUDY: 3
BH CV STRESS BP STAGE 1: NORMAL
BH CV STRESS BP STAGE 2: NORMAL
BH CV STRESS BP STAGE 3: NORMAL
BH CV STRESS BP STAGE 4: NORMAL
BH CV STRESS DURATION MIN STAGE 1: 3
BH CV STRESS DURATION MIN STAGE 2: 3
BH CV STRESS DURATION MIN STAGE 3: 3
BH CV STRESS DURATION MIN STAGE 4: 1
BH CV STRESS DURATION SEC STAGE 1: 0
BH CV STRESS DURATION SEC STAGE 2: 0
BH CV STRESS DURATION SEC STAGE 3: 0
BH CV STRESS DURATION SEC STAGE 4: 48
BH CV STRESS GRADE STAGE 1: 10
BH CV STRESS GRADE STAGE 2: 12
BH CV STRESS GRADE STAGE 3: 14
BH CV STRESS GRADE STAGE 4: 16
BH CV STRESS HR STAGE 1: 92
BH CV STRESS HR STAGE 2: 101
BH CV STRESS HR STAGE 3: 126
BH CV STRESS HR STAGE 4: 143
BH CV STRESS METS STAGE 1: 5
BH CV STRESS METS STAGE 2: 7.5
BH CV STRESS METS STAGE 3: 10
BH CV STRESS METS STAGE 4: 13.5
BH CV STRESS PROTOCOL 1: NORMAL
BH CV STRESS RECOVERY BP: NORMAL MMHG
BH CV STRESS RECOVERY HR: 75 BPM
BH CV STRESS SPEED STAGE 1: 1.7
BH CV STRESS SPEED STAGE 2: 2.5
BH CV STRESS SPEED STAGE 3: 3.4
BH CV STRESS SPEED STAGE 4: 4.2
BH CV STRESS STAGE 1: 1
BH CV STRESS STAGE 2: 2
BH CV STRESS STAGE 3: 3
BH CV STRESS STAGE 4: 4
LV EF NUC BP: 56 %
MAXIMAL PREDICTED HEART RATE: 166 BPM
PERCENT MAX PREDICTED HR: 93.98 %
STRESS BASELINE BP: NORMAL MMHG
STRESS O2 SAT REST: 99 %
STRESS PERCENT HR: 111 %
STRESS POST ESTIMATED WORKLOAD: 13.2 METS
STRESS POST EXERCISE DUR MIN: 10 MIN
STRESS POST EXERCISE DUR SEC: 49 SEC
STRESS POST O2 SAT PEAK: 99 %
STRESS POST PEAK BP: NORMAL MMHG
STRESS POST PEAK HR: 156 BPM
STRESS TARGET HR: 141 BPM

## 2021-01-05 PROCEDURE — 93017 CV STRESS TEST TRACING ONLY: CPT

## 2021-01-05 PROCEDURE — 93016 CV STRESS TEST SUPVJ ONLY: CPT | Performed by: INTERNAL MEDICINE

## 2021-01-05 PROCEDURE — A9500 TC99M SESTAMIBI: HCPCS | Performed by: INTERNAL MEDICINE

## 2021-01-05 PROCEDURE — 0 TECHNETIUM SESTAMIBI: Performed by: INTERNAL MEDICINE

## 2021-01-05 PROCEDURE — 93018 CV STRESS TEST I&R ONLY: CPT | Performed by: INTERNAL MEDICINE

## 2021-01-05 PROCEDURE — 78452 HT MUSCLE IMAGE SPECT MULT: CPT | Performed by: INTERNAL MEDICINE

## 2021-01-05 PROCEDURE — 78452 HT MUSCLE IMAGE SPECT MULT: CPT

## 2021-01-05 RX ADMIN — TECHNETIUM TC 99M SESTAMIBI 1 DOSE: 1 INJECTION INTRAVENOUS at 08:46

## 2021-01-05 RX ADMIN — TECHNETIUM TC 99M SESTAMIBI 1 DOSE: 1 INJECTION INTRAVENOUS at 07:05

## 2021-01-05 NOTE — NURSING NOTE
Dr. Hall consulted about possible ST depression with LBBB during treadmill test this morning.  Will advise care once nuclear results are in.

## 2021-01-06 ENCOUNTER — TRANSCRIBE ORDERS (OUTPATIENT)
Dept: CARDIOLOGY | Facility: CLINIC | Age: 56
End: 2021-01-06

## 2021-01-06 DIAGNOSIS — Z13.6 SCREENING FOR ISCHEMIC HEART DISEASE: ICD-10-CM

## 2021-01-06 DIAGNOSIS — Z01.810 PRE-OPERATIVE CARDIOVASCULAR EXAMINATION: Primary | ICD-10-CM

## 2021-01-06 PROBLEM — R94.39 ABNORMAL NUCLEAR STRESS TEST: Status: ACTIVE | Noted: 2021-01-06

## 2021-01-07 ENCOUNTER — LAB (OUTPATIENT)
Dept: LAB | Facility: HOSPITAL | Age: 56
End: 2021-01-07

## 2021-01-07 DIAGNOSIS — Z01.810 PRE-OPERATIVE CARDIOVASCULAR EXAMINATION: ICD-10-CM

## 2021-01-07 DIAGNOSIS — Z13.6 SCREENING FOR ISCHEMIC HEART DISEASE: ICD-10-CM

## 2021-01-07 LAB
ANION GAP SERPL CALCULATED.3IONS-SCNC: 11.3 MMOL/L (ref 5–15)
BASOPHILS # BLD AUTO: 0.04 10*3/MM3 (ref 0–0.2)
BASOPHILS NFR BLD AUTO: 0.6 % (ref 0–1.5)
BUN SERPL-MCNC: 23 MG/DL (ref 6–20)
BUN/CREAT SERPL: 27.4 (ref 7–25)
CALCIUM SPEC-SCNC: 9 MG/DL (ref 8.6–10.5)
CHLORIDE SERPL-SCNC: 105 MMOL/L (ref 98–107)
CO2 SERPL-SCNC: 23.7 MMOL/L (ref 22–29)
CREAT SERPL-MCNC: 0.84 MG/DL (ref 0.76–1.27)
DEPRECATED RDW RBC AUTO: 38.9 FL (ref 37–54)
EOSINOPHIL # BLD AUTO: 0.19 10*3/MM3 (ref 0–0.4)
EOSINOPHIL NFR BLD AUTO: 2.8 % (ref 0.3–6.2)
ERYTHROCYTE [DISTWIDTH] IN BLOOD BY AUTOMATED COUNT: 12.1 % (ref 12.3–15.4)
GFR SERPL CREATININE-BSD FRML MDRD: 95 ML/MIN/1.73
GLUCOSE SERPL-MCNC: 107 MG/DL (ref 65–99)
HCT VFR BLD AUTO: 39.5 % (ref 37.5–51)
HGB BLD-MCNC: 13.3 G/DL (ref 13–17.7)
IMM GRANULOCYTES # BLD AUTO: 0.03 10*3/MM3 (ref 0–0.05)
IMM GRANULOCYTES NFR BLD AUTO: 0.4 % (ref 0–0.5)
LYMPHOCYTES # BLD AUTO: 1.65 10*3/MM3 (ref 0.7–3.1)
LYMPHOCYTES NFR BLD AUTO: 24.3 % (ref 19.6–45.3)
MCH RBC QN AUTO: 29.6 PG (ref 26.6–33)
MCHC RBC AUTO-ENTMCNC: 33.7 G/DL (ref 31.5–35.7)
MCV RBC AUTO: 88 FL (ref 79–97)
MONOCYTES # BLD AUTO: 0.76 10*3/MM3 (ref 0.1–0.9)
MONOCYTES NFR BLD AUTO: 11.2 % (ref 5–12)
NEUTROPHILS NFR BLD AUTO: 4.12 10*3/MM3 (ref 1.7–7)
NEUTROPHILS NFR BLD AUTO: 60.7 % (ref 42.7–76)
NRBC BLD AUTO-RTO: 0 /100 WBC (ref 0–0.2)
PLATELET # BLD AUTO: 245 10*3/MM3 (ref 140–450)
PMV BLD AUTO: 11.9 FL (ref 6–12)
POTASSIUM SERPL-SCNC: 4.4 MMOL/L (ref 3.5–5.2)
RBC # BLD AUTO: 4.49 10*6/MM3 (ref 4.14–5.8)
SODIUM SERPL-SCNC: 140 MMOL/L (ref 136–145)
WBC # BLD AUTO: 6.79 10*3/MM3 (ref 3.4–10.8)

## 2021-01-07 PROCEDURE — 80048 BASIC METABOLIC PNL TOTAL CA: CPT

## 2021-01-07 PROCEDURE — 36415 COLL VENOUS BLD VENIPUNCTURE: CPT

## 2021-01-07 PROCEDURE — 85025 COMPLETE CBC W/AUTO DIFF WBC: CPT

## 2021-01-11 ENCOUNTER — TRANSCRIBE ORDERS (OUTPATIENT)
Dept: OBSTETRICS AND GYNECOLOGY | Facility: CLINIC | Age: 56
End: 2021-01-11

## 2021-01-11 ENCOUNTER — TRANSCRIBE ORDERS (OUTPATIENT)
Dept: SLEEP MEDICINE | Facility: HOSPITAL | Age: 56
End: 2021-01-11

## 2021-01-11 DIAGNOSIS — Z01.818 OTHER SPECIFIED PRE-OPERATIVE EXAMINATION: Primary | ICD-10-CM

## 2021-01-13 ENCOUNTER — TELEPHONE (OUTPATIENT)
Dept: CARDIOLOGY | Facility: CLINIC | Age: 56
End: 2021-01-13

## 2021-01-13 ENCOUNTER — LAB (OUTPATIENT)
Dept: LAB | Facility: HOSPITAL | Age: 56
End: 2021-01-13

## 2021-01-13 DIAGNOSIS — Z01.818 OTHER SPECIFIED PRE-OPERATIVE EXAMINATION: ICD-10-CM

## 2021-01-13 LAB — SARS-COV-2 ORF1AB RESP QL NAA+PROBE: DETECTED

## 2021-01-13 PROCEDURE — U0004 COV-19 TEST NON-CDC HGH THRU: HCPCS | Performed by: OBSTETRICS & GYNECOLOGY

## 2021-01-13 PROCEDURE — C9803 HOPD COVID-19 SPEC COLLECT: HCPCS

## 2021-01-13 NOTE — TELEPHONE ENCOUNTER
Abram left a voicemail saying he had COVID a while back (asymptomatic) and was told he might test positive for up to three months. He has a heart cath scheduled with you this Friday and did his pre admission COVID testing and it came back positive. He is still asymptomatic and wanting to know if you would still do his cath or if he needs to re-schedule.

## 2021-01-15 ENCOUNTER — HOSPITAL ENCOUNTER (OUTPATIENT)
Facility: HOSPITAL | Age: 56
Setting detail: HOSPITAL OUTPATIENT SURGERY
Discharge: HOME | End: 2021-01-15
Attending: INTERNAL MEDICINE | Admitting: INTERNAL MEDICINE

## 2021-01-15 VITALS
RESPIRATION RATE: 16 BRPM | TEMPERATURE: 98 F | WEIGHT: 185 LBS | BODY MASS INDEX: 25.06 KG/M2 | HEIGHT: 72 IN | SYSTOLIC BLOOD PRESSURE: 106 MMHG | HEART RATE: 48 BPM | OXYGEN SATURATION: 98 % | DIASTOLIC BLOOD PRESSURE: 73 MMHG

## 2021-01-15 DIAGNOSIS — I25.10 CORONARY ARTERY DISEASE INVOLVING NATIVE CORONARY ARTERY OF NATIVE HEART WITHOUT ANGINA PECTORIS: ICD-10-CM

## 2021-01-15 DIAGNOSIS — R94.39 ABNORMAL NUCLEAR STRESS TEST: ICD-10-CM

## 2021-01-15 PROCEDURE — 25010000002 FENTANYL CITRATE (PF) 100 MCG/2ML SOLUTION: Performed by: INTERNAL MEDICINE

## 2021-01-15 PROCEDURE — 0 IOPAMIDOL PER 1 ML: Performed by: INTERNAL MEDICINE

## 2021-01-15 PROCEDURE — C1894 INTRO/SHEATH, NON-LASER: HCPCS | Performed by: INTERNAL MEDICINE

## 2021-01-15 PROCEDURE — 93458 L HRT ARTERY/VENTRICLE ANGIO: CPT | Performed by: INTERNAL MEDICINE

## 2021-01-15 PROCEDURE — 25010000002 MIDAZOLAM PER 1 MG: Performed by: INTERNAL MEDICINE

## 2021-01-15 PROCEDURE — C1769 GUIDE WIRE: HCPCS | Performed by: INTERNAL MEDICINE

## 2021-01-15 PROCEDURE — 25010000002 HEPARIN (PORCINE) PER 1000 UNITS: Performed by: INTERNAL MEDICINE

## 2021-01-15 RX ORDER — LIDOCAINE HYDROCHLORIDE 20 MG/ML
INJECTION, SOLUTION INFILTRATION; PERINEURAL AS NEEDED
Status: DISCONTINUED | OUTPATIENT
Start: 2021-01-15 | End: 2021-01-15 | Stop reason: HOSPADM

## 2021-01-15 RX ORDER — MIDAZOLAM HYDROCHLORIDE 1 MG/ML
INJECTION INTRAMUSCULAR; INTRAVENOUS AS NEEDED
Status: DISCONTINUED | OUTPATIENT
Start: 2021-01-15 | End: 2021-01-15 | Stop reason: HOSPADM

## 2021-01-15 RX ORDER — SODIUM CHLORIDE 9 MG/ML
75 INJECTION, SOLUTION INTRAVENOUS CONTINUOUS
Status: DISCONTINUED | OUTPATIENT
Start: 2021-01-15 | End: 2021-01-15 | Stop reason: HOSPADM

## 2021-01-15 RX ORDER — SODIUM CHLORIDE 0.9 % (FLUSH) 0.9 %
10 SYRINGE (ML) INJECTION AS NEEDED
Status: DISCONTINUED | OUTPATIENT
Start: 2021-01-15 | End: 2021-01-15 | Stop reason: HOSPADM

## 2021-01-15 RX ORDER — SODIUM CHLORIDE 0.9 % (FLUSH) 0.9 %
3 SYRINGE (ML) INJECTION EVERY 12 HOURS SCHEDULED
Status: CANCELLED | OUTPATIENT
Start: 2021-01-15

## 2021-01-15 RX ORDER — ACETAMINOPHEN 325 MG/1
650 TABLET ORAL EVERY 4 HOURS PRN
Status: DISCONTINUED | OUTPATIENT
Start: 2021-01-15 | End: 2021-01-15 | Stop reason: HOSPADM

## 2021-01-15 RX ORDER — SODIUM CHLORIDE 0.9 % (FLUSH) 0.9 %
3 SYRINGE (ML) INJECTION EVERY 12 HOURS SCHEDULED
Status: DISCONTINUED | OUTPATIENT
Start: 2021-01-15 | End: 2021-01-15 | Stop reason: HOSPADM

## 2021-01-15 RX ORDER — FENTANYL CITRATE 50 UG/ML
INJECTION, SOLUTION INTRAMUSCULAR; INTRAVENOUS AS NEEDED
Status: DISCONTINUED | OUTPATIENT
Start: 2021-01-15 | End: 2021-01-15 | Stop reason: HOSPADM

## 2021-01-15 RX ORDER — SODIUM CHLORIDE 0.9 % (FLUSH) 0.9 %
10 SYRINGE (ML) INJECTION AS NEEDED
Status: CANCELLED | OUTPATIENT
Start: 2021-01-15

## 2021-01-15 RX ADMIN — SODIUM CHLORIDE 75 ML/HR: 9 INJECTION, SOLUTION INTRAVENOUS at 06:56

## 2021-01-15 NOTE — H&P
Patient Name: Abram Allen  Age/Sex: 55 y.o. male  : 1965  MRN: [id number]    Date of Admission: 1/15/2021  Date of Encounter Visit: 01/15/21  Encounter Provider: Emile Krause MD  [facility]      Referring Provider: Emile Krause MD  Patient Care Team:  Nikko Hastings MD as PCP - General (Family Medicine)  Rocael Das MD as Referring Physician (Dermatology)  Andrez Nix MD as Consulting Physician (Hematology and Oncology)    Subjective:   Admitted for: Cardiac catheterization    Chief Complaint: Chest pain, paroxysmal atrial fibrillation    History of Present Illness:  Abram Allen is a 55 y.o. male with a known history of coronary artery disease.  He also recently had COVID-19 infection.  He developed atrial fibrillation in November.  During his episode he was experiencing chest discomfort.  His stress test was performed showing anterior ischemia.  Previous stent to the LAD.  He is here for catheterization.        Past Medical History:  Past Medical History:   Diagnosis Date   • A-fib (CMS/HCC)    • Coronary artery disease    • COVID-19 2020   • Hyperlipidemia    • Left bundle branch block        Past Surgical History:   Procedure Laterality Date   • ADENOIDECTOMY     • CARDIAC CATHETERIZATION N/A 8/10/2017    by Eneida Black MD; Left main normal, LAD scattered 10% proximal stenosis, mid LAD 95% concentric stenosis near focal area of calcification, left circumflex normal, first obtuse marginal branch proximal 10% stenosis, and right coronary artery with a 10% proximal and a 30% mid stenosis   • CARDIAC CATHETERIZATION N/A 8/10/2017    by Eneida Black MD; Xience Alpine drug-eluting stent placement 3.5×18 mm to the mid LAD     • CORONARY ANGIOPLASTY WITH STENT PLACEMENT      LAD   • SHOULDER SURGERY Left        Home Medications:   Medications Prior to Admission   Medication Sig Dispense Refill  Last Dose   • atorvastatin (LIPITOR) 20 MG tablet TAKE ONE TABLET BY MOUTH DAILY 90 tablet 3 1/14/2021 at Unknown time   • metoprolol tartrate (LOPRESSOR) 25 MG tablet Take 1 tablet by mouth 2 (Two) Times a Day. 180 tablet 3 1/15/2021 at Unknown time   • apixaban (ELIQUIS) 5 MG tablet tablet Take 1 tablet by mouth Every 12 (Twelve) Hours. 180 tablet 3 1/11/2021       Allergies:  No Known Allergies    Past Social History:  Social History     Socioeconomic History   • Marital status:      Spouse name: Not on file   • Number of children: Not on file   • Years of education: Not on file   • Highest education level: Not on file   Tobacco Use   • Smoking status: Never Smoker   • Smokeless tobacco: Never Used   Substance and Sexual Activity   • Alcohol use: Yes     Comment: caffeine use: 5-6 cups daily; VERY RARELY DRINKS   • Drug use: No   • Sexual activity: Defer        Past Family History:  Family History   Problem Relation Age of Onset   • Cancer Father    • Heart attack Maternal Grandmother    • Cancer Paternal Grandmother        Review of Systems: All systems reviewed. Pertinent positives identified in HPI. All other systems are negative.     REVIEW OF SYSTEMS:   CONSTITUTIONAL: No weight loss, fever, chills, weakness or fatigue.   HEENT: Eyes: No visual loss, blurred vision, double vision or yellow sclerae. Ears, Nose, Throat: No hearing loss, sneezing, congestion, runny nose or sore throat.   SKIN: No rash or itching.     RESPIRATORY: No shortness of breath, hemoptysis, cough or sputum.   GASTROINTESTINAL: No anorexia, nausea, vomiting or diarrhea. No abdominal pain, bright red blood per rectum or melena.  GENITOURINARY: No burning on urination, hematuria or increased frequency.  NEUROLOGICAL: No headache, dizziness, syncope, paralysis, ataxia, numbness or tingling in the extremities. No change in bowel or bladder control.   MUSCULOSKELETAL: No muscle, back pain, joint pain or stiffness.   HEMATOLOGIC: No  anemia, bleeding or bruising.   LYMPHATICS: No enlarged nodes. No history of splenectomy.   PSYCHIATRIC: No history of depression, anxiety, hallucinations.   ENDOCRINOLOGIC: No reports of sweating, cold or heat intolerance. No polyuria or polydipsia.       Objective:     Objective:  Temp:  [98 °F (36.7 °C)] 98 °F (36.7 °C)  Heart Rate:  [52] 52  Resp:  [18] 18  BP: (127)/(73) 127/73  No intake or output data in the 24 hours ending 01/15/21 0725  Body mass index is 25.09 kg/m².      01/15/21  0651   Weight: 83.9 kg (185 lb)           Physical Exam:   Vitals signs reviewed.   Constitutional:       Appearance: Well-developed.   Eyes:      Pupils: Pupils are equal, round, and reactive to light.   HENT:      Head: Normocephalic.   Neck:      Musculoskeletal: Normal range of motion.      Thyroid: No thyromegaly.      Vascular: No carotid bruit or JVD.   Pulmonary:      Effort: Pulmonary effort is normal.      Breath sounds: Normal breath sounds.   Cardiovascular:      Normal rate. Regular rhythm.      No gallop.   Pulses:     Intact distal pulses.   Edema:     Peripheral edema absent.   Abdominal:      General: Bowel sounds are normal.      Palpations: Abdomen is soft.   Skin:     General: Skin is warm and dry.      Findings: No erythema.   Neurological:      Mental Status: Alert and oriented to person, place, and time.           Lab Review:                                                 Imaging:      Imaging Results (Most Recent)     None          EKG:         Baseline:     I personally viewed and interpreted the patient's EKG/Telemetry data.    Assessment:   Assessment/Plan     1. CAD  2. Positive stress test      Plan:     Patient understands procedure and agrees with procedure    Thank you for allowing me to participate in the care of Abram Allen. Feel free to contact me directly with any further questions or concerns.    Emile Krause MD  [facility]  01/15/21  07:25 EST

## 2021-01-15 NOTE — DISCHARGE INSTRUCTIONS
RESUME ELIQUIS TOMORROW MORNING.      [facility]  [address] [place] [address]    Coronary Angiogram (Radial/Ulnar Approach) After Care    Refer to this sheet in the next few weeks. These instructions provide you with information on caring for yourself after your procedure. Your caregiver may also give you more specific instructions. Your treatment has been planned according to current medical practices, but problems sometimes occur. Call your caregiver if you have any problems or questions after your procedure.    Home Care Instructions:  · You may shower the day after the procedure. Remove the bandage (dressing) and gently wash the site with plain soap and water. Gently pat the site dry. You may apply a band aid daily for 2 days if desired.    · Do not apply powder or lotion to the site.  · Do not submerge the affected site in water for 3 to 5 days or until the site is completely healed.   · Do not lift, push or pull anything over 5 pounds for 5 days after your procedure. As a reference, a gallon of milk weighs 8 pounds.   · Inspect the site at least twice daily. You may notice some bruising at the site and it may be tender for 1 to 2 weeks.     · Increase your fluid intake for the next 2 days.    · Keep arm elevated for 24 hours. For the remainder of the day, keep your arm in “Pledge of Allegiance” position when up and about.     · You may drive 24 hours after the procedure unless otherwise instructed by your caregiver.  · Do not operate machinery or power tools for 24 hours.  · A responsible adult should be with you for the first 24 hours after you arrive home. Do not make any important legal decisions or sign legal papers for 24 hours.  Do not drink alcohol for 24 hours.    · Metformin or any medications containing Metformin should not be taken for 48 hours after your procedure.      Call Your Doctor if:   · You have unusual pain at the radial/ulnar (wrist) site.  · You have redness,  warmth, swelling, or pain at the radial/ulnar (wrist) site.  · You have drainage (other than a small amount of blood on the dressing).  · You have chills or a fever > 101.  · Your arm becomes pale or dark, cool, tingly, or numb.  · You have heavy bleeding from the site, hold pressure on the site for 20 minutes.  If the bleeding stops, apply a fresh bandage and call your cardiologist.  However, if you continue to have bleeding, call 911.

## 2021-01-19 ENCOUNTER — TELEPHONE (OUTPATIENT)
Dept: CARDIOLOGY | Facility: CLINIC | Age: 56
End: 2021-01-19

## 2021-01-25 ENCOUNTER — IMMUNIZATION (OUTPATIENT)
Dept: VACCINE CLINIC | Facility: HOSPITAL | Age: 56
End: 2021-01-25

## 2021-01-25 PROCEDURE — 91301 HC SARSCO02 VAC 100MCG/0.5ML IM: CPT | Performed by: OBSTETRICS & GYNECOLOGY

## 2021-01-25 PROCEDURE — 0012A: CPT | Performed by: OBSTETRICS & GYNECOLOGY

## 2021-03-08 ENCOUNTER — TELEPHONE (OUTPATIENT)
Dept: ONCOLOGY | Facility: CLINIC | Age: 56
End: 2021-03-08

## 2021-03-08 NOTE — TELEPHONE ENCOUNTER
Caller: DANNY MULLINS    Relationship to patient: SELF    Best call back number: [phone]    PT IS CALLING TO SPEAK TO NICOLÁS BRENNAN REGARDING HIS [facility] BILL. PLEASE CALL BACK WHEN POSSIBLE

## 2021-03-11 ENCOUNTER — TELEPHONE (OUTPATIENT)
Dept: ONCOLOGY | Facility: CLINIC | Age: 56
End: 2021-03-11

## 2021-03-11 NOTE — TELEPHONE ENCOUNTER
Caller: PT    Relationship to patient: PT    Best call back number: [phone]    PT CALLING FOR GLENDY BRENNAN REGARDING ASSISTANCE WITH A BILL HE HAD PREVIOUSLY DISCUSSED WITH HER.    PLEASE RETURN CALL    THANK YOU

## 2021-10-04 ENCOUNTER — TELEPHONE (OUTPATIENT)
Dept: CARDIOLOGY | Facility: CLINIC | Age: 56
End: 2021-10-04

## 2021-10-04 NOTE — TELEPHONE ENCOUNTER
The patient presented to the window at [place] stating he continues to work out at [place] and noted racing heart.  He had the cardiac rehab/physical therapy staff hook him up to a monitor which revealed he seems to be sliipping in and out of a fib while in exercise. He was advised to contact you for direction on does he need to be concerned and/or what should he do.  Please advise

## 2021-10-05 DIAGNOSIS — I48.0 AF (PAROXYSMAL ATRIAL FIBRILLATION) (HCC): Primary | ICD-10-CM

## 2021-11-02 DIAGNOSIS — I48.0 AF (PAROXYSMAL ATRIAL FIBRILLATION) (HCC): Primary | ICD-10-CM

## 2021-11-05 ENCOUNTER — TELEPHONE (OUTPATIENT)
Dept: GASTROENTEROLOGY | Facility: CLINIC | Age: 56
End: 2021-11-05

## 2021-11-09 NOTE — TELEPHONE ENCOUNTER
SPOKE WITH PATIENT.  HE MAILED THE FAST TRACK YESTERDAY.      STILL ON ELIQUIS - DR. Hickey PRESCRIBES.

## 2021-11-10 ENCOUNTER — TELEPHONE (OUTPATIENT)
Dept: GASTROENTEROLOGY | Facility: CLINIC | Age: 56
End: 2021-11-10

## 2021-11-10 NOTE — TELEPHONE ENCOUNTER
FAST TRACK - 5 YEAR RECALL - LAST COLONOSCOPY 05/01/2017, REQUEST OP REPORT AT [facility].  WE HAVE THE PATH REPORT.  PERSONAL HISTORY POLYPS - SCHEDULE AT [facility].

## 2021-11-14 ENCOUNTER — PREP FOR SURGERY (OUTPATIENT)
Dept: SURGERY | Facility: SURGERY CENTER | Age: 56
End: 2021-11-14

## 2021-11-14 DIAGNOSIS — Z86.010 PERSONAL HISTORY OF COLONIC POLYPS: Primary | ICD-10-CM

## 2021-11-16 ENCOUNTER — HOSPITAL ENCOUNTER (OUTPATIENT)
Dept: CARDIOLOGY | Facility: HOSPITAL | Age: 56
Discharge: HOME | End: 2021-11-16
Admitting: INTERNAL MEDICINE

## 2021-11-16 DIAGNOSIS — I48.0 AF (PAROXYSMAL ATRIAL FIBRILLATION) (HCC): ICD-10-CM

## 2021-11-16 PROBLEM — Z86.010 PERSONAL HISTORY OF COLONIC POLYPS: Status: ACTIVE | Noted: 2021-11-16

## 2021-11-16 PROBLEM — Z86.0100 PERSONAL HISTORY OF COLONIC POLYPS: Status: ACTIVE | Noted: 2021-11-16

## 2021-11-16 PROCEDURE — 93242 EXT ECG>48HR<7D RECORDING: CPT

## 2021-11-16 PROCEDURE — 93246 EXT ECG>7D<15D RECORDING: CPT

## 2021-11-16 RX ORDER — ATORVASTATIN CALCIUM 20 MG/1
20 TABLET, FILM COATED ORAL DAILY
Qty: 90 TABLET | Refills: 3 | Status: SHIPPED | OUTPATIENT
Start: 2021-11-16 | End: 2022-11-07 | Stop reason: SDUPTHER

## 2021-11-16 NOTE — TELEPHONE ENCOUNTER
Spoke with patient.  Scheduled at [facility] on 05/09/2022 at 9:30am - arrive 8:30am.  Will mail instructions.    COVID test on 05/06/2022, will call with time.  Need to self quarantine until after procedure.  He understands.

## 2021-11-16 NOTE — TELEPHONE ENCOUNTER
Patient called to reschedule.  He will be [place] for his daughter gradulation.      Scheduled at [facility] on 05/23/2022 at 8am - arrive 7am.  Will mail instructions.

## 2021-11-18 ENCOUNTER — TELEPHONE (OUTPATIENT)
Dept: GASTROENTEROLOGY | Facility: CLINIC | Age: 56
End: 2021-11-18

## 2021-11-29 NOTE — TELEPHONE ENCOUNTER
Rx Refill Note  Requested Prescriptions     Pending Prescriptions Disp Refills   • apixaban (Eliquis) 5 MG tablet tablet 180 tablet 3     Sig: Take 1 tablet by mouth Every 12 (Twelve) Hours.      Last office visit with prescribing clinician: 12/2/2020      Next office visit with prescribing clinician: 12/10/2021 GEOVANY Pickens MA  11/29/21, 14:58 EST

## 2021-11-30 ENCOUNTER — OFFICE VISIT (OUTPATIENT)
Dept: CARDIOLOGY | Facility: CLINIC | Age: 56
End: 2021-11-30

## 2021-11-30 VITALS
BODY MASS INDEX: 25.73 KG/M2 | WEIGHT: 190 LBS | HEART RATE: 53 BPM | HEIGHT: 72 IN | SYSTOLIC BLOOD PRESSURE: 122 MMHG | DIASTOLIC BLOOD PRESSURE: 84 MMHG

## 2021-11-30 DIAGNOSIS — I48.0 PAROXYSMAL ATRIAL FIBRILLATION (HCC): Primary | ICD-10-CM

## 2021-11-30 DIAGNOSIS — I25.10 CORONARY ARTERY DISEASE INVOLVING NATIVE CORONARY ARTERY OF NATIVE HEART WITHOUT ANGINA PECTORIS: ICD-10-CM

## 2021-11-30 PROCEDURE — 93000 ELECTROCARDIOGRAM COMPLETE: CPT | Performed by: INTERNAL MEDICINE

## 2021-11-30 PROCEDURE — 99214 OFFICE O/P EST MOD 30 MIN: CPT | Performed by: INTERNAL MEDICINE

## 2021-11-30 NOTE — PROGRESS NOTES
Date of Office Visit: 2021  Encounter Provider: Rashi Cortez MD  Place of Service: [facility] CARDIOLOGY  Patient Name: Abram Allen  :1965    Chief Complaint   Patient presents with   • paroxysmal AFIB     New Patient Consult - Dr. Hall  ref   • LBBB   • Coronary Artery Disease   • s/p stent   :     HPI: Abram Allen is a 56 y.o. male who presents today for paroxysmal atrial fibrillation.    Patient has a history of coronary artery disease treated with stent in 2017.    He had an episode of paroxysmal atrial fibrillation that lasted several days and resulted in emergency room visit in the fall of last year.    Since then he notices episodes of palpitations which generally last just seconds.    He has not had any episodes similar to the one last fall.    He is currently wearing a Holter monitor.          Past Medical History:   Diagnosis Date   • A-fib ([facility])    • Coronary artery disease    • COVID-19 2020   • Hyperlipidemia    • Left bundle branch block        Past Surgical History:   Procedure Laterality Date   • ADENOIDECTOMY     • CARDIAC CATHETERIZATION N/A 8/10/2017    by Eneida Black MD; Left main normal, LAD scattered 10% proximal stenosis, mid LAD 95% concentric stenosis near focal area of calcification, left circumflex normal, first obtuse marginal branch proximal 10% stenosis, and right coronary artery with a 10% proximal and a 30% mid stenosis   • CARDIAC CATHETERIZATION N/A 8/10/2017    by Eneida Black MD; Xience Alpine drug-eluting stent placement 3.5×18 mm to the mid LAD     • CARDIAC CATHETERIZATION N/A 1/15/2021    Procedure: Coronary angiography;  Surgeon: Emile Krause MD;  Location: [facility] CATH INVASIVE LOCATION;  Service: Cardiovascular;  Laterality: N/A;   • CARDIAC CATHETERIZATION N/A 1/15/2021    Procedure: Left Heart Cath;  Surgeon: Emile Krause MD;  Location: [facility] CATH INVASIVE LOCATION;  Service:  Cardiovascular;  Laterality: N/A;   • CARDIAC CATHETERIZATION N/A 1/15/2021    Procedure: Left ventriculography;  Surgeon: Emile Krause MD;  Location: [facility] INVASIVE LOCATION;  Service: Cardiovascular;  Laterality: N/A;   • CORONARY ANGIOPLASTY WITH STENT PLACEMENT      LAD   • SHOULDER SURGERY Left        Social History     Socioeconomic History   • Marital status:    Tobacco Use   • Smoking status: Never Smoker   • Smokeless tobacco: Never Used   Vaping Use   • Vaping Use: Never used   Substance and Sexual Activity   • Alcohol use: Yes     Comment: caffeine use: 5-6 cups daily; VERY RARELY DRINKS   • Drug use: No   • Sexual activity: Defer       Family History   Problem Relation Age of Onset   • Cancer Father    • Heart attack Maternal Grandmother    • Cancer Paternal Grandmother        Review of Systems   Constitutional: Negative for malaise/fatigue.   HENT: Negative.    Eyes: Negative.    Cardiovascular: Positive for palpitations. Negative for chest pain, dyspnea on exertion, leg swelling and near-syncope.   Respiratory: Negative for cough and shortness of breath.    Endocrine: Negative.    Hematologic/Lymphatic: Negative.    Skin: Negative.    Musculoskeletal: Negative.    Gastrointestinal: Negative.    Genitourinary: Negative.    Neurological: Negative.  Negative for weakness.   Psychiatric/Behavioral: Negative.    Allergic/Immunologic: Negative.        No Known Allergies      Current Outpatient Medications:   •  apixaban (Eliquis) 5 MG tablet tablet, Take 1 tablet by mouth Every 12 (Twelve) Hours., Disp: 180 tablet, Rfl: 0  •  atorvastatin (LIPITOR) 20 MG tablet, Take 1 tablet by mouth Daily., Disp: 90 tablet, Rfl: 3  •  metoprolol tartrate (LOPRESSOR) 25 MG tablet, Take 1 tablet by mouth 2 (Two) Times a Day., Disp: 180 tablet, Rfl: 3  •  Zoster Vac Recomb Adjuvanted (Shingrix) 50 MCG/0.5ML reconstituted suspension, Inject 0.5 mL into the appropriate muscle as directed by prescriber.,  "Disp: 1 each, Rfl: 1      Objective:     Vitals:    11/30/21 1119   BP: 122/84   Pulse: 53   Weight: 86.2 kg (190 lb)   Height: 182.9 cm (72\")     Body mass index is 25.77 kg/m².    PHYSICAL EXAM:    Vitals and nursing note reviewed.   Constitutional:       Appearance: Healthy appearance.   HENT:    Mouth/Throat:      Pharynx: Oropharynx is clear.   Pulmonary:      Effort: Pulmonary effort is normal.   Cardiovascular:      Normal rate. Regular rhythm.   Edema:     Peripheral edema absent.   Skin:     General: Skin is warm.   Neurological:      Mental Status: Alert and oriented to person, place and time.   Psychiatric:         Attention and Perception: Attention and perception normal.         Mood and Affect: Mood and affect normal.             ECG 12 Lead    Date/Time: 11/30/2021 6:00 PM  Performed by: Rashi Cortez MD  Authorized by: Rashi Cortez MD   Comparison: compared with previous ECG from 12/14/2021  Similar to previous ECG  Rhythm: sinus rhythm        I reviewed her echocardiogram.  It shows normal ejection fraction.    I reviewed his heart catheterization from January.  He has stable coronary artery disease with prior stent      Assessment:       Diagnosis Plan   1. Paroxysmal atrial fibrillation (HCC)     2. Coronary artery disease involving native coronary artery of native heart without angina pectoris            Plan:       Paroxysmal atrial fibrillation.  He had one definite episode of atrial fibrillation.  He has continued to have palpitations, but they are generally very brief.  Not clear that they are having significant impact due to the brevity.  I would like to see his Holter monitor report before proceeding with any treatment.    I discussed with him ablation, potential risk and benefits.  We could not use 1C agents due to his history of coronary artery disease.    We will follow up with the patient in 3 months, review Holter monitor results.  I asked him to call if he has any " atrial fibrillation at last minutes or more.    As always, it has been a pleasure to participate in your patient's care.      Sincerely,         Rashi Cortez MD

## 2021-12-03 ENCOUNTER — TELEPHONE (OUTPATIENT)
Dept: CARDIOLOGY | Facility: CLINIC | Age: 56
End: 2021-12-03

## 2021-12-03 LAB
MAXIMAL PREDICTED HEART RATE: 165 BPM
STRESS TARGET HR: 140 BPM

## 2021-12-03 PROCEDURE — 93244 EXT ECG>48HR<7D REV&INTERPJ: CPT | Performed by: INTERNAL MEDICINE

## 2021-12-03 NOTE — TELEPHONE ENCOUNTER
Patient notified of results and verbalized understanding    Next apt 12/10/21  Xochitl Alexander RN  Triage nurse

## 2021-12-10 ENCOUNTER — OFFICE VISIT (OUTPATIENT)
Dept: CARDIOLOGY | Facility: CLINIC | Age: 56
End: 2021-12-10

## 2021-12-10 VITALS
HEIGHT: 72 IN | RESPIRATION RATE: 16 BRPM | BODY MASS INDEX: 25.19 KG/M2 | WEIGHT: 186 LBS | OXYGEN SATURATION: 98 % | HEART RATE: 49 BPM | DIASTOLIC BLOOD PRESSURE: 78 MMHG | SYSTOLIC BLOOD PRESSURE: 116 MMHG

## 2021-12-10 DIAGNOSIS — Z95.5 S/P CORONARY ARTERY STENT PLACEMENT: ICD-10-CM

## 2021-12-10 DIAGNOSIS — I48.0 PAROXYSMAL ATRIAL FIBRILLATION (HCC): Primary | ICD-10-CM

## 2021-12-10 DIAGNOSIS — E78.2 MIXED HYPERLIPIDEMIA: ICD-10-CM

## 2021-12-10 DIAGNOSIS — I25.10 CORONARY ARTERY DISEASE INVOLVING NATIVE CORONARY ARTERY OF NATIVE HEART WITHOUT ANGINA PECTORIS: ICD-10-CM

## 2021-12-10 PROCEDURE — 99214 OFFICE O/P EST MOD 30 MIN: CPT | Performed by: INTERNAL MEDICINE

## 2021-12-10 PROCEDURE — 93000 ELECTROCARDIOGRAM COMPLETE: CPT | Performed by: INTERNAL MEDICINE

## 2021-12-10 RX ORDER — METOPROLOL TARTRATE 50 MG/1
50 TABLET, FILM COATED ORAL 2 TIMES DAILY
Qty: 180 TABLET | Refills: 3 | Status: SHIPPED | OUTPATIENT
Start: 2021-12-10 | End: 2022-11-28 | Stop reason: SDUPTHER

## 2021-12-10 NOTE — PROGRESS NOTES
Date of Office Visit: 12/10/2021  Encounter Provider: Paula Hall MD  Place of Service: [facility] CARDIOLOGY  Patient Name: Abram Allen  :1965      Patient ID:  Abram Allen is a 56 y.o. male is here for  followup for PAF and cAD.         History of Present Illness    Abram Allen came in for an evaluation 2017.  He was having chest heaviness with running.  He has been a lifelong runner. He had a treadmill study done because of the chest pain.  This was done 8/3/2017 showed left bundle branch block with repolarization changes during exercise.  Because of his complaints and the ECG changes, I recommended that he undergo heart catheterization.  This was done 8/10/2017.  This showed normal left main, 10% proximal LAD, 95% mid LAD, normal left circumflex, 10% first obtuse marginal stenosis, 10% proximal RCA stenosis and 30% mid vessel stenosis.  He received a Xience Alpine drug-eluting stent, 3.5 x 18 mm the mid left anterior descending artery.  He had a treadmill stress study which was done 2017.  This showed normal heart rate response and blood pressure response exercise and good exercise tolerance.  He completed cardiac rehabilitation.     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     He was in the emergency department due to palpitations and chest pain on 2020.  He was found to be in atrial fibrillation with rapid ventricular response.  He would with a dose of IV diltiazem.  He was started on metoprolol 25 mg twice daily and a Holter was placed.  Was released from the emergency department.  His labs on that day show negative troponin, normal proBNP, glucose 105, otherwise normal CMP, normal CBC.  Bedside 2020 show LDL 86, HDL 50, total cholesterol 154.  Monitor done 2020 was abnormal showing atrial fibrillation throughout the monitor with an average heart rate of 90.    He was on apixaban and then  did go back into normal sinus rhythm and did well after this.  He then though called and said that he was having difficulty with some exertional dyspnea and mild chest pain.  He has stress nuclear perfusion study in 1/5/2021 which showed a medium size area of mild ischemia in the anterior wall.  His blood pressure was quite high during exercise.  He went on for cardiac catheterization done 1/15/2021 which showed minimal left main calcification, calcification LAD with a mid vessel stent that was widely patent and no other stenosis, patent circumflex, patent large first obtuse marginal branch and patent dominant RCA.  Medical management was recommended.    He started having atrial fibrillation on 10/4/2021 and had a significant episode that day and a couple days later.  Started on metoprolol and Eliquis and then referred him to see EP.  He still Dr. Cortez who said that he may need ablation in the future but for now would just use his metoprolol and Eliquis.  His metoprolol was increased to 50 twice daily because he was still having some breakthrough episodes and he said since that increase, he is really had no atrial fibrillation.  He denies dizziness or syncope.  He does not have exertional chest tightness or pressure.  He has no exertional dyspnea.  He has no orthopnea or PND.  He is taking his medications as directed without difficulty.    Past Medical History:   Diagnosis Date   • A-fib (HCC)    • Coronary artery disease    • COVID-19 11/2020   • Hyperlipidemia    • Left bundle branch block          Past Surgical History:   Procedure Laterality Date   • ADENOIDECTOMY     • CARDIAC CATHETERIZATION N/A 8/10/2017    by Eneida Black MD; Left main normal, LAD scattered 10% proximal stenosis, mid LAD 95% concentric stenosis near focal area of calcification, left circumflex normal, first obtuse marginal branch proximal 10% stenosis, and right coronary artery with a 10% proximal and a 30% mid stenosis   • CARDIAC  CATHETERIZATION N/A 8/10/2017    by Eneida Black MD; Xience Alpine drug-eluting stent placement 3.5×18 mm to the mid LAD     • CARDIAC CATHETERIZATION N/A 1/15/2021    Procedure: Coronary angiography;  Surgeon: Emile Krause MD;  Location: [facility] CATH INVASIVE LOCATION;  Service: Cardiovascular;  Laterality: N/A;   • CARDIAC CATHETERIZATION N/A 1/15/2021    Procedure: Left Heart Cath;  Surgeon: Emile Krause MD;  Location: [facility] CATH INVASIVE LOCATION;  Service: Cardiovascular;  Laterality: N/A;   • CARDIAC CATHETERIZATION N/A 1/15/2021    Procedure: Left ventriculography;  Surgeon: Emile Krause MD;  Location: [facility] CATH INVASIVE LOCATION;  Service: Cardiovascular;  Laterality: N/A;   • CORONARY ANGIOPLASTY WITH STENT PLACEMENT      LAD   • SHOULDER SURGERY Left        Current Outpatient Medications on File Prior to Visit   Medication Sig Dispense Refill   • apixaban (Eliquis) 5 MG tablet tablet Take 1 tablet by mouth Every 12 (Twelve) Hours. 180 tablet 0   • atorvastatin (LIPITOR) 20 MG tablet Take 1 tablet by mouth Daily. 90 tablet 3   • [DISCONTINUED] metoprolol tartrate (LOPRESSOR) 25 MG tablet Take 1 tablet by mouth 2 (Two) Times a Day. (Patient taking differently: Take 25 mg by mouth. 2 TABLETS BY MOUTH BID) 180 tablet 3   • [DISCONTINUED] Zoster Vac Recomb Adjuvanted (Shingrix) 50 MCG/0.5ML reconstituted suspension Inject 0.5 mL into the appropriate muscle as directed by prescriber. 1 each 1     No current facility-administered medications on file prior to visit.       Social History     Socioeconomic History   • Marital status:    Tobacco Use   • Smoking status: Never Smoker   • Smokeless tobacco: Never Used   Vaping Use   • Vaping Use: Never used   Substance and Sexual Activity   • Alcohol use: Yes     Comment: caffeine use: 5-6 cups daily; VERY RARELY DRINKS   • Drug use: No   • Sexual activity: Defer           ROS    Procedures    ECG 12 Lead    Date/Time: 12/10/2021  "11:16 AM  Performed by: Paula Hall MD  Authorized by: Paula Hall MD   Comparison: compared with previous ECG   Similar to previous ECG  Rhythm: sinus bradycardia  Conduction: non-specific intraventricular conduction delay    Clinical impression: abnormal EKG                Objective:      Vitals:    12/10/21 1038   BP: 116/78   BP Location: Right arm   Patient Position: Sitting   Cuff Size: Adult   Pulse: (!) 49   Resp: 16   SpO2: 98%   Weight: 84.4 kg (186 lb)   Height: 182.9 cm (72\")     Body mass index is 25.23 kg/m².    Vitals reviewed.   Constitutional:       General: Not in acute distress.     Appearance: Well-developed. Not diaphoretic.   Eyes:      General: No scleral icterus.     Conjunctiva/sclera: Conjunctivae normal.   HENT:      Head: Normocephalic and atraumatic.   Neck:      Thyroid: No thyromegaly.      Vascular: No carotid bruit or JVD.      Lymphadenopathy: No cervical adenopathy.   Pulmonary:      Effort: Pulmonary effort is normal. No respiratory distress.      Breath sounds: Normal breath sounds. No wheezing. No rhonchi. No rales.   Chest:      Chest wall: Not tender to palpatation.   Cardiovascular:      Normal rate. Regular rhythm.      Murmurs: There is no murmur.      No gallop.   Pulses:     Intact distal pulses.   Edema:     Peripheral edema absent.   Abdominal:      General: Bowel sounds are normal. There is no distension or abdominal bruit.      Palpations: Abdomen is soft. There is no abdominal mass.      Tenderness: There is no abdominal tenderness.   Musculoskeletal:         General: No deformity.      Extremities: No clubbing present.     Cervical back: Neck supple. Skin:     General: Skin is warm and dry. There is no cyanosis.      Coloration: Skin is not pale.      Findings: No rash.   Neurological:      Mental Status: Alert and oriented to person, place, and time.      Cranial Nerves: No cranial nerve deficit.   Psychiatric:         Judgment: Judgment normal. "         Lab Review:       Assessment:      Diagnosis Plan   1. Paroxysmal atrial fibrillation (HCC)  Lipid Panel    Magnesium    Comprehensive Metabolic Panel    TSH    Uric Acid    CBC & Differential   2. Coronary artery disease involving native coronary artery of native heart without angina pectoris  Lipid Panel    Magnesium    Comprehensive Metabolic Panel    TSH    Uric Acid    CBC & Differential     1. CAD, s/p LAD stent 8/10/17, with presentation of unstable angina.  No further angina  2. Hyperlipidemia, treated.  On atorvastatin.  3. LBBB on ECG.   4. High stress job, has made changes.  Walks for exercise.  5. Atrial fibrillation, paroxysmal, continue Eliquis and metoprolol tartrate 50 mg twice daily.       Plan:       No medication changes, set up testing for laboratory values, see back in 1 year.  He will call if he starts having more atrial fibrillation.

## 2022-04-22 NOTE — TELEPHONE ENCOUNTER
Good afternoon.  Patient is scheduled for Colonoscopy on 05/23/2022.  He taking Eliquis.  Can he hold prior procedure?  If so, how many days?    Thank you!  Nelly

## 2022-05-20 ENCOUNTER — ANESTHESIA EVENT (OUTPATIENT)
Dept: PERIOP | Facility: HOSPITAL | Age: 57
End: 2022-05-20

## 2022-05-23 ENCOUNTER — ANESTHESIA (OUTPATIENT)
Dept: PERIOP | Facility: HOSPITAL | Age: 57
End: 2022-05-23

## 2022-05-23 ENCOUNTER — HOSPITAL ENCOUNTER (OUTPATIENT)
Facility: HOSPITAL | Age: 57
Setting detail: HOSPITAL OUTPATIENT SURGERY
Discharge: HOME | End: 2022-05-23
Attending: INTERNAL MEDICINE | Admitting: INTERNAL MEDICINE

## 2022-05-23 VITALS
SYSTOLIC BLOOD PRESSURE: 120 MMHG | OXYGEN SATURATION: 98 % | HEIGHT: 72 IN | HEART RATE: 47 BPM | WEIGHT: 178.2 LBS | BODY MASS INDEX: 24.14 KG/M2 | RESPIRATION RATE: 14 BRPM | DIASTOLIC BLOOD PRESSURE: 73 MMHG | TEMPERATURE: 97.7 F

## 2022-05-23 DIAGNOSIS — Z86.010 PERSONAL HISTORY OF COLONIC POLYPS: ICD-10-CM

## 2022-05-23 PROCEDURE — 45380 COLONOSCOPY AND BIOPSY: CPT | Performed by: INTERNAL MEDICINE

## 2022-05-23 PROCEDURE — 25010000002 PROPOFOL 10 MG/ML EMULSION: Performed by: NURSE ANESTHETIST, CERTIFIED REGISTERED

## 2022-05-23 PROCEDURE — 88305 TISSUE EXAM BY PATHOLOGIST: CPT | Performed by: INTERNAL MEDICINE

## 2022-05-23 RX ORDER — SODIUM CHLORIDE, SODIUM LACTATE, POTASSIUM CHLORIDE, CALCIUM CHLORIDE 600; 310; 30; 20 MG/100ML; MG/100ML; MG/100ML; MG/100ML
9 INJECTION, SOLUTION INTRAVENOUS CONTINUOUS PRN
Status: DISCONTINUED | OUTPATIENT
Start: 2022-05-23 | End: 2022-05-23 | Stop reason: HOSPADM

## 2022-05-23 RX ORDER — SODIUM CHLORIDE 9 MG/ML
40 INJECTION, SOLUTION INTRAVENOUS AS NEEDED
Status: DISCONTINUED | OUTPATIENT
Start: 2022-05-23 | End: 2022-05-23 | Stop reason: HOSPADM

## 2022-05-23 RX ORDER — LIDOCAINE HYDROCHLORIDE 20 MG/ML
INJECTION, SOLUTION INFILTRATION; PERINEURAL AS NEEDED
Status: DISCONTINUED | OUTPATIENT
Start: 2022-05-23 | End: 2022-05-23 | Stop reason: SURG

## 2022-05-23 RX ORDER — ONDANSETRON 2 MG/ML
4 INJECTION INTRAMUSCULAR; INTRAVENOUS ONCE AS NEEDED
Status: DISCONTINUED | OUTPATIENT
Start: 2022-05-23 | End: 2022-05-23 | Stop reason: HOSPADM

## 2022-05-23 RX ORDER — MAGNESIUM HYDROXIDE 1200 MG/15ML
LIQUID ORAL AS NEEDED
Status: DISCONTINUED | OUTPATIENT
Start: 2022-05-23 | End: 2022-05-23 | Stop reason: HOSPADM

## 2022-05-23 RX ORDER — SODIUM CHLORIDE, SODIUM LACTATE, POTASSIUM CHLORIDE, CALCIUM CHLORIDE 600; 310; 30; 20 MG/100ML; MG/100ML; MG/100ML; MG/100ML
100 INJECTION, SOLUTION INTRAVENOUS CONTINUOUS
Status: DISCONTINUED | OUTPATIENT
Start: 2022-05-23 | End: 2022-05-23 | Stop reason: HOSPADM

## 2022-05-23 RX ORDER — SODIUM CHLORIDE 0.9 % (FLUSH) 0.9 %
10 SYRINGE (ML) INJECTION EVERY 12 HOURS SCHEDULED
Status: DISCONTINUED | OUTPATIENT
Start: 2022-05-23 | End: 2022-05-23 | Stop reason: HOSPADM

## 2022-05-23 RX ORDER — PROPOFOL 10 MG/ML
VIAL (ML) INTRAVENOUS AS NEEDED
Status: DISCONTINUED | OUTPATIENT
Start: 2022-05-23 | End: 2022-05-23 | Stop reason: SURG

## 2022-05-23 RX ORDER — LIDOCAINE HYDROCHLORIDE 10 MG/ML
0.5 INJECTION, SOLUTION EPIDURAL; INFILTRATION; INTRACAUDAL; PERINEURAL ONCE AS NEEDED
Status: DISCONTINUED | OUTPATIENT
Start: 2022-05-23 | End: 2022-05-23 | Stop reason: HOSPADM

## 2022-05-23 RX ORDER — SODIUM CHLORIDE 0.9 % (FLUSH) 0.9 %
10 SYRINGE (ML) INJECTION AS NEEDED
Status: DISCONTINUED | OUTPATIENT
Start: 2022-05-23 | End: 2022-05-23 | Stop reason: HOSPADM

## 2022-05-23 RX ADMIN — PROPOFOL 50 MG: 10 INJECTION, EMULSION INTRAVENOUS at 08:09

## 2022-05-23 RX ADMIN — PROPOFOL 50 MG: 10 INJECTION, EMULSION INTRAVENOUS at 08:02

## 2022-05-23 RX ADMIN — PROPOFOL 50 MG: 10 INJECTION, EMULSION INTRAVENOUS at 08:06

## 2022-05-23 RX ADMIN — SODIUM CHLORIDE, POTASSIUM CHLORIDE, SODIUM LACTATE AND CALCIUM CHLORIDE 9 ML/HR: 600; 310; 30; 20 INJECTION, SOLUTION INTRAVENOUS at 07:20

## 2022-05-23 RX ADMIN — LIDOCAINE HYDROCHLORIDE 100 MG: 20 INJECTION, SOLUTION INFILTRATION; PERINEURAL at 07:59

## 2022-05-23 RX ADMIN — PROPOFOL 100 MG: 10 INJECTION, EMULSION INTRAVENOUS at 07:59

## 2022-05-23 RX ADMIN — PROPOFOL 50 MG: 10 INJECTION, EMULSION INTRAVENOUS at 08:13

## 2022-05-23 NOTE — ANESTHESIA POSTPROCEDURE EVALUATION
Patient: Abram Allen    Procedure Summary     Date: 05/23/22 Room / Location: [facility] ENDOSCOPY 1 /  [facility]    Anesthesia Start: 0754 Anesthesia Stop: 0823    Procedure: COLONOSCOPY WITH POLYPECTOMY (N/A ) Diagnosis:       Personal history of colonic polyps      Colon polyp      (Personal history of colonic polyps [Z86.010])    Surgeons: Todd Craig MD Provider: Omar Cao CRNA    Anesthesia Type: MAC ASA Status: 2          Anesthesia Type: MAC    Vitals  Vitals Value Taken Time   /79 05/23/22 0850   Temp 97.7 °F (36.5 °C) 05/23/22 0828   Pulse 50 05/23/22 0850   Resp 14 05/23/22 0850   SpO2 97 % 05/23/22 0850           Post Anesthesia Care and Evaluation    Patient location during evaluation: bedside  Patient participation: complete - patient participated  Level of consciousness: awake and alert  Pain score: 2  Pain management: adequate  Airway patency: patent  Anesthetic complications: No anesthetic complications  PONV Status: none  Cardiovascular status: acceptable  Respiratory status: acceptable  Hydration status: acceptable  No anesthesia care post op

## 2022-05-23 NOTE — H&P
Patient Care Team:  Nikko Hastings MD as PCP - General (Family Medicine)  Rocael Das MD as Referring Physician (Dermatology)  Andrez Nix MD as Consulting Physician (Hematology and Oncology)    CHIEF COMPLAINT: Personal hx colon polyps    HISTORY OF PRESENT ILLNESS:  Last exam 2017    Past Medical History:   Diagnosis Date   • A-fib (HCC)    • Coronary artery disease    • COVID-19 11/2020   • Hyperlipidemia    • Left bundle branch block      Past Surgical History:   Procedure Laterality Date   • ADENOIDECTOMY     • CARDIAC CATHETERIZATION N/A 8/10/2017    by Eneida Black MD; Left main normal, LAD scattered 10% proximal stenosis, mid LAD 95% concentric stenosis near focal area of calcification, left circumflex normal, first obtuse marginal branch proximal 10% stenosis, and right coronary artery with a 10% proximal and a 30% mid stenosis   • CARDIAC CATHETERIZATION N/A 8/10/2017    by Eneida Black MD; Xience Alpine drug-eluting stent placement 3.5×18 mm to the mid LAD     • CARDIAC CATHETERIZATION N/A 1/15/2021    Procedure: Coronary angiography;  Surgeon: Emile Krause MD;  Location:  [facility];  Service: Cardiovascular;  Laterality: N/A;   • CARDIAC CATHETERIZATION N/A 1/15/2021    Procedure: Left Heart Cath;  Surgeon: Emile Krause MD;  Location:  [facility];  Service: Cardiovascular;  Laterality: N/A;   • CARDIAC CATHETERIZATION N/A 1/15/2021    Procedure: Left ventriculography;  Surgeon: Emile Krause MD;  Location:  [facility];  Service: Cardiovascular;  Laterality: N/A;   • CORONARY ANGIOPLASTY WITH STENT PLACEMENT      LAD   • SHOULDER SURGERY Left      Family History   Problem Relation Age of Onset   • Cancer Father    • Heart attack Maternal Grandmother    • Cancer Paternal Grandmother      Social History     Tobacco Use   • Smoking status: Never Smoker   • Smokeless tobacco: Never Used   Vaping Use   •  "Vaping Use: Never used   Substance Use Topics   • Alcohol use: Yes     Comment: caffeine use: 5-6 cups daily; VERY RARELY DRINKS   • Drug use: No     Medications Prior to Admission   Medication Sig Dispense Refill Last Dose   • metoprolol tartrate (LOPRESSOR) 50 MG tablet Take 1 tablet by mouth 2 (Two) Times a Day. 180 tablet 3 5/22/2022 at 1900   • apixaban (Eliquis) 5 MG tablet tablet Take 1 tablet by mouth Every 12 (Twelve) Hours. 180 tablet 3 5/20/2022 at 1600   • atorvastatin (LIPITOR) 20 MG tablet Take 1 tablet by mouth Daily. 90 tablet 3 5/21/2022 at 2200     Allergies:  Patient has no known allergies.    REVIEW OF SYSTEMS:  Please see the above history of present illness for pertinent positives and negatives.  The remainder of the patient's systems have been reviewed and are negative.     Vital Signs  Temp:  [98.5 °F (36.9 °C)] 98.5 °F (36.9 °C)  Heart Rate:  [53] 53  Resp:  [16] 16  BP: (133)/(84) 133/84    Flowsheet Rows    Flowsheet Row First Filed Value   Admission Height 182.9 cm (72\") Documented at 05/23/2022 0713   Admission Weight 80.8 kg (178 lb 3.2 oz) Documented at 05/23/2022 0713           Physical Exam:  Physical Exam   Constitutional: Patient appears well-developed and well-nourished and in no acute distress   HEENT:   Head: Normocephalic and atraumatic.   Eyes:  Pupils are equal, round, and reactive to light. EOM are intact. Sclerae are anicteric and non-injected.  Mouth and Throat: Patient has moist mucous membranes. Oropharynx is clear of any erythema or exudate.     Neck: Neck supple. No JVD present. No thyromegaly present. No lymphadenopathy present.  Cardiovascular: Regular rate, regular rhythm, S1 normal and S2 normal.  Exam reveals no gallop and no friction rub.  No murmur heard.  Pulmonary/Chest: Lungs are clear to auscultation bilaterally. No respiratory distress. No wheezes. No rhonchi. No rales.   Abdominal: Soft. Bowel sounds are normal. No distension and no mass. There is no " hepatosplenomegaly. There is no tenderness.   Musculoskeletal: Normal Muscle tone  Extremities: No edema. Pulses are palpable in all 4 extremities.  Neurological: Patient is alert and oriented to person, place, and time. Cranial nerves II-XII are grossly intact with no focal deficits.  Skin: Skin is warm. No rash noted. Nails show no clubbing.  No cyanosis or erythema.    Debilities/Disabilities Identified: None  Emotional Behavior: Appropriate     Results Review:   I reviewed the patient's new clinical results.    Lab Results (most recent)     None          Imaging Results (Most Recent)     None        reviewed    ECG/EMG Results (most recent)     None        reviewed    Assessment & Plan   Personal hx colon polyps/  colonoscopy      I discussed the patient's findings and my recommendations with patient.     Todd Craig MD  05/23/22  07:58 EDT    Time: 10 min prior to procedure.

## 2022-05-23 NOTE — ANESTHESIA PREPROCEDURE EVALUATION
Anesthesia Evaluation     NPO Solid Status: > 8 hours  NPO Liquid Status: > 8 hours           Airway   Mallampati: III  TM distance: >3 FB  Neck ROM: full  No difficulty expected  Dental      Pulmonary - negative pulmonary ROS    breath sounds clear to auscultation  Cardiovascular   Exercise tolerance: excellent (>7 METS)    Beta blocker given within 24 hours of surgery  Rhythm: regular  Rate: normal    (+) CAD, cardiac stents more than 12 months ago dysrhythmias Atrial Fib, hyperlipidemia,       Neuro/Psych- negative ROS  GI/Hepatic/Renal/Endo - negative ROS     Musculoskeletal (-) negative ROS    Abdominal    Substance History - negative use     OB/GYN negative ob/gyn ROS         Other      history of cancer                    Anesthesia Plan    ASA 2     MAC     intravenous induction     Anesthetic plan, all risks, benefits, and alternatives have been provided, discussed and informed consent has been obtained with: patient.        CODE STATUS:

## 2022-05-23 NOTE — BRIEF OP NOTE
COLONOSCOPY WITH POLYPECTOMY  Progress Note    Abram Allen  5/23/2022    Pre-op Diagnosis:   Personal history of colonic polyps [Z86.010]       Post-Op Diagnosis Codes:     * Personal history of colonic polyps [Z86.010]     * Colon polyp [K63.5]    Procedure/CPT® Codes:        Procedure(s):  COLONOSCOPY WITH POLYPECTOMY    Surgeon(s):  Todd Craig MD    Anesthesia: Monitored Anesthesia Care    Staff:   Circulator: Tanisha Marshall RN  Scrub Person: Keyonna Chacon         Estimated Blood Loss: none    Urine Voided: * No values recorded between 5/23/2022  7:55 AM and 5/23/2022  8:21 AM *    Specimens:                Specimens     ID Source Type Tests Collected By Collected At Frozen?    A Large Intestine, Sigmoid Colon Polyp · TISSUE PATHOLOGY EXAM   Todd Craig MD 5/23/22 0816     Description: Sigmoid polyp x 1                Drains: * No LDAs found *    Findings: Colon to TI good prep  Polyp-Biopsy        Complications: None          Todd Craig MD     Date: 5/23/2022  Time: 08:22 EDT

## 2022-05-24 LAB
LAB AP CASE REPORT: NORMAL
PATH REPORT.FINAL DX SPEC: NORMAL
PATH REPORT.GROSS SPEC: NORMAL

## 2022-08-09 ENCOUNTER — OFFICE VISIT (OUTPATIENT)
Dept: ORTHOPEDIC SURGERY | Facility: CLINIC | Age: 57
End: 2022-08-09

## 2022-08-09 VITALS
WEIGHT: 180 LBS | DIASTOLIC BLOOD PRESSURE: 72 MMHG | SYSTOLIC BLOOD PRESSURE: 124 MMHG | HEART RATE: 65 BPM | HEIGHT: 72 IN | BODY MASS INDEX: 24.38 KG/M2

## 2022-08-09 DIAGNOSIS — M17.11 PRIMARY OSTEOARTHRITIS OF RIGHT KNEE: Primary | ICD-10-CM

## 2022-08-09 DIAGNOSIS — G89.29 CHRONIC PAIN OF RIGHT KNEE: ICD-10-CM

## 2022-08-09 DIAGNOSIS — M25.561 CHRONIC PAIN OF RIGHT KNEE: ICD-10-CM

## 2022-08-09 PROCEDURE — 20610 DRAIN/INJ JOINT/BURSA W/O US: CPT | Performed by: NURSE PRACTITIONER

## 2022-08-09 PROCEDURE — 99214 OFFICE O/P EST MOD 30 MIN: CPT | Performed by: NURSE PRACTITIONER

## 2022-08-09 PROCEDURE — 73562 X-RAY EXAM OF KNEE 3: CPT | Performed by: NURSE PRACTITIONER

## 2022-08-09 RX ORDER — TRIAMCINOLONE ACETONIDE 40 MG/ML
80 INJECTION, SUSPENSION INTRA-ARTICULAR; INTRAMUSCULAR
Status: COMPLETED | OUTPATIENT
Start: 2022-08-09 | End: 2022-08-09

## 2022-08-09 RX ORDER — LIDOCAINE HYDROCHLORIDE 10 MG/ML
8 INJECTION, SOLUTION EPIDURAL; INFILTRATION; INTRACAUDAL; PERINEURAL
Status: COMPLETED | OUTPATIENT
Start: 2022-08-09 | End: 2022-08-09

## 2022-08-09 RX ADMIN — LIDOCAINE HYDROCHLORIDE 8 ML: 10 INJECTION, SOLUTION EPIDURAL; INFILTRATION; INTRACAUDAL; PERINEURAL at 14:32

## 2022-08-09 RX ADMIN — TRIAMCINOLONE ACETONIDE 80 MG: 40 INJECTION, SUSPENSION INTRA-ARTICULAR; INTRAMUSCULAR at 14:32

## 2022-08-09 NOTE — PROGRESS NOTES
Subjective:     Patient ID: Abram Allen is a 56 y.o. male.    Chief Complaint:  Right knee pain, new patient to examiner   History of Present Illness  Abram Allen 56-year-old male presents to clinic for evaluation of his right knee.  Is been experiencing pain on and off for the last few years localizes pain to the medial joint line.  He is experiencing some swelling.  He has had prior aspiration corticosteroid injection did note significant symptom improvement.  He is experiencing discomfort and inability to fully extend the knee and discomfort and decreased range of motion with flexion which was improved with prior steroid.  He is ambulating with a limp while working.  Rates discomfort a 4-5 out of a 10 describes mainly as aching in nature.  Positive for swelling, positive for stiffness.  Pain with standing and walking.  He is continue to exercise including ambulating 3 to 4 miles outdoors tolerating well.  He is experiencing significant pain with kneeling and squatting.  He is unable to tolerate oral NSAIDs has continued Tylenol as needed.  Is also continued with topical anti-inflammatory without significant symptom relief.  He has a physical therapist therefore has continued to work on quad strengthening hamstring strengthening calf strengthening and core strengthening exercises.  Denies any prior surgical intervention.  Denies any prior MRI or CT or x-ray imaging of the knees.  Denies other concerns present time.    Social History     Occupational History   • Not on file   Tobacco Use   • Smoking status: Never Smoker   • Smokeless tobacco: Never Used   Vaping Use   • Vaping Use: Never used   Substance and Sexual Activity   • Alcohol use: Yes     Comment: caffeine use: 5-6 cups daily; VERY RARELY DRINKS   • Drug use: No   • Sexual activity: Defer      Past Medical History:   Diagnosis Date   • A-fib (HCC)    • Coronary artery disease    • COVID-19 11/2020   • Dislocation, shoulder 5x-Bankart Repair   •  Hyperlipidemia    • Knee swelling 18-24 months   • Left bundle branch block      Past Surgical History:   Procedure Laterality Date   • ADENOIDECTOMY     • CARDIAC CATHETERIZATION N/A 08/10/2017    by Eneida Black MD; Left main normal, LAD scattered 10% proximal stenosis, mid LAD 95% concentric stenosis near focal area of calcification, left circumflex normal, first obtuse marginal branch proximal 10% stenosis, and right coronary artery with a 10% proximal and a 30% mid stenosis   • CARDIAC CATHETERIZATION N/A 08/10/2017    by Eneida Black MD; Xience Alpine drug-eluting stent placement 3.5×18 mm to the mid LAD     • CARDIAC CATHETERIZATION N/A 01/15/2021    Procedure: Coronary angiography;  Surgeon: Emile Krause MD;  Location:  [facility];  Service: Cardiovascular;  Laterality: N/A;   • CARDIAC CATHETERIZATION N/A 01/15/2021    Procedure: Left Heart Cath;  Surgeon: Emile Krause MD;  Location:  [facility];  Service: Cardiovascular;  Laterality: N/A;   • CARDIAC CATHETERIZATION N/A 01/15/2021    Procedure: Left ventriculography;  Surgeon: Emile Krause MD;  Location:  [facility];  Service: Cardiovascular;  Laterality: N/A;   • COLONOSCOPY W/ POLYPECTOMY N/A 05/23/2022    Procedure: COLONOSCOPY WITH POLYPECTOMY;  Surgeon: Todd Craig MD;  Location: [facility];  Service: Gastroenterology;  Laterality: N/A;  Sigmoid polyp x 1   • CORONARY ANGIOPLASTY WITH STENT PLACEMENT      LAD   • SHOULDER SURGERY Left        Family History   Problem Relation Age of Onset   • Cancer Father    • Heart attack Maternal Grandmother    • Cancer Paternal Grandmother                Objective:  Physical Exam    Vital signs reviewed.   General: No acute distress.  Eyes: conjunctiva clear; pupils equally round and reactive  ENT: external ears and nose atraumatic; oropharynx clear  CV: no peripheral edema  Resp: normal respiratory effort  Skin: no  "rashes or wounds; normal turgor  Psych: mood and affect appropriate; recent and remote memory intact    Vitals:    08/09/22 1411   BP: 124/72   BP Location: Right arm   Pulse: 65   Weight: 81.6 kg (180 lb)   Height: 182.9 cm (72\")         08/09/22  1411   Weight: 81.6 kg (180 lb)     Body mass index is 24.41 kg/m².      Right Knee Exam     Tenderness   The patient is experiencing tenderness in the medial joint line.    Range of Motion   Extension: 5   Flexion: 120     Tests   Marshall:  Medial - negative Lateral - negative  Varus: negative Valgus: negative  Lachman:  Anterior - 1+    Posterior - negative    Other   Erythema: absent  Sensation: normal  Pulse: present  Swelling: moderate    Comments:  Positive crepitus throughout arc of motion  Positive active patellar compression test  Quad strength 5 out of 5               Imaging:  Right Knee X-Ray  Indication: Pain    AP, Lateral, and [place] views    Findings:  No fracture  No bony lesion  Normal soft tissues  Moderate narrowing medial compartment with reactive osteophytes femoral condyle and tibial plateau medially, moderate patellofemoral narrowing with reactive osteophytes noted inferior and superior patellar pole overall varus alignment appreciated    No prior studies were available for comparison.    Assessment:        1. Primary osteoarthritis of right knee    2. Chronic pain of right knee           Plan:  Large Joint Arthrocentesis: R knee  Date/Time: 8/9/2022 2:32 PM  Consent given by: patient  Site marked: site marked  Timeout: Immediately prior to procedure a time out was called to verify the correct patient, procedure, equipment, support staff and site/side marked as required   Supporting Documentation  Indications: pain   Procedure Details  Location: knee - R knee  Preparation: Patient was prepped and draped in the usual sterile fashion  Needle size: 22 G  Approach: superior (lateral)  Medications administered: 80 mg triamcinolone acetonide 40 MG/ML; " 8 mL lidocaine PF 1% 1 %  Patient tolerance: patient tolerated the procedure well with no immediate complications          1. Discussed treatment options with patient.  Wishes to proceed with corticosteroid injection right knee.  Discussed application of ice at injection site this evening.  Discussed avoiding new onset of activity the next 48 hours.  We will submit for viscosupplementation injections.  Also discussed the option of total knee arthroplasty.  At this time we will hold off on referral to discuss total knee surgery.  Again we will proceed with submission for viscosupplementation injections plan to see him back in clinic once authorized to start series.  Continue strengthening exercises, continue walking for exercise.  All questions answered.  Orders:  Orders Placed This Encounter   Procedures   • Large Joint Arthrocentesis: R knee   • XR Knee 3 View Right   • Visco Treatment     No orders of the defined types were placed in this encounter.          I ordered and reviewed the HENRI today.       Dragon Dictation utilized.

## 2022-10-28 DIAGNOSIS — M17.11 PRIMARY OSTEOARTHRITIS OF RIGHT KNEE: Primary | ICD-10-CM

## 2022-11-07 RX ORDER — ATORVASTATIN CALCIUM 20 MG/1
20 TABLET, FILM COATED ORAL DAILY
Qty: 90 TABLET | Refills: 0 | Status: SHIPPED | OUTPATIENT
Start: 2022-11-07 | End: 2022-12-16

## 2022-11-07 NOTE — TELEPHONE ENCOUNTER
Failed protocol    Next OV-12/16/22-RM  Last OV-12/10/21-RM    Plan:       No medication changes, set up testing for laboratory values, see back in 1 year.  He will call if he starts having more atrial fibrillation.    No labs since 12/2021.  Hasn't seen PCP since 2019    Kalpesh

## 2022-11-09 DIAGNOSIS — I25.10 CORONARY ARTERY DISEASE INVOLVING NATIVE CORONARY ARTERY OF NATIVE HEART WITHOUT ANGINA PECTORIS: Primary | ICD-10-CM

## 2022-11-09 DIAGNOSIS — E78.5 HYPERLIPIDEMIA, UNSPECIFIED HYPERLIPIDEMIA TYPE: ICD-10-CM

## 2022-11-11 ENCOUNTER — TELEPHONE (OUTPATIENT)
Dept: CARDIOLOGY | Facility: CLINIC | Age: 57
End: 2022-11-11

## 2022-11-11 ENCOUNTER — LAB (OUTPATIENT)
Dept: LAB | Facility: HOSPITAL | Age: 57
End: 2022-11-11

## 2022-11-11 DIAGNOSIS — I25.10 CORONARY ARTERY DISEASE INVOLVING NATIVE CORONARY ARTERY OF NATIVE HEART WITHOUT ANGINA PECTORIS: ICD-10-CM

## 2022-11-11 DIAGNOSIS — E78.5 HYPERLIPIDEMIA, UNSPECIFIED HYPERLIPIDEMIA TYPE: ICD-10-CM

## 2022-11-11 LAB
ALBUMIN SERPL-MCNC: 4.6 G/DL (ref 3.5–5.2)
ALBUMIN/GLOB SERPL: 2.1 G/DL
ALP SERPL-CCNC: 58 U/L (ref 39–117)
ALT SERPL W P-5'-P-CCNC: 24 U/L (ref 1–41)
ANION GAP SERPL CALCULATED.3IONS-SCNC: 7.9 MMOL/L (ref 5–15)
AST SERPL-CCNC: 24 U/L (ref 1–40)
BASOPHILS # BLD AUTO: 0.05 10*3/MM3 (ref 0–0.2)
BASOPHILS NFR BLD AUTO: 0.8 % (ref 0–1.5)
BILIRUB SERPL-MCNC: 0.4 MG/DL (ref 0–1.2)
BUN SERPL-MCNC: 14 MG/DL (ref 6–20)
BUN/CREAT SERPL: 15.7 (ref 7–25)
CALCIUM SPEC-SCNC: 9.6 MG/DL (ref 8.6–10.5)
CHLORIDE SERPL-SCNC: 106 MMOL/L (ref 98–107)
CHOLEST SERPL-MCNC: 182 MG/DL (ref 0–200)
CO2 SERPL-SCNC: 26.1 MMOL/L (ref 22–29)
CREAT SERPL-MCNC: 0.89 MG/DL (ref 0.76–1.27)
DEPRECATED RDW RBC AUTO: 38 FL (ref 37–54)
EGFRCR SERPLBLD CKD-EPI 2021: 100.6 ML/MIN/1.73
EOSINOPHIL # BLD AUTO: 0.13 10*3/MM3 (ref 0–0.4)
EOSINOPHIL NFR BLD AUTO: 2 % (ref 0.3–6.2)
ERYTHROCYTE [DISTWIDTH] IN BLOOD BY AUTOMATED COUNT: 11.7 % (ref 12.3–15.4)
GLOBULIN UR ELPH-MCNC: 2.2 GM/DL
GLUCOSE SERPL-MCNC: 111 MG/DL (ref 65–99)
HCT VFR BLD AUTO: 41.5 % (ref 37.5–51)
HDLC SERPL-MCNC: 48 MG/DL (ref 40–60)
HGB BLD-MCNC: 14.3 G/DL (ref 13–17.7)
IMM GRANULOCYTES # BLD AUTO: 0.06 10*3/MM3 (ref 0–0.05)
IMM GRANULOCYTES NFR BLD AUTO: 0.9 % (ref 0–0.5)
LDLC SERPL CALC-MCNC: 115 MG/DL (ref 0–100)
LDLC/HDLC SERPL: 2.35 {RATIO}
LYMPHOCYTES # BLD AUTO: 1.98 10*3/MM3 (ref 0.7–3.1)
LYMPHOCYTES NFR BLD AUTO: 30.5 % (ref 19.6–45.3)
MAGNESIUM SERPL-MCNC: 2.5 MG/DL (ref 1.6–2.6)
MCH RBC QN AUTO: 30.1 PG (ref 26.6–33)
MCHC RBC AUTO-ENTMCNC: 34.5 G/DL (ref 31.5–35.7)
MCV RBC AUTO: 87.4 FL (ref 79–97)
MONOCYTES # BLD AUTO: 0.71 10*3/MM3 (ref 0.1–0.9)
MONOCYTES NFR BLD AUTO: 10.9 % (ref 5–12)
NEUTROPHILS NFR BLD AUTO: 3.56 10*3/MM3 (ref 1.7–7)
NEUTROPHILS NFR BLD AUTO: 54.9 % (ref 42.7–76)
NRBC BLD AUTO-RTO: 0 /100 WBC (ref 0–0.2)
PLATELET # BLD AUTO: 217 10*3/MM3 (ref 140–450)
PMV BLD AUTO: 11.8 FL (ref 6–12)
POTASSIUM SERPL-SCNC: 4.5 MMOL/L (ref 3.5–5.2)
PROT SERPL-MCNC: 6.8 G/DL (ref 6–8.5)
RBC # BLD AUTO: 4.75 10*6/MM3 (ref 4.14–5.8)
SODIUM SERPL-SCNC: 140 MMOL/L (ref 136–145)
TRIGL SERPL-MCNC: 107 MG/DL (ref 0–150)
TSH SERPL DL<=0.05 MIU/L-ACNC: 3.12 UIU/ML (ref 0.27–4.2)
VLDLC SERPL-MCNC: 19 MG/DL (ref 5–40)
WBC NRBC COR # BLD: 6.49 10*3/MM3 (ref 3.4–10.8)

## 2022-11-11 PROCEDURE — 80050 GENERAL HEALTH PANEL: CPT

## 2022-11-11 PROCEDURE — 83735 ASSAY OF MAGNESIUM: CPT

## 2022-11-11 PROCEDURE — 80061 LIPID PANEL: CPT

## 2022-11-11 PROCEDURE — 36415 COLL VENOUS BLD VENIPUNCTURE: CPT

## 2022-11-11 NOTE — TELEPHONE ENCOUNTER
----- Message from LISA Muñoz sent at 11/11/2022 11:44 AM EST -----  Please inform patient labs overall look good.  Thyroid function is normal.  Magnesium is normal.  Kidney function and liver enzymes are normal.  Glucose is mildly elevated but this is not completely new for patient.  LDL is a little high compared to last year.  At this time, he will keep his appointment next month with Dr. Hall

## 2022-11-28 RX ORDER — METOPROLOL TARTRATE 50 MG/1
50 TABLET, FILM COATED ORAL 2 TIMES DAILY
Qty: 180 TABLET | Refills: 0 | Status: SHIPPED | OUTPATIENT
Start: 2022-11-28 | End: 2023-02-24 | Stop reason: SDUPTHER

## 2022-11-28 NOTE — TELEPHONE ENCOUNTER
Next OV-12/16/22-RM  Last OV-12/10/21-RM    Plan:       No medication changes, set up testing for laboratory values, see back in 1 year.  He will call if he starts having more atrial fibrillation.

## 2022-12-16 ENCOUNTER — OFFICE VISIT (OUTPATIENT)
Dept: CARDIOLOGY | Facility: CLINIC | Age: 57
End: 2022-12-16

## 2022-12-16 VITALS
BODY MASS INDEX: 25.73 KG/M2 | SYSTOLIC BLOOD PRESSURE: 128 MMHG | DIASTOLIC BLOOD PRESSURE: 68 MMHG | WEIGHT: 190 LBS | HEIGHT: 72 IN | HEART RATE: 48 BPM

## 2022-12-16 DIAGNOSIS — Z95.5 S/P CORONARY ARTERY STENT PLACEMENT: ICD-10-CM

## 2022-12-16 DIAGNOSIS — E78.2 MIXED HYPERLIPIDEMIA: ICD-10-CM

## 2022-12-16 DIAGNOSIS — I25.10 CORONARY ARTERY DISEASE INVOLVING NATIVE CORONARY ARTERY OF NATIVE HEART WITHOUT ANGINA PECTORIS: Primary | ICD-10-CM

## 2022-12-16 DIAGNOSIS — I44.7 LBBB (LEFT BUNDLE BRANCH BLOCK): ICD-10-CM

## 2022-12-16 DIAGNOSIS — I48.0 PAROXYSMAL ATRIAL FIBRILLATION: ICD-10-CM

## 2022-12-16 PROCEDURE — 93000 ELECTROCARDIOGRAM COMPLETE: CPT | Performed by: INTERNAL MEDICINE

## 2022-12-16 PROCEDURE — 99214 OFFICE O/P EST MOD 30 MIN: CPT | Performed by: INTERNAL MEDICINE

## 2022-12-16 RX ORDER — ATORVASTATIN CALCIUM 40 MG/1
40 TABLET, FILM COATED ORAL NIGHTLY
Qty: 90 TABLET | Refills: 3 | Status: SHIPPED | OUTPATIENT
Start: 2022-12-16

## 2022-12-16 NOTE — PROGRESS NOTES
Date of Office Visit: 2022  Encounter Provider: Paula Hall MD  Place of Service: [facility] CARDIOLOG[facility]  Patient Name: Abram Allen  :1965      Patient ID:  Abram Allen is a 57 y.o. male is here for  followup for CAD.         History of Present Illness     Abram Allen came in for an evaluation 2017.  He was having chest heaviness with running.  He has been a lifelong runner. He had a treadmill study done because of the chest pain.  This was done 8/3/2017 showed left bundle branch block with repolarization changes during exercise.  Because of his complaints and the ECG changes, I recommended that he undergo heart catheterization.  This was done 8/10/2017.  This showed normal left main, 10% proximal LAD, 95% mid LAD, normal left circumflex, 10% first obtuse marginal stenosis, 10% proximal RCA stenosis and 30% mid vessel stenosis.  He received a Xience Alpine drug-eluting stent, 3.5 x 18 mm the mid left anterior descending artery.  He had a treadmill stress study which was done 2017.  This showed normal heart rate response and blood pressure response exercise and good exercise tolerance.  He completed cardiac rehabilitation.     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     He was in the emergency department due to palpitations and chest pain on 2020.  He was found to be in atrial fibrillation with rapid ventricular response.  He would with a dose of IV diltiazem.  He was started on metoprolol 25 mg twice daily and a Holter was placed.  Was released from the emergency department.  His labs on that day show negative troponin, normal proBNP, glucose 105, otherwise normal CMP, normal CBC.  Bedside 2020 show LDL 86, HDL 50, total cholesterol 154.  Monitor done 2020 was abnormal showing atrial fibrillation throughout the monitor with an average heart rate of 90.    He was on apixaban and then did go  back into normal sinus rhythm and did well after this.  He then though called and said that he was having difficulty with some exertional dyspnea and mild chest pain.  He has stress nuclear perfusion study in 1/5/2021 which showed a medium size area of mild ischemia in the anterior wall.  His blood pressure was quite high during exercise.  He went on for cardiac catheterization done 1/15/2021 which showed minimal left main calcification, calcification LAD with a mid vessel stent that was widely patent and no other stenosis, patent circumflex, patent large first obtuse marginal branch and patent dominant RCA.  Medical management was recommended.     He started having atrial fibrillation on 10/4/2021 and had a significant episode that day and a couple days later.    He was started on metoprolol and Eliquis and then referred him to see EP.  He saw Dr. Cortez who said that he may need ablation in the future but for now would just use his metoprolol and Eliquis.  His metoprolol was increased to 50 twice daily because he was still having some breakthrough episodes and he said since that increase, he is really had no atrial fibrillation.      Labs 11/1/2022 show glucose 111, otherwise normal CMP, total cholesterol 182, HDL 40, , VLDL 19, triglycerides 107, normal CBC.  He has 2 short-lived episodes of atrial fibrillation week, lasting seconds.  He has had 2 longer episodes over the entire year, lasted 2 hours and 110 hours.  He has had no dizziness or syncope.  He has no anginal chest pain.  He has no orthopnea or PND.  He has had no fevers, chills.  He is taking his medications as directed without difficulty.  He is walking for exercise.    Past Medical History:   Diagnosis Date   • A-fib ([facility])    • Coronary artery disease    • COVID-19 11/2020   • Dislocation, shoulder 5x-Bankart Repair   • Hyperlipidemia    • Knee swelling 18-24 months   • Left bundle branch block          Past Surgical History:   Procedure  Laterality Date   • ADENOIDECTOMY     • CARDIAC CATHETERIZATION N/A 08/10/2017    by Eneida Black MD; Left main normal, LAD scattered 10% proximal stenosis, mid LAD 95% concentric stenosis near focal area of calcification, left circumflex normal, first obtuse marginal branch proximal 10% stenosis, and right coronary artery with a 10% proximal and a 30% mid stenosis   • CARDIAC CATHETERIZATION N/A 08/10/2017    by Eneida Black MD; Xience Alpine drug-eluting stent placement 3.5×18 mm to the mid LAD     • CARDIAC CATHETERIZATION N/A 01/15/2021    Procedure: Coronary angiography;  Surgeon: Emile Krause MD;  Location:  [facility];  Service: Cardiovascular;  Laterality: N/A;   • CARDIAC CATHETERIZATION N/A 01/15/2021    Procedure: Left Heart Cath;  Surgeon: Emile Krause MD;  Location:  [facility];  Service: Cardiovascular;  Laterality: N/A;   • CARDIAC CATHETERIZATION N/A 01/15/2021    Procedure: Left ventriculography;  Surgeon: Emile Krause MD;  Location:  [facility];  Service: Cardiovascular;  Laterality: N/A;   • COLONOSCOPY W/ POLYPECTOMY N/A 05/23/2022    Procedure: COLONOSCOPY WITH POLYPECTOMY;  Surgeon: Todd Craig MD;  Location: [facility];  Service: Gastroenterology;  Laterality: N/A;  Sigmoid polyp x 1   • CORONARY ANGIOPLASTY WITH STENT PLACEMENT      LAD   • SHOULDER SURGERY Left        Current Outpatient Medications on File Prior to Visit   Medication Sig Dispense Refill   • apixaban (Eliquis) 5 MG tablet tablet Take 1 tablet by mouth Every 12 (Twelve) Hours. 180 tablet 3   • atorvastatin (LIPITOR) 20 MG tablet Take 1 tablet by mouth Daily. 90 tablet 0   • metoprolol tartrate (LOPRESSOR) 50 MG tablet Take 1 tablet by mouth 2 (Two) Times a Day. 180 tablet 0     No current facility-administered medications on file prior to visit.       Social History     Socioeconomic History   • Marital status:    Tobacco  "Use   • Smoking status: Never   • Smokeless tobacco: Never   Vaping Use   • Vaping Use: Never used   Substance and Sexual Activity   • Alcohol use: Yes     Comment: caffeine use: 5-6 cups daily; VERY RARELY DRINKS   • Drug use: No   • Sexual activity: Defer           ROS    Procedures    ECG 12 Lead    Date/Time: 12/16/2022 11:27 AM  Performed by: Paula Hall MD  Authorized by: Paula Hall MD   Comparison: compared with previous ECG   Similar to previous ECG  Rhythm: sinus rhythm  Conduction: left bundle branch block    Clinical impression: abnormal EKG                Objective:      Vitals:    12/16/22 1102   BP: 128/68   Pulse: (!) 48   Weight: 86.2 kg (190 lb)   Height: 182.9 cm (72\")     Body mass index is 25.77 kg/m².    Vitals reviewed.   Constitutional:       General: Not in acute distress.     Appearance: Well-developed. Not diaphoretic.   Eyes:      General: No scleral icterus.     Conjunctiva/sclera: Conjunctivae normal.   HENT:      Head: Normocephalic and atraumatic.   Neck:      Thyroid: No thyromegaly.      Vascular: No carotid bruit or JVD.      Lymphadenopathy: No cervical adenopathy.   Pulmonary:      Effort: Pulmonary effort is normal. No respiratory distress.      Breath sounds: Normal breath sounds. No wheezing. No rhonchi. No rales.   Chest:      Chest wall: Not tender to palpatation.   Cardiovascular:      Normal rate. Regular rhythm.      Murmurs: There is no murmur.      No gallop.   Pulses:     Intact distal pulses.   Edema:     Peripheral edema absent.   Abdominal:      General: Bowel sounds are normal. There is no distension or abdominal bruit.      Palpations: Abdomen is soft. There is no abdominal mass.      Tenderness: There is no abdominal tenderness.   Musculoskeletal:         General: No deformity.      Extremities: No clubbing present.     Cervical back: Neck supple. Skin:     General: Skin is warm and dry. There is no cyanosis.      Coloration: Skin is not pale. "      Findings: No rash.   Neurological:      Mental Status: Alert and oriented to person, place, and time.      Cranial Nerves: No cranial nerve deficit.   Psychiatric:         Judgment: Judgment normal.         Lab Review:       Assessment:      Diagnosis Plan   1. Coronary artery disease involving native coronary artery of native heart without angina pectoris        2. Mixed hyperlipidemia        3. Paroxysmal atrial fibrillation (HCC)        4. S/P coronary artery stent placement        5. LBBB (left bundle branch block)          1. CAD, s/p LAD stent 8/10/17, with presentation of unstable angina.  No further angina  2. Hyperlipidemia, treated.  On atorvastatin.  3. LBBB on ECG.   4. High stress job, has made changes.  Walks for exercise.  5. Atrial fibrillation, paroxysmal, continue Eliquis and metoprolol tartrate 50 mg twice daily.  He may take an extra metoprolol for atrial fibrillation that lasts more than 30 minutes.  No other testing at this time but recommended that he consider smart technology to monitor his rhythm because at times he is not sure he is in A. fib.        Plan:       See back in 1 year, no other testing.  He probably needs an increase in atorvastatin to 40 mg daily because his LDL still slightly high.  We will call him to let him know this.

## 2023-02-20 NOTE — TELEPHONE ENCOUNTER
Rx Refill Note  Requested Prescriptions     Pending Prescriptions Disp Refills   • apixaban (Eliquis) 5 MG tablet tablet 180 tablet 3     Sig: Take 1 tablet by mouth Every 12 (Twelve) Hours.      Last office visit with prescribing clinician: 12/16/2022    Last telemedicine visit with prescribing clinician: Visit date not found   Next office visit with prescribing clinician: Visit date not found                         Would you like a call back once the refill request has been completed: [] Yes [] No    If the office needs to give you a call back, can they leave a voicemail: [] Yes [] No    Amberly Pickens MA  02/20/23, 08:58 EST

## 2023-02-24 RX ORDER — METOPROLOL TARTRATE 50 MG/1
50 TABLET, FILM COATED ORAL 2 TIMES DAILY
Qty: 180 TABLET | Refills: 0 | Status: SHIPPED | OUTPATIENT
Start: 2023-02-24

## 2023-03-21 RX ORDER — ATORVASTATIN CALCIUM 20 MG/1
20 TABLET, FILM COATED ORAL DAILY
Qty: 90 TABLET | Refills: 2 | OUTPATIENT
Start: 2023-03-21

## 2023-03-21 NOTE — TELEPHONE ENCOUNTER
Last OV 12/16/22.  Next OV 12/22/23.  Labs 11/11/22.  [facility] [place]    Dose change 12/16/22

## 2023-04-17 RX ORDER — ATORVASTATIN CALCIUM 40 MG/1
40 TABLET, FILM COATED ORAL NIGHTLY
Qty: 90 TABLET | Refills: 3 | Status: SHIPPED | OUTPATIENT
Start: 2023-04-17

## 2023-05-23 RX ORDER — METOPROLOL TARTRATE 50 MG/1
50 TABLET, FILM COATED ORAL 2 TIMES DAILY
Qty: 180 TABLET | Refills: 1 | Status: SHIPPED | OUTPATIENT
Start: 2023-05-23

## 2023-05-23 NOTE — TELEPHONE ENCOUNTER
NOV-12/22/23-RM  LOV-12/16/22-RM    Plan:       See back in 1 year, no other testing.  He probably needs an increase in atorvastatin to 40 mg daily because his LDL still slightly high.  We will call him to let him know this.

## 2023-08-16 ENCOUNTER — OFFICE VISIT (OUTPATIENT)
Dept: ORTHOPEDIC SURGERY | Facility: CLINIC | Age: 58
End: 2023-08-16
Payer: COMMERCIAL

## 2023-08-16 VITALS
BODY MASS INDEX: 25.06 KG/M2 | HEART RATE: 64 BPM | HEIGHT: 72 IN | DIASTOLIC BLOOD PRESSURE: 75 MMHG | SYSTOLIC BLOOD PRESSURE: 126 MMHG | WEIGHT: 185 LBS

## 2023-08-16 DIAGNOSIS — M17.12 PRIMARY OSTEOARTHRITIS OF LEFT KNEE: ICD-10-CM

## 2023-08-16 DIAGNOSIS — M17.11 PRIMARY OSTEOARTHRITIS OF RIGHT KNEE: Primary | ICD-10-CM

## 2023-08-16 RX ADMIN — TRIAMCINOLONE ACETONIDE 80 MG: 40 INJECTION, SUSPENSION INTRA-ARTICULAR; INTRAMUSCULAR at 16:09

## 2023-08-16 RX ADMIN — LIDOCAINE HYDROCHLORIDE 8 ML: 10 INJECTION, SOLUTION EPIDURAL; INFILTRATION; INTRACAUDAL; PERINEURAL at 16:09

## 2023-08-16 NOTE — PROGRESS NOTES
Subjective:     Patient ID: Abram Allen is a 57 y.o. male.    Chief Complaint:  Bilateral knee pain -left knee new issue to examiner   Lower Extremity Issue    Abram Allen returns to clinic for follow-up bilateral knees.  He is experiencing pain at the right knee along the medial compartment as well as the anterior aspect of the knee does report decreased range of motion with extension of the right knee.  Has received corticosteroid injection in the past rates discomfort a 4-5 out of a 10 mainly aching in nature.  Positive for swelling positive for stiffness.  Pain with standing for long distances and ambulatory activities.  Unable to tolerate oral NSAIDs as continue Tylenol as needed.  Has tried topical anti-inflammatory without any significant symptom resolution has continued to physical therapy strengthening exercises bilateral lower extremities.  Denies any recent MRI, CT or x-ray imaging.     Social History     Occupational History    Not on file   Tobacco Use    Smoking status: Never    Smokeless tobacco: Never   Vaping Use    Vaping Use: Never used   Substance and Sexual Activity    Alcohol use: Yes     Comment: caffeine use: 5-6 cups daily; VERY RARELY DRINKS    Drug use: No    Sexual activity: Defer      Past Medical History:   Diagnosis Date    A-fib     Coronary artery disease     COVID-19 11/2020    Dislocation, shoulder 5x-Bankart Repair    Hyperlipidemia     Knee swelling 18-24 months    Left bundle branch block      Past Surgical History:   Procedure Laterality Date    ADENOIDECTOMY      CARDIAC CATHETERIZATION N/A 08/10/2017    by Eneida Black MD; Left main normal, LAD scattered 10% proximal stenosis, mid LAD 95% concentric stenosis near focal area of calcification, left circumflex normal, first obtuse marginal branch proximal 10% stenosis, and right coronary artery with a 10% proximal and a 30% mid stenosis    CARDIAC CATHETERIZATION N/A 08/10/2017    by Eneida Black MD; Chelsea Rawls  "drug-eluting stent placement 3.5x18 mm to the mid LAD      CARDIAC CATHETERIZATION N/A 01/15/2021    Procedure: Coronary angiography;  Surgeon: Emile Krause MD;  Location:  [facility];  Service: Cardiovascular;  Laterality: N/A;    CARDIAC CATHETERIZATION N/A 01/15/2021    Procedure: Left Heart Cath;  Surgeon: Emile Krause MD;  Location:  [facility];  Service: Cardiovascular;  Laterality: N/A;    CARDIAC CATHETERIZATION N/A 01/15/2021    Procedure: Left ventriculography;  Surgeon: Emile Krause MD;  Location:  [facility];  Service: Cardiovascular;  Laterality: N/A;    COLONOSCOPY W/ POLYPECTOMY N/A 05/23/2022    Procedure: COLONOSCOPY WITH POLYPECTOMY;  Surgeon: Todd Craig MD;  Location: [facility];  Service: Gastroenterology;  Laterality: N/A;  Sigmoid polyp x 1    CORONARY ANGIOPLASTY WITH STENT PLACEMENT      LAD    SHOULDER SURGERY Left        Family History   Problem Relation Age of Onset    Cancer Father     Heart attack Maternal Grandmother     Cancer Paternal Grandmother                Objective:  Physical Exam    Vital signs reviewed.   General: No acute distress.  Eyes: conjunctiva clear; pupils equally round and reactive  ENT: external ears and nose atraumatic; oropharynx clear  CV: no peripheral edema  Resp: normal respiratory effort  Skin: no rashes or wounds; normal turgor  Psych: mood and affect appropriate; recent and remote memory intact    Vitals:    08/16/23 1502   BP: 126/75   BP Location: Left arm   Pulse: 64   Weight: 83.9 kg (185 lb)   Height: 182.9 cm (72\")         08/16/23  1502   Weight: 83.9 kg (185 lb)     Body mass index is 25.09 kg/mý.      Ortho Exam     Bilateral knees examined  Right knee range of motion 5-120  Left knee range of motion 0-125  Stable to varus and valgus stress  1+ anterior Lachman  Positive tenderness medial compartment right knee  Quad strength 5 out of 5  Negative medial " lateral Marshall's exam  Negative anterior posterior drawer exam  Moderate swelling right knee only  Positive active patellar compression test  Positive crepitus throughout arc of motion  Positive sensation light touch of distributions bilateral lower extremities    Imaging:  Bilateral Knee X-Ray  Indication: Pain    AP, Lateral, and Naranja views    Findings:  No fracture  No bony lesion  Normal soft tissues  Right knee moderate medial compartment narrowing with reactive osteophytes medial compartment, patellofemoral narrowing bilaterally with reactive osteophytes noted    prior studies were available for comparison, right knee only  Assessment:        1. Primary osteoarthritis of right knee    2. Primary osteoarthritis of left knee           Plan:    - Large Joint Arthrocentesis: R knee on 8/16/2023 4:09 PM  Indications: pain  Details: 18 G needle, superolateral approach  Medications: 80 mg triamcinolone acetonide 40 MG/ML; 8 mL lidocaine PF 1% 1 %  Aspirate: 1 mL serous  Outcome: tolerated well, no immediate complications  Procedure, treatment alternatives, risks and benefits explained, specific risks discussed. Consent was given by the patient. Immediately prior to procedure a time out was called to verify the correct patient, procedure, equipment, support staff and site/side marked as required. Patient was prepped and draped in the usual sterile fashion.       Discussed plan of care with patient.  We did discuss proceeding with aspiration corticosteroid injection right knee only.  We will also provide Medrol Dosepak.  Discussed application of ice injection site this evening may take 7 days before he begins to experience significant symptom improvement.  Encouraged to call with any questions or concerns gladly see him back in clinic as needed.  All questions answered.  Orders:  Orders Placed This Encounter   Procedures    - Large Joint Arthrocentesis: R knee    XR Knee 3 View Bilateral     No orders of the  defined types were placed in this encounter.          Dragon dictation utilized

## 2023-08-18 RX ORDER — METHYLPREDNISOLONE 4 MG/1
TABLET ORAL
Qty: 21 TABLET | Refills: 0 | Status: SHIPPED | OUTPATIENT
Start: 2023-08-18

## 2023-08-18 RX ORDER — TRIAMCINOLONE ACETONIDE 40 MG/ML
80 INJECTION, SUSPENSION INTRA-ARTICULAR; INTRAMUSCULAR
Status: COMPLETED | OUTPATIENT
Start: 2023-08-16 | End: 2023-08-16

## 2023-08-18 RX ORDER — LIDOCAINE HYDROCHLORIDE 10 MG/ML
8 INJECTION, SOLUTION EPIDURAL; INFILTRATION; INTRACAUDAL; PERINEURAL
Status: COMPLETED | OUTPATIENT
Start: 2023-08-16 | End: 2023-08-16

## 2023-11-14 RX ORDER — METOPROLOL TARTRATE 50 MG/1
50 TABLET, FILM COATED ORAL 2 TIMES DAILY
Qty: 180 TABLET | Refills: 0 | Status: SHIPPED | OUTPATIENT
Start: 2023-11-14

## 2023-12-22 ENCOUNTER — OFFICE VISIT (OUTPATIENT)
Dept: CARDIOLOGY | Facility: CLINIC | Age: 58
End: 2023-12-22
Payer: COMMERCIAL

## 2023-12-22 VITALS
HEIGHT: 72 IN | BODY MASS INDEX: 26.75 KG/M2 | DIASTOLIC BLOOD PRESSURE: 72 MMHG | SYSTOLIC BLOOD PRESSURE: 125 MMHG | WEIGHT: 197.5 LBS | HEART RATE: 65 BPM

## 2023-12-22 DIAGNOSIS — I25.10 CORONARY ARTERY DISEASE INVOLVING NATIVE CORONARY ARTERY OF NATIVE HEART WITHOUT ANGINA PECTORIS: Primary | ICD-10-CM

## 2023-12-22 DIAGNOSIS — Z12.5 SCREENING PSA (PROSTATE SPECIFIC ANTIGEN): ICD-10-CM

## 2023-12-22 DIAGNOSIS — I44.7 LBBB (LEFT BUNDLE BRANCH BLOCK): ICD-10-CM

## 2023-12-22 DIAGNOSIS — I48.0 PAROXYSMAL ATRIAL FIBRILLATION: ICD-10-CM

## 2023-12-22 DIAGNOSIS — Z95.5 S/P CORONARY ARTERY STENT PLACEMENT: ICD-10-CM

## 2023-12-22 DIAGNOSIS — E78.2 MIXED HYPERLIPIDEMIA: ICD-10-CM

## 2023-12-22 PROCEDURE — 93000 ELECTROCARDIOGRAM COMPLETE: CPT | Performed by: INTERNAL MEDICINE

## 2023-12-22 PROCEDURE — 99214 OFFICE O/P EST MOD 30 MIN: CPT | Performed by: INTERNAL MEDICINE

## 2023-12-22 NOTE — PROGRESS NOTES
Date of Office Visit: 2022  Encounter Provider: Paula Hall MD  Place of Service: [facility]  Patient Name: Abram Allen  :1965      Patient ID:  Abram Allen is a 58 y.o. male is here for  followup for CAD.         History of Present Illness     Abram Allen came in for an evaluation 2017.  He was having chest heaviness with running.  He has been a lifelong runner. He had a treadmill study done because of the chest pain.  This was done 8/3/2017 showed left bundle branch block with repolarization changes during exercise.  Because of his complaints and the ECG changes, I recommended that he undergo heart catheterization.  This was done 8/10/2017.  This showed normal left main, 10% proximal LAD, 95% mid LAD, normal left circumflex, 10% first obtuse marginal stenosis, 10% proximal RCA stenosis and 30% mid vessel stenosis.  He received a Xience Alpine drug-eluting stent, 3.5 x 18 mm the mid left anterior descending artery.  He had a treadmill stress study which was done 2017.  This showed normal heart rate response and blood pressure response exercise and good exercise tolerance.  He completed cardiac rehabilitation.     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     He was in the emergency department due to palpitations and chest pain on 2020.  He was found to be in atrial fibrillation with rapid ventricular response.  He would with a dose of IV diltiazem.  He was started on metoprolol 25 mg twice daily and a Holter was placed.  Was released from the emergency department.  His labs on that day show negative troponin, normal proBNP, glucose 105, otherwise normal CMP, normal CBC.  Bedside 2020 show LDL 86, HDL 50, total cholesterol 154.  Monitor done 2020 was abnormal showing atrial fibrillation throughout the monitor with an average heart rate of 90.    He was on apixaban and then did go  back into normal sinus rhythm and did well after this.  He then though called and said that he was having difficulty with some exertional dyspnea and mild chest pain.  He has stress nuclear perfusion study in 1/5/2021 which showed a medium size area of mild ischemia in the anterior wall.  His blood pressure was quite high during exercise.  He went on for cardiac catheterization done 1/15/2021 which showed minimal left main calcification, calcification LAD with a mid vessel stent that was widely patent and no other stenosis, patent circumflex, patent large first obtuse marginal branch and patent dominant RCA.  Medical management was recommended.     He started having atrial fibrillation on 10/4/2021 and had a significant episode that day and a couple days later.    He was started on metoprolol and Eliquis and then referred him to see EP.  He saw Dr. Cortez who said that he may need ablation in the future but for now would just use his metoprolol and Eliquis.  His metoprolol was increased to 50 twice daily because he was still having some breakthrough episodes.    He does have rare atrial fibrillation and when it occurs, it lasts about 1 hour, he usually takes an extra metoprolol and feels better.  He has had no dizziness or syncope.  He has no exertional chest tightness or pressure.  He has no orthopnea or PND.  His energy level is good and he takes medications as directed.    Past Medical History:   Diagnosis Date    A-fib     Coronary artery disease     COVID-19 11/2020    Dislocation, shoulder 5x-Bankart Repair    Hyperlipidemia     Knee swelling 18-24 months    Left bundle branch block     Strain of lumbar region 07/03/2018         Past Surgical History:   Procedure Laterality Date    ADENOIDECTOMY      CARDIAC CATHETERIZATION N/A 08/10/2017    by Eneida Black MD; Left main normal, LAD scattered 10% proximal stenosis, mid LAD 95% concentric stenosis near focal area of calcification, left circumflex normal,  first obtuse marginal branch proximal 10% stenosis, and right coronary artery with a 10% proximal and a 30% mid stenosis    CARDIAC CATHETERIZATION N/A 08/10/2017    by Eneida Black MD; Xience Alpine drug-eluting stent placement 3.5×18 mm to the mid LAD      CARDIAC CATHETERIZATION N/A 01/15/2021    Procedure: Coronary angiography;  Surgeon: Emile Krause MD;  Location:  [facility];  Service: Cardiovascular;  Laterality: N/A;    CARDIAC CATHETERIZATION N/A 01/15/2021    Procedure: Left Heart Cath;  Surgeon: Emile Krause MD;  Location:  [facility];  Service: Cardiovascular;  Laterality: N/A;    CARDIAC CATHETERIZATION N/A 01/15/2021    Procedure: Left ventriculography;  Surgeon: Emile Krause MD;  Location:  [facility];  Service: Cardiovascular;  Laterality: N/A;    COLONOSCOPY W/ POLYPECTOMY N/A 05/23/2022    Procedure: COLONOSCOPY WITH POLYPECTOMY;  Surgeon: Todd Craig MD;  Location: [facility];  Service: Gastroenterology;  Laterality: N/A;  Sigmoid polyp x 1    CORONARY ANGIOPLASTY WITH STENT PLACEMENT      LAD    SHOULDER SURGERY Left        Current Outpatient Medications on File Prior to Visit   Medication Sig Dispense Refill    apixaban (Eliquis) 5 MG tablet tablet Take 1 tablet by mouth Every 12 (Twelve) Hours. 180 tablet 3    atorvastatin (LIPITOR) 40 MG tablet Take 1 tablet by mouth Every Night. 90 tablet 3    methylPREDNISolone (MEDROL) 4 MG dose pack Use as directed by package instructions 21 tablet 0    metoprolol tartrate (LOPRESSOR) 50 MG tablet Take 1 tablet by mouth 2 (Two) Times a Day. 180 tablet 0     No current facility-administered medications on file prior to visit.       Social History     Socioeconomic History    Marital status:    Tobacco Use    Smoking status: Never    Smokeless tobacco: Never   Vaping Use    Vaping Use: Never used   Substance and Sexual Activity    Alcohol use: Yes      "Comment: caffeine use: 5-6 cups daily; VERY RARELY DRINKS    Drug use: No    Sexual activity: Defer           ROS    Procedures    ECG 12 Lead    Date/Time: 12/22/2023 12:44 PM  Performed by: Paula Hall MD    Authorized by: Paula Hall MD  Comparison: compared with previous ECG   Similar to previous ECG  Rhythm: sinus rhythm  Conduction: left bundle branch block    Clinical impression: abnormal EKG              Objective:      Vitals:    12/22/23 1232   BP: 125/72   BP Location: Left arm   Pulse: 65   Weight: 89.6 kg (197 lb 8 oz)   Height: 182.9 cm (72\")     Body mass index is 26.79 kg/m².    Vitals reviewed.   Constitutional:       General: Not in acute distress.     Appearance: Well-developed. Not diaphoretic.   Eyes:      General: No scleral icterus.     Conjunctiva/sclera: Conjunctivae normal.   HENT:      Head: Normocephalic and atraumatic.   Neck:      Thyroid: No thyromegaly.      Vascular: No carotid bruit or JVD.      Lymphadenopathy: No cervical adenopathy.   Pulmonary:      Effort: Pulmonary effort is normal. No respiratory distress.      Breath sounds: Normal breath sounds. No wheezing. No rhonchi. No rales.   Chest:      Chest wall: Not tender to palpatation.   Cardiovascular:      Normal rate. Regular rhythm.      Murmurs: There is no murmur.      No gallop.    Pulses:     Intact distal pulses.   Edema:     Peripheral edema absent.   Abdominal:      General: Bowel sounds are normal. There is no distension or abdominal bruit.      Palpations: Abdomen is soft. There is no abdominal mass.      Tenderness: There is no abdominal tenderness.   Musculoskeletal:         General: No deformity.      Extremities: No clubbing present.     Cervical back: Neck supple. Skin:     General: Skin is warm and dry. There is no cyanosis.      Coloration: Skin is not pale.      Findings: No rash.   Neurological:      Mental Status: Alert and oriented to person, place, and time.      Cranial Nerves: No " cranial nerve deficit.   Psychiatric:         Judgment: Judgment normal.         Lab Review:       Assessment:      Diagnosis Plan   1. Coronary artery disease involving native coronary artery of native heart without angina pectoris  ECG 12 Lead    TSH    Uric Acid    Lipoprotein A (LPA)    Apolipoprotein B    Comprehensive Metabolic Panel    CBC & Differential    Lipid Panel    Magnesium    Sedimentation Rate    High Sensitivity CRP      2. S/P coronary artery stent placement  ECG 12 Lead    TSH    Uric Acid    Lipoprotein A (LPA)    Apolipoprotein B    Comprehensive Metabolic Panel    CBC & Differential    Lipid Panel    Magnesium    Sedimentation Rate    High Sensitivity CRP      3. Mixed hyperlipidemia  TSH    Uric Acid    Lipoprotein A (LPA)    Apolipoprotein B    Comprehensive Metabolic Panel    CBC & Differential    Lipid Panel    Magnesium      4. Paroxysmal atrial fibrillation  TSH    Uric Acid    Lipoprotein A (LPA)    Apolipoprotein B    Comprehensive Metabolic Panel    CBC & Differential    Lipid Panel    Magnesium      5. LBBB (left bundle branch block)        6. Screening PSA (prostate specific antigen)  PSA Screen        CAD, s/p LAD stent 8/10/17, with presentation of unstable angina.  No further angina  Hyperlipidemia, treated.  On atorvastatin.  LBBB on ECG.   High stress job, has made changes.  Walks for exercise.  Atrial fibrillation, paroxysmal, continue Eliquis and metoprolol tartrate 50 mg twice daily.  He may take an extra metoprolol for atrial fibrillation that lasts more than 30 minutes.         Plan:       Repeat labs, see back in 1 year, no medication changes at this time.

## 2024-01-23 ENCOUNTER — LAB (OUTPATIENT)
Dept: LAB | Facility: HOSPITAL | Age: 59
End: 2024-01-23
Payer: COMMERCIAL

## 2024-01-23 ENCOUNTER — TELEPHONE (OUTPATIENT)
Dept: CARDIOLOGY | Facility: CLINIC | Age: 59
End: 2024-01-23
Payer: COMMERCIAL

## 2024-01-23 DIAGNOSIS — Z95.5 S/P CORONARY ARTERY STENT PLACEMENT: ICD-10-CM

## 2024-01-23 DIAGNOSIS — I48.0 PAROXYSMAL ATRIAL FIBRILLATION: ICD-10-CM

## 2024-01-23 DIAGNOSIS — Z12.5 SCREENING PSA (PROSTATE SPECIFIC ANTIGEN): ICD-10-CM

## 2024-01-23 DIAGNOSIS — E78.2 MIXED HYPERLIPIDEMIA: ICD-10-CM

## 2024-01-23 DIAGNOSIS — I25.10 CORONARY ARTERY DISEASE INVOLVING NATIVE CORONARY ARTERY OF NATIVE HEART WITHOUT ANGINA PECTORIS: ICD-10-CM

## 2024-01-23 LAB
ALBUMIN SERPL-MCNC: 4.9 G/DL (ref 3.5–5.2)
ALBUMIN/GLOB SERPL: 2.2 G/DL
ALP SERPL-CCNC: 69 U/L (ref 39–117)
ALT SERPL W P-5'-P-CCNC: 23 U/L (ref 1–41)
ANION GAP SERPL CALCULATED.3IONS-SCNC: 12.7 MMOL/L (ref 5–15)
AST SERPL-CCNC: 19 U/L (ref 1–40)
BASOPHILS # BLD AUTO: 0.05 10*3/MM3 (ref 0–0.2)
BASOPHILS NFR BLD AUTO: 0.7 % (ref 0–1.5)
BILIRUB SERPL-MCNC: 0.4 MG/DL (ref 0–1.2)
BUN SERPL-MCNC: 20 MG/DL (ref 6–20)
BUN/CREAT SERPL: 19.6 (ref 7–25)
CALCIUM SPEC-SCNC: 9.4 MG/DL (ref 8.6–10.5)
CHLORIDE SERPL-SCNC: 100 MMOL/L (ref 98–107)
CHOLEST SERPL-MCNC: 169 MG/DL (ref 0–200)
CO2 SERPL-SCNC: 23.3 MMOL/L (ref 22–29)
CREAT SERPL-MCNC: 1.02 MG/DL (ref 0.76–1.27)
CRP SERPL-MCNC: 0.11 MG/DL (ref 0.01–0.5)
DEPRECATED RDW RBC AUTO: 37 FL (ref 37–54)
EGFRCR SERPLBLD CKD-EPI 2021: 85.7 ML/MIN/1.73
EOSINOPHIL # BLD AUTO: 0.14 10*3/MM3 (ref 0–0.4)
EOSINOPHIL NFR BLD AUTO: 2 % (ref 0.3–6.2)
ERYTHROCYTE [DISTWIDTH] IN BLOOD BY AUTOMATED COUNT: 11.6 % (ref 12.3–15.4)
ERYTHROCYTE [SEDIMENTATION RATE] IN BLOOD: 2 MM/HR (ref 0–20)
GLOBULIN UR ELPH-MCNC: 2.2 GM/DL
GLUCOSE SERPL-MCNC: 103 MG/DL (ref 65–99)
HCT VFR BLD AUTO: 45.4 % (ref 37.5–51)
HDLC SERPL-MCNC: 44 MG/DL (ref 40–60)
HGB BLD-MCNC: 15.5 G/DL (ref 13–17.7)
IMM GRANULOCYTES # BLD AUTO: 0.05 10*3/MM3 (ref 0–0.05)
IMM GRANULOCYTES NFR BLD AUTO: 0.7 % (ref 0–0.5)
LDLC SERPL CALC-MCNC: 107 MG/DL (ref 0–100)
LDLC/HDLC SERPL: 2.4 {RATIO}
LYMPHOCYTES # BLD AUTO: 1.78 10*3/MM3 (ref 0.7–3.1)
LYMPHOCYTES NFR BLD AUTO: 24.8 % (ref 19.6–45.3)
MAGNESIUM SERPL-MCNC: 2.4 MG/DL (ref 1.6–2.6)
MCH RBC QN AUTO: 30.2 PG (ref 26.6–33)
MCHC RBC AUTO-ENTMCNC: 34.1 G/DL (ref 31.5–35.7)
MCV RBC AUTO: 88.5 FL (ref 79–97)
MONOCYTES # BLD AUTO: 0.59 10*3/MM3 (ref 0.1–0.9)
MONOCYTES NFR BLD AUTO: 8.2 % (ref 5–12)
NEUTROPHILS NFR BLD AUTO: 4.56 10*3/MM3 (ref 1.7–7)
NEUTROPHILS NFR BLD AUTO: 63.6 % (ref 42.7–76)
NRBC BLD AUTO-RTO: 0 /100 WBC (ref 0–0.2)
PLATELET # BLD AUTO: 273 10*3/MM3 (ref 140–450)
PMV BLD AUTO: 11.5 FL (ref 6–12)
POTASSIUM SERPL-SCNC: 4.4 MMOL/L (ref 3.5–5.2)
PROT SERPL-MCNC: 7.1 G/DL (ref 6–8.5)
PSA SERPL-MCNC: 0.62 NG/ML (ref 0–4)
RBC # BLD AUTO: 5.13 10*6/MM3 (ref 4.14–5.8)
SODIUM SERPL-SCNC: 136 MMOL/L (ref 136–145)
TRIGL SERPL-MCNC: 96 MG/DL (ref 0–150)
TSH SERPL DL<=0.05 MIU/L-ACNC: 2.73 UIU/ML (ref 0.27–4.2)
URATE SERPL-MCNC: 6.4 MG/DL (ref 3.4–7)
VLDLC SERPL-MCNC: 18 MG/DL (ref 5–40)
WBC NRBC COR # BLD AUTO: 7.17 10*3/MM3 (ref 3.4–10.8)

## 2024-01-23 PROCEDURE — 82172 ASSAY OF APOLIPOPROTEIN: CPT

## 2024-01-23 PROCEDURE — 86141 C-REACTIVE PROTEIN HS: CPT

## 2024-01-23 PROCEDURE — 80050 GENERAL HEALTH PANEL: CPT

## 2024-01-23 PROCEDURE — 36415 COLL VENOUS BLD VENIPUNCTURE: CPT

## 2024-01-23 PROCEDURE — 80061 LIPID PANEL: CPT

## 2024-01-23 PROCEDURE — 85652 RBC SED RATE AUTOMATED: CPT

## 2024-01-23 PROCEDURE — 83735 ASSAY OF MAGNESIUM: CPT

## 2024-01-23 PROCEDURE — 83695 ASSAY OF LIPOPROTEIN(A): CPT

## 2024-01-23 PROCEDURE — 84550 ASSAY OF BLOOD/URIC ACID: CPT

## 2024-01-23 PROCEDURE — G0103 PSA SCREENING: HCPCS

## 2024-01-23 NOTE — TELEPHONE ENCOUNTER
RM patient:  Please let him know that labs look good. PSA is normal.     LDL is a bit high.  There are two other cholesterol labs pending: lipoprotein A and apolipoprotein B.     We will call with those results and see if any changes need to be made.     Thanks!  LISA Guzman

## 2024-01-23 NOTE — TELEPHONE ENCOUNTER
Called and left VM, will continue to try to reach pt.    HUB- please put patient straight through to triage    Hanna Gagnon, RN  Triage RN  01/23/24 11:28 EST

## 2024-01-23 NOTE — TELEPHONE ENCOUNTER
Notified patient of results/recommendations. Patient verbalized understanding.    Kalpana Queen RN  Triage [facility] [place]

## 2024-01-24 ENCOUNTER — TELEPHONE (OUTPATIENT)
Dept: CARDIOLOGY | Facility: CLINIC | Age: 59
End: 2024-01-24
Payer: COMMERCIAL

## 2024-01-24 LAB — LPA SERPL-SCNC: 147.3 NMOL/L

## 2024-01-24 RX ORDER — ATORVASTATIN CALCIUM 80 MG/1
80 TABLET, FILM COATED ORAL NIGHTLY
Qty: 90 TABLET | Refills: 3 | Status: SHIPPED | OUTPATIENT
Start: 2024-01-24

## 2024-01-27 LAB — APO B SERPL-MCNC: 93 MG/DL

## 2024-01-28 ENCOUNTER — TELEPHONE (OUTPATIENT)
Dept: CARDIOLOGY | Facility: CLINIC | Age: 59
End: 2024-01-28
Payer: COMMERCIAL

## 2024-01-28 NOTE — TELEPHONE ENCOUNTER
Apo B is slightly elevated - no perla but will follow this number annually -pls let him know.     rm

## 2024-01-29 NOTE — TELEPHONE ENCOUNTER
I tried to call Abram Allen but there was no answer.  Left a voicemail asking patient to call back.  Will continue to try to reach pt.    HUB- if pt calls back, please transfer through to triage.    Thank you,    Carol HALL RN  Triage [facility] [place]  01/29/24 09:19 EST

## 2024-01-29 NOTE — TELEPHONE ENCOUNTER
Notified patient of results/recommendations. Patient verbalized understanding.    Kalpana Queen RN  Triage [facility] [place] [place]

## 2024-02-12 RX ORDER — METOPROLOL TARTRATE 50 MG/1
50 TABLET, FILM COATED ORAL 2 TIMES DAILY
Qty: 180 TABLET | Refills: 0 | Status: SHIPPED | OUTPATIENT
Start: 2024-02-12

## 2024-03-11 RX ORDER — ATORVASTATIN CALCIUM 80 MG/1
80 TABLET, FILM COATED ORAL NIGHTLY
Qty: 90 TABLET | Refills: 3 | Status: SHIPPED | OUTPATIENT
Start: 2024-03-11

## 2024-05-10 RX ORDER — METOPROLOL TARTRATE 50 MG/1
50 TABLET, FILM COATED ORAL 2 TIMES DAILY
Qty: 180 TABLET | Refills: 0 | Status: SHIPPED | OUTPATIENT
Start: 2024-05-10

## 2024-07-05 ENCOUNTER — OFFICE VISIT (OUTPATIENT)
Dept: FAMILY MEDICINE CLINIC | Facility: CLINIC | Age: 59
End: 2024-07-05
Payer: COMMERCIAL

## 2024-07-05 VITALS
TEMPERATURE: 98.2 F | HEART RATE: 60 BPM | SYSTOLIC BLOOD PRESSURE: 128 MMHG | OXYGEN SATURATION: 98 % | HEIGHT: 72 IN | WEIGHT: 189.1 LBS | DIASTOLIC BLOOD PRESSURE: 72 MMHG | BODY MASS INDEX: 25.61 KG/M2

## 2024-07-05 DIAGNOSIS — M62.522 ATROPHY OF MUSCLE OF LEFT UPPER ARM: Primary | ICD-10-CM

## 2024-07-05 DIAGNOSIS — I48.0 PAROXYSMAL ATRIAL FIBRILLATION: ICD-10-CM

## 2024-07-05 DIAGNOSIS — E78.2 MIXED HYPERLIPIDEMIA: ICD-10-CM

## 2024-07-05 DIAGNOSIS — I25.10 CORONARY ARTERY DISEASE INVOLVING NATIVE CORONARY ARTERY OF NATIVE HEART WITHOUT ANGINA PECTORIS: ICD-10-CM

## 2024-07-05 PROCEDURE — 99204 OFFICE O/P NEW MOD 45 MIN: CPT | Performed by: FAMILY MEDICINE

## 2024-07-05 NOTE — PROGRESS NOTES
Subjective   Abram Allen is a 58 y.o. male who is here for   Chief Complaint   Patient presents with    [facility]    Annual Exam     Requesting   .     History of Present Illness   History of Present Illness  The patient presents for establishment of [facility].    The patient, previously under the care of a family physician, has not had a routine checkup in over 5 years. He has undergone two colonoscopies, the most recent of which was conducted approximately 2 years ago, performed by Dr. Craig at [facility]. His next colonoscopy is scheduled for 2027. His medical history includes two cardiac catheterizations and a 95 percent blockage in the LAD at the age of 58. His cardiologist, Dr. Hall, has taken over his cardiac care. He has experienced intermittent episodes of atrial fibrillation, for which he has previously sought ablation. His atrial fibrillation was severe enough to necessitate ablation, prompting Dr. Hall to monitor his symptoms. He has been documenting his atrial fibrillation episodes, with the longest episode lasting several hours. His current medication regimen includes metoprolol, Lipitor 80 mg daily, and Eliquis. He denies experiencing chest pain or shortness of breath.    The patient reports significant atrophy in the left shoulder, attributing this to a C5-C6 nerve issue. Despite undergoing several EMG studies, the C5-C6 muscles innervate are not functioning optimally, leading to significant weakness and atrophy in the left arm. He has undergone MRIs and CT scans of the brain and nerves, all of which have yielded no significant findings. Initially, Dr. Vaughan suspected a nerve compression or disc issue, who ordered an MRI and a myelogram. Surgery was proposed, but due to the patient's 30 percent probability of symptom relief, the patient declined. His symptoms have been present for several years, but have recently worsened. He has recently undergone dry needling in the high cervical  "occipital region, which he reports as beneficial. He has not consulted a neurosurgeon in several years. He denies experiencing numbness or tingling, but reports occasional fasciculations in some of his muscles. He also reports a deviation of his tongue to one side, which he attributes to dry needling.    Supplemental Information  He had a Bankart repair. He had a labral tear and dislocated five times 30 years ago.   He has a family history of MS.    Review of Systems   Constitutional:  Negative for activity change and appetite change.   Respiratory:  Negative for cough and shortness of breath.    Cardiovascular:  Negative for chest pain and leg swelling.   Skin:  Negative for color change and rash.       Objective   Vitals:    07/05/24 1242   BP: 128/72   BP Location: Right arm   Patient Position: Sitting   Cuff Size: Adult   Pulse: 60   Temp: 98.2 °F (36.8 °C)   SpO2: 98%   Weight: 85.8 kg (189 lb 1.6 oz)   Height: 182.9 cm (72.01\")      Physical Exam  Vitals and nursing note reviewed.   Constitutional:       Appearance: Normal appearance. He is normal weight.   HENT:      Head: Normocephalic and atraumatic.   Cardiovascular:      Rate and Rhythm: Normal rate and regular rhythm.      Pulses: Normal pulses.      Heart sounds: No murmur heard.  Pulmonary:      Effort: Pulmonary effort is normal. No respiratory distress.      Breath sounds: Normal breath sounds. No wheezing.   Musculoskeletal:      Left shoulder: Deformity present. No tenderness, bony tenderness or crepitus. Decreased range of motion. Decreased strength.      Comments: Significant muscle atrophy of supraspinatus, infraspinatus, and deltoid   Skin:     General: Skin is warm and dry.   Neurological:      General: No focal deficit present.      Mental Status: He is alert.   Psychiatric:         Mood and Affect: Mood normal.         Thought Content: Thought content normal.       Physical Exam        Assessment & Plan   Assessment & Plan    Diagnoses and " all orders for this visit:    1. Atrophy of muscle of left upper arm (Primary)  Unclear etiology.  Concern for possible cervical radiculopathy issue.  Will obtain previous records.  Consider repeat MRI.  Patient has underwent MRI, CT myelogram and multiple EMGs in the past about 15 years ago.  2. Mixed hyperlipidemia  Currently on Lipitor 80 mg p.o. nightly  3. Coronary artery disease involving native coronary artery of native heart without angina pectoris  Stable.  No acute chest pain at this time.  He is currently seeing Dr. Hall.  4. Paroxysmal atrial fibrillation  Currently controlled in the office.  Patient is on Eliquis 5 mg twice daily for stroke prevention as well as taking metoprolol 50 mg twice daily for rate control.    Results      There are no Patient Instructions on file for this visit.    Medications Discontinued During This Encounter   Medication Reason    methylPREDNISolone (MEDROL) 4 MG dose pack *Therapy completed        Return in about 4 weeks (around 8/2/2024), or Muscle weakness..    Patient or patient representative verbalized consent for the use of Ambient Listening during the visit with  Erwin Novak MD for chart documentation. 7/5/2024  13:13 EDT    Erwin Novak MD  [place], [place]

## 2024-08-02 ENCOUNTER — OFFICE VISIT (OUTPATIENT)
Dept: FAMILY MEDICINE CLINIC | Facility: CLINIC | Age: 59
End: 2024-08-02
Payer: COMMERCIAL

## 2024-08-02 VITALS
TEMPERATURE: 98.7 F | SYSTOLIC BLOOD PRESSURE: 120 MMHG | WEIGHT: 194 LBS | OXYGEN SATURATION: 96 % | DIASTOLIC BLOOD PRESSURE: 70 MMHG | BODY MASS INDEX: 26.28 KG/M2 | HEART RATE: 61 BPM | HEIGHT: 72 IN

## 2024-08-02 DIAGNOSIS — M48.02 NEUROFORAMINAL STENOSIS OF CERVICAL SPINE: ICD-10-CM

## 2024-08-02 DIAGNOSIS — M62.522 ATROPHY OF MUSCLE OF LEFT UPPER ARM: Primary | ICD-10-CM

## 2024-08-02 DIAGNOSIS — M50.30 DEGENERATIVE DISC DISEASE, CERVICAL: ICD-10-CM

## 2024-08-02 PROCEDURE — 99213 OFFICE O/P EST LOW 20 MIN: CPT | Performed by: FAMILY MEDICINE

## 2024-08-02 NOTE — PROGRESS NOTES
"  Subjective   Abram Allen is a 58 y.o. male who is here for   Chief Complaint   Patient presents with    Uncontrollable Muscle Weakness   .     History of Present Illness   History of Present Illness  Patient presents to the office for follow-up of muscle weakness on the left shoulder.  Patient has had multiple MRIs about 10 years ago for issues related to decreased strength and muscle atrophy on left shoulder.  Patient was diagnosed with degenerative disc disease and neuroforaminal stenosis.  This has progressively been worsening since that time.    Patient works as a physical therapist.  He has been doing home physical therapy exercises without improvement.  Answers submitted by the patient for this visit:  Primary Reason for Visit (Submitted on 7/26/2024)  What is the primary reason for your visit?: Other  Other (Submitted on 7/26/2024)  Please describe your symptoms.: weakness in left UE  Have you had these symptoms before?: Yes  How long have you been having these symptoms?: Greater than 2 weeks  Please describe any probable cause for these symptoms. : nerve involvement  Review of Systems   Constitutional:  Negative for chills and fever.   Respiratory:  Negative for cough and shortness of breath.    Musculoskeletal:         Muscle weakness on left shoulder.       Objective   Vitals:    08/02/24 1429   BP: 120/70   Pulse: 61   Temp: 98.7 °F (37.1 °C)   SpO2: 96%   Weight: 88 kg (194 lb)   Height: 182.9 cm (72.01\")      Physical Exam  Vitals and nursing note reviewed.   Constitutional:       Appearance: Normal appearance. He is normal weight.   HENT:      Head: Normocephalic and atraumatic.   Cardiovascular:      Rate and Rhythm: Normal rate and regular rhythm.      Pulses: Normal pulses.      Heart sounds: No murmur heard.  Pulmonary:      Effort: Pulmonary effort is normal. No respiratory distress.      Breath sounds: Normal breath sounds. No wheezing.   Musculoskeletal:      Left shoulder: Deformity " (muscle atrophy of deltoid, suprspinatus, biceps) present. No tenderness or bony tenderness. Decreased range of motion. Decreased strength.   Skin:     General: Skin is warm and dry.   Neurological:      General: No focal deficit present.      Mental Status: He is alert.   Psychiatric:         Mood and Affect: Mood normal.         Thought Content: Thought content normal.       Physical Exam        Assessment & Plan   Assessment & Plan    Diagnoses and all orders for this visit:    1. Atrophy of muscle of left upper arm (Primary)  Unclear etiology.  Will obtain new MRI of cervical spine.  And consider referral to neurosurgery.  -     MRI Cervical Spine Without Contrast; Future    2. Degenerative disc disease, cervical  Unclear etiology.  Will obtain new MRI of cervical spine.  And consider referral to neurosurgery.  -     MRI Cervical Spine Without Contrast; Future    3. Neuroforaminal stenosis of cervical spine  Unclear etiology.  Will obtain new MRI of cervical spine.  And consider referral to neurosurgery.  -     MRI Cervical Spine Without Contrast; Future      Results      There are no Patient Instructions on file for this visit.    There are no discontinued medications.     Return After mri..             Erwin Novak MD  [place], [place]

## 2024-08-07 RX ORDER — METOPROLOL TARTRATE 50 MG/1
50 TABLET, FILM COATED ORAL 2 TIMES DAILY
Qty: 180 TABLET | Refills: 2 | Status: SHIPPED | OUTPATIENT
Start: 2024-08-07

## 2024-08-07 NOTE — TELEPHONE ENCOUNTER
NOV-12/27/24-RM  LOV-12/22/23-RM    Plan:       Repeat labs, see back in 1 year, no medication changes at this time.

## 2024-08-21 ENCOUNTER — HOSPITAL ENCOUNTER (OUTPATIENT)
Dept: MRI IMAGING | Facility: HOSPITAL | Age: 59
Discharge: HOME | End: 2024-08-21
Admitting: FAMILY MEDICINE
Payer: COMMERCIAL

## 2024-08-21 DIAGNOSIS — M48.02 NEUROFORAMINAL STENOSIS OF CERVICAL SPINE: ICD-10-CM

## 2024-08-21 DIAGNOSIS — M50.30 DEGENERATIVE DISC DISEASE, CERVICAL: ICD-10-CM

## 2024-08-21 DIAGNOSIS — M62.522 ATROPHY OF MUSCLE OF LEFT UPPER ARM: ICD-10-CM

## 2024-08-21 PROCEDURE — 72141 MRI NECK SPINE W/O DYE: CPT

## 2024-08-22 DIAGNOSIS — M50.30 DDD (DEGENERATIVE DISC DISEASE), CERVICAL: ICD-10-CM

## 2024-08-22 DIAGNOSIS — M48.02 SPINAL STENOSIS OF CERVICAL REGION: ICD-10-CM

## 2024-08-22 DIAGNOSIS — M62.522 ATROPHY OF MUSCLE OF LEFT UPPER ARM: Primary | ICD-10-CM

## 2024-08-22 NOTE — PROGRESS NOTES
Advanced multilevel cervical spondylosis and central canal narrowing.  Most pronounced at C5-C6 and C6 C7.  Disc protrusions are noted at C6-C7 and C7-T1 levels.  Hypertrophic posterior facet changes resulting in neural foraminal narrowing.  Recommend patient follow-up with a neurosurgeon if patient is willing.

## 2024-09-03 ENCOUNTER — TELEPHONE (OUTPATIENT)
Dept: NEUROSURGERY | Facility: CLINIC | Age: 59
End: 2024-09-03
Payer: COMMERCIAL

## 2024-09-03 NOTE — PROGRESS NOTES
"Subjective   History of Present Illness: Abram Allen is a 58 y.o. male is being seen for consultation today at the request of Erwin Novak*for weakness in his left arm. 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  His history is along 1 dating back to 2011 and before when he was noticing some left upper arm weakness and was evaluated for possible brachial plexopathy versus cervical disc changes and saw a neurosurgeon in 2011 who suggested based on his clinical exam that surgery was not mandatory and probably not with the risk at the time.  He felt like things stabilized until more recent months when his left arm pain and atrophy seems to be progressing.  He denies any pain in the neck or arms.  He has never had pain in the neck or arms.  He denies any right arm symptoms.  He denies any numbness in either arm or either leg.  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    History of Present Illness    Tobacco Use: Low Risk  (9/5/2024)    Patient History     Smoking Tobacco Use: Never     Smokeless Tobacco Use: Never     Passive Exposure: Not on file        The following portions of the patient's history were reviewed and updated as appropriate: allergies, current medications, past family history, past medical history, past social history, past surgical history and problem list.    Review of Systems    Objective     Vitals:    09/05/24 1430   BP: 126/76   Weight: 88 kg (194 lb)   Height: 182.9 cm (72.01\")     Body mass index is 26.3 kg/m².      Physical Exam  Neurologic Exam    Physical Exam:    CONSTITUTIONAL: This 58 year old  male appears well developed, well-nourished and in no acute distress.    HEAD & FACE: the head and face are symmetric, normocephalic and atraumatic.    EYES: Inspection of the conjunctivae and lids reveals no swelling, erythema or discharge.  Pupils are round, equal and " reactive to light and there is no scleral icterus.    EARS, NOSE, MOUTH & THROAT: On inspection, the ears and nose are within normal limits.    NECK: the neck is supple and symmetric. The trachea is midline with no masses.  He has limited range of motion in lateral bending and to some degree extension with good rotation and flexion.    PULMONARY: Respiratory effort is normal with no increased work of breathing or signs of respiratory distress.    CARDIOVASCULAR:  Examination of the extremities shows no edema or varicosities.    MUSCULOSKELETAL: Gait and station are within normal limits. The spine has no tenderness in the midline.    SKIN: The skin is warm, dry and intact    NEUROLOGIC:   Cranial Nerves 2-12 intact and his tongue seems to protrude roughly in the midline may be slightly left of center without any fasciculations  Motor strength is definitely weak at 4/5 in the bicep and the deltoid on the left. Muscle bulk and tone reveal atrophy of the left shoulder blade, left deltoid, left bicep  Sensory exam is normal to fine touch to confrontational testing bilaterally  Reflexes on the right side demonstrates 1/4 Triceps Reflex, 2/4 Biceps Reflex, 1/4 Brachioradialis Reflex, 2/4 Knee Jerk Reflex, 1/4 Ankle Jerk Reflex and no ankle clonus on the right.   Reflexes on the left side demonstrates 1/4 Triceps Reflex, 2/4 Biceps Reflex, 1/4 Brachioradialis Reflex, 2/4 Knee Jerk Reflex, 1/4 Ankle Jerk Reflex and no ankle clonus on the left.  Superficial/Primitive Reflexes: primitive reflexes were absent.  Veloz's, Babinski, and Clonus signs all negative.  No coordination deficit observed.  Radicular testing showed a negative Spurling's maneuver  Cortical function is intact and without deficits. Speech is normal.    PSYCHIATRIC: oriented to person, place and time. Patient's mood and affect are normal.    Assessment & Plan   Independent Review of Radiographic Studies:      I personally reviewed the images from the  following studies.    MRI of the cervical spine done at [facility] on August 21, 2024 reveals advanced multilevel cervical spondylosis.  Most pronounced are the changes at C5-C6 and C6-C7.  Myelomalacia of the cervical cord is suspected at C5-C7 due to the spondylosis, cervical stenosis, and cord compression.          Medical Decision Making:      This is a very unusual presentation of left arm weakness with cervical spondylosis throughout the cervical spine.  Specifically he does have very severe left neuroforaminal narrowing due to spondylosis at C4-C5 which would correlate with the C5 deltoid weakness but does not explain why he has never had pain or numbness in that region.  He also has severe bilateral neuroforaminal narrowing due to spondylosis at C5-C6 which could explain his left bicep atrophy and weakness but again he does not have C6 numbness nor does he have radicular pain in the arm.  Most concerning is he has cord compression and even some myelomalacia at C6-C7 but does not again have pain, any findings consistent with myelopathy on exam nor any numbness or weakness in the hands to correlate with cord compression at C6-C7.  His tricep function appears to be relatively symmetrical and normal.  I do not have the records of a previous EMG/NCV study done around 2011 and we probably will not be able to find them.  I think were going to be compelled to offer a 3 level anterior cervical discectomy and fusion with 0 profile devices at C4-C5, C5-C6 and C6-C7 but I would like to get an EMG/NCV study to at least confirm what we believe is nerve root impingement on the left at C4-C5 and C5-C6    Return in about 1 month (around 10/5/2024) for review of completed workup.    Diagnoses and all orders for this visit:    1. Left arm weakness (Primary)  -     EMG both arms; Future  -     Nerve Conduction Test both arms; Future    2. Cervical spondylosis without myelopathy  -     EMG both arms; Future  -     Nerve  Conduction Test both arms; Future    3. Neuroforaminal stenosis of cervical spine  -     EMG both arms; Future  -     Nerve Conduction Test both arms; Future             Alexis Prado MD FACS [facility]  Neurological Surgery

## 2024-09-05 ENCOUNTER — OFFICE VISIT (OUTPATIENT)
Dept: NEUROSURGERY | Facility: CLINIC | Age: 59
End: 2024-09-05
Payer: COMMERCIAL

## 2024-09-05 VITALS
DIASTOLIC BLOOD PRESSURE: 76 MMHG | BODY MASS INDEX: 26.28 KG/M2 | SYSTOLIC BLOOD PRESSURE: 126 MMHG | HEIGHT: 72 IN | WEIGHT: 194 LBS

## 2024-09-05 DIAGNOSIS — R29.898 LEFT ARM WEAKNESS: Primary | ICD-10-CM

## 2024-09-05 DIAGNOSIS — M47.812 CERVICAL SPONDYLOSIS WITHOUT MYELOPATHY: ICD-10-CM

## 2024-09-05 DIAGNOSIS — M48.02 NEUROFORAMINAL STENOSIS OF CERVICAL SPINE: ICD-10-CM

## 2024-09-05 PROCEDURE — 99204 OFFICE O/P NEW MOD 45 MIN: CPT | Performed by: NEUROLOGICAL SURGERY

## 2024-09-16 ENCOUNTER — PROCEDURE VISIT (OUTPATIENT)
Dept: NEUROLOGY | Facility: CLINIC | Age: 59
End: 2024-09-16
Payer: COMMERCIAL

## 2024-09-16 VITALS
SYSTOLIC BLOOD PRESSURE: 126 MMHG | DIASTOLIC BLOOD PRESSURE: 80 MMHG | BODY MASS INDEX: 26.31 KG/M2 | OXYGEN SATURATION: 97 % | HEIGHT: 72 IN | HEART RATE: 70 BPM

## 2024-09-16 DIAGNOSIS — R29.898 LEFT ARM WEAKNESS: ICD-10-CM

## 2024-09-16 DIAGNOSIS — M47.812 CERVICAL SPONDYLOSIS WITHOUT MYELOPATHY: ICD-10-CM

## 2024-09-16 DIAGNOSIS — M48.02 NEUROFORAMINAL STENOSIS OF CERVICAL SPINE: ICD-10-CM

## 2024-09-16 PROCEDURE — 95909 NRV CNDJ TST 5-6 STUDIES: CPT | Performed by: PSYCHIATRY & NEUROLOGY

## 2024-09-16 PROCEDURE — 95886 MUSC TEST DONE W/N TEST COMP: CPT | Performed by: PSYCHIATRY & NEUROLOGY

## 2024-09-19 ENCOUNTER — OFFICE VISIT (OUTPATIENT)
Dept: NEUROSURGERY | Facility: CLINIC | Age: 59
End: 2024-09-19
Payer: COMMERCIAL

## 2024-09-19 VITALS
HEIGHT: 72 IN | WEIGHT: 194 LBS | BODY MASS INDEX: 26.28 KG/M2 | DIASTOLIC BLOOD PRESSURE: 70 MMHG | SYSTOLIC BLOOD PRESSURE: 122 MMHG | RESPIRATION RATE: 20 BRPM

## 2024-09-19 DIAGNOSIS — M47.812 CERVICAL SPONDYLOSIS WITHOUT MYELOPATHY: Primary | ICD-10-CM

## 2024-09-19 DIAGNOSIS — R29.898 LEFT ARM WEAKNESS: ICD-10-CM

## 2024-09-19 DIAGNOSIS — M48.02 NEUROFORAMINAL STENOSIS OF CERVICAL SPINE: ICD-10-CM

## 2024-09-19 PROCEDURE — 99213 OFFICE O/P EST LOW 20 MIN: CPT | Performed by: NEUROLOGICAL SURGERY

## 2024-09-20 ENCOUNTER — OFFICE VISIT (OUTPATIENT)
Dept: CARDIOLOGY | Facility: CLINIC | Age: 59
End: 2024-09-20
Payer: COMMERCIAL

## 2024-09-20 VITALS
HEIGHT: 72 IN | BODY MASS INDEX: 25.71 KG/M2 | SYSTOLIC BLOOD PRESSURE: 128 MMHG | DIASTOLIC BLOOD PRESSURE: 78 MMHG | OXYGEN SATURATION: 97 % | HEART RATE: 46 BPM | WEIGHT: 189.8 LBS

## 2024-09-20 DIAGNOSIS — Z95.5 S/P CORONARY ARTERY STENT PLACEMENT: ICD-10-CM

## 2024-09-20 DIAGNOSIS — I25.10 CORONARY ARTERY DISEASE INVOLVING NATIVE CORONARY ARTERY OF NATIVE HEART WITHOUT ANGINA PECTORIS: Primary | ICD-10-CM

## 2024-09-20 DIAGNOSIS — E78.2 MIXED HYPERLIPIDEMIA: ICD-10-CM

## 2024-09-20 DIAGNOSIS — I44.7 LBBB (LEFT BUNDLE BRANCH BLOCK): ICD-10-CM

## 2024-09-20 DIAGNOSIS — I48.0 PAROXYSMAL ATRIAL FIBRILLATION: ICD-10-CM

## 2024-09-20 PROCEDURE — 99214 OFFICE O/P EST MOD 30 MIN: CPT | Performed by: INTERNAL MEDICINE

## 2024-09-20 PROCEDURE — 93000 ELECTROCARDIOGRAM COMPLETE: CPT | Performed by: INTERNAL MEDICINE

## 2024-09-20 RX ORDER — ROSUVASTATIN CALCIUM 40 MG/1
40 TABLET, COATED ORAL NIGHTLY
Qty: 90 TABLET | Refills: 3 | Status: SHIPPED | OUTPATIENT
Start: 2024-09-20

## 2024-10-11 ENCOUNTER — TELEPHONE (OUTPATIENT)
Dept: CARDIOLOGY | Facility: CLINIC | Age: 59
End: 2024-10-11
Payer: COMMERCIAL

## 2024-10-11 ENCOUNTER — HOSPITAL ENCOUNTER (OUTPATIENT)
Dept: NUCLEAR MEDICINE | Facility: HOSPITAL | Age: 59
Discharge: HOME | End: 2024-10-11
Payer: COMMERCIAL

## 2024-10-11 ENCOUNTER — HOSPITAL ENCOUNTER (OUTPATIENT)
Dept: CARDIOLOGY | Facility: HOSPITAL | Age: 59
Discharge: HOME | End: 2024-10-11
Payer: COMMERCIAL

## 2024-10-11 VITALS
HEIGHT: 72 IN | BODY MASS INDEX: 25.71 KG/M2 | WEIGHT: 189.82 LBS | DIASTOLIC BLOOD PRESSURE: 80 MMHG | HEART RATE: 54 BPM | SYSTOLIC BLOOD PRESSURE: 115 MMHG

## 2024-10-11 DIAGNOSIS — I25.10 CORONARY ARTERY DISEASE INVOLVING NATIVE CORONARY ARTERY OF NATIVE HEART WITHOUT ANGINA PECTORIS: ICD-10-CM

## 2024-10-11 DIAGNOSIS — Z95.5 S/P CORONARY ARTERY STENT PLACEMENT: ICD-10-CM

## 2024-10-11 DIAGNOSIS — I44.7 LBBB (LEFT BUNDLE BRANCH BLOCK): ICD-10-CM

## 2024-10-11 LAB
AORTIC DIMENSIONLESS INDEX: 0.7 (DI)
BH CV ECHO MEAS - AO MAX PG: 11.2 MMHG
BH CV ECHO MEAS - AO MEAN PG: 6 MMHG
BH CV ECHO MEAS - AO ROOT DIAM: 3.3 CM
BH CV ECHO MEAS - AO V2 MAX: 167 CM/SEC
BH CV ECHO MEAS - AO V2 VTI: 40.6 CM
BH CV ECHO MEAS - AVA(I,D): 2.21 CM2
BH CV ECHO MEAS - EDV(CUBED): 91.1 ML
BH CV ECHO MEAS - EDV(MOD-SP2): 120 ML
BH CV ECHO MEAS - EDV(MOD-SP4): 112 ML
BH CV ECHO MEAS - EF(MOD-BP): 59.5 %
BH CV ECHO MEAS - EF(MOD-SP2): 62.5 %
BH CV ECHO MEAS - EF(MOD-SP4): 58 %
BH CV ECHO MEAS - ESV(CUBED): 30.6 ML
BH CV ECHO MEAS - ESV(MOD-SP2): 45 ML
BH CV ECHO MEAS - ESV(MOD-SP4): 47 ML
BH CV ECHO MEAS - FS: 30.5 %
BH CV ECHO MEAS - IVS/LVPW: 1.11 CM
BH CV ECHO MEAS - IVSD: 1 CM
BH CV ECHO MEAS - LAT PEAK E' VEL: 12 CM/SEC
BH CV ECHO MEAS - LV DIASTOLIC VOL/BSA (35-75): 53.9 CM2
BH CV ECHO MEAS - LV MASS(C)D: 142.9 GRAMS
BH CV ECHO MEAS - LV MAX PG: 6.2 MMHG
BH CV ECHO MEAS - LV MEAN PG: 3 MMHG
BH CV ECHO MEAS - LV SYSTOLIC VOL/BSA (12-30): 22.6 CM2
BH CV ECHO MEAS - LV V1 MAX: 124 CM/SEC
BH CV ECHO MEAS - LV V1 VTI: 26.9 CM
BH CV ECHO MEAS - LVIDD: 4.5 CM
BH CV ECHO MEAS - LVIDS: 3.1 CM
BH CV ECHO MEAS - LVOT AREA: 3.3 CM2
BH CV ECHO MEAS - LVOT DIAM: 2.06 CM
BH CV ECHO MEAS - LVPWD: 0.9 CM
BH CV ECHO MEAS - MED PEAK E' VEL: 7.4 CM/SEC
BH CV ECHO MEAS - MV A DUR: 0.11 SEC
BH CV ECHO MEAS - MV A MAX VEL: 48.2 CM/SEC
BH CV ECHO MEAS - MV DEC SLOPE: 213.6 CM/SEC2
BH CV ECHO MEAS - MV DEC TIME: 457 SEC
BH CV ECHO MEAS - MV E MAX VEL: 52.9 CM/SEC
BH CV ECHO MEAS - MV E/A: 1.1
BH CV ECHO MEAS - MV MAX PG: 1.99 MMHG
BH CV ECHO MEAS - MV MEAN PG: 0.98 MMHG
BH CV ECHO MEAS - MV P1/2T: 90.5 MSEC
BH CV ECHO MEAS - MV V2 VTI: 23.4 CM
BH CV ECHO MEAS - MVA(P1/2T): 2.43 CM2
BH CV ECHO MEAS - MVA(VTI): 3.8 CM2
BH CV ECHO MEAS - PA ACC TIME: 0.09 SEC
BH CV ECHO MEAS - PA V2 MAX: 111.2 CM/SEC
BH CV ECHO MEAS - PI END-D VEL: 79.1 CM/SEC
BH CV ECHO MEAS - PULM A REVS DUR: 0.08 SEC
BH CV ECHO MEAS - PULM A REVS VEL: 33.2 CM/SEC
BH CV ECHO MEAS - PULM DIAS VEL: 48.8 CM/SEC
BH CV ECHO MEAS - PULM S/D: 1.2
BH CV ECHO MEAS - PULM SYS VEL: 58.3 CM/SEC
BH CV ECHO MEAS - RAP SYSTOLE: 3 MMHG
BH CV ECHO MEAS - RV MAX PG: 1.48 MMHG
BH CV ECHO MEAS - RV V1 MAX: 60.8 CM/SEC
BH CV ECHO MEAS - RV V1 VTI: 12.8 CM
BH CV ECHO MEAS - RVSP: 15.2 MMHG
BH CV ECHO MEAS - SV(LVOT): 89.9 ML
BH CV ECHO MEAS - SV(MOD-SP2): 75 ML
BH CV ECHO MEAS - SV(MOD-SP4): 65 ML
BH CV ECHO MEAS - SVI(LVOT): 43.2 ML/M2
BH CV ECHO MEAS - SVI(MOD-SP2): 36.1 ML/M2
BH CV ECHO MEAS - SVI(MOD-SP4): 31.3 ML/M2
BH CV ECHO MEAS - TAPSE (>1.6): 2.7 CM
BH CV ECHO MEAS - TR MAX PG: 12.2 MMHG
BH CV ECHO MEAS - TR MAX VEL: 174.5 CM/SEC
BH CV ECHO MEASUREMENTS AVERAGE E/E' RATIO: 5.45
BH CV REST NUCLEAR ISOTOPE DOSE: 12 MCI
BH CV STRESS BP STAGE 1: NORMAL
BH CV STRESS COMMENTS STAGE 1: NORMAL
BH CV STRESS DOSE REGADENOSON STAGE 1: 0.4
BH CV STRESS DURATION MIN STAGE 1: 0
BH CV STRESS DURATION SEC STAGE 1: 30
BH CV STRESS HR STAGE 1: 56
BH CV STRESS NUCLEAR ISOTOPE DOSE: 35.5 MCI
BH CV STRESS O2 STAGE 1: 99
BH CV STRESS PROTOCOL 1: NORMAL
BH CV STRESS RECOVERY BP: NORMAL MMHG
BH CV STRESS RECOVERY HR: 75 BPM
BH CV STRESS RECOVERY O2: 100 %
BH CV STRESS STAGE 1: 1
BH CV XLRA - RV BASE: 3.5 CM
BH CV XLRA - TDI S': 13.3 CM/SEC
LEFT ATRIUM VOLUME INDEX: 22.6 ML/M2
LV EF NUC BP: 65 %
MAXIMAL PREDICTED HEART RATE: 162 BPM
PERCENT MAX PREDICTED HR: 52.47 %
STRESS BASELINE BP: NORMAL MMHG
STRESS BASELINE HR: 54 BPM
STRESS O2 SAT REST: 100 %
STRESS PERCENT HR: 62 %
STRESS POST ESTIMATED WORKLOAD: 1 METS
STRESS POST EXERCISE DUR SEC: 30 SEC
STRESS POST O2 SAT PEAK: 100 %
STRESS POST PEAK BP: NORMAL MMHG
STRESS POST PEAK HR: 85 BPM
STRESS TARGET HR: 138 BPM

## 2024-10-11 PROCEDURE — A9500 TC99M SESTAMIBI: HCPCS | Performed by: INTERNAL MEDICINE

## 2024-10-11 PROCEDURE — 93306 TTE W/DOPPLER COMPLETE: CPT

## 2024-10-11 PROCEDURE — 25010000002 REGADENOSON 0.4 MG/5ML SOLUTION: Performed by: INTERNAL MEDICINE

## 2024-10-11 PROCEDURE — 0 TECHNETIUM SESTAMIBI: Performed by: INTERNAL MEDICINE

## 2024-10-11 PROCEDURE — 93017 CV STRESS TEST TRACING ONLY: CPT

## 2024-10-11 PROCEDURE — 78452 HT MUSCLE IMAGE SPECT MULT: CPT

## 2024-10-11 RX ORDER — REGADENOSON 0.08 MG/ML
0.4 INJECTION, SOLUTION INTRAVENOUS
Status: COMPLETED | OUTPATIENT
Start: 2024-10-11 | End: 2024-10-11

## 2024-10-11 RX ADMIN — REGADENOSON 0.4 MG: 0.08 INJECTION, SOLUTION INTRAVENOUS at 09:02

## 2024-10-11 RX ADMIN — TECHNETIUM TC 99M SESTAMIBI 1 DOSE: 1 INJECTION INTRAVENOUS at 09:02

## 2024-10-11 RX ADMIN — TECHNETIUM TC 99M SESTAMIBI 1 DOSE: 1 INJECTION INTRAVENOUS at 07:39

## 2024-10-11 NOTE — TELEPHONE ENCOUNTER
Results called to pt.  Instructed to call with any further questions or concerns.  Verbalized understanding.    Hanna Gagnon RN  Triage Nurse, [facility] [place]  10/11/24 14:15 EDT

## 2024-10-11 NOTE — TELEPHONE ENCOUNTER
Called and left VM. Will continue to try to reach patient. HUB transfer call to triage.     Kalpana Haines RN  Triage [facility] [place]

## 2024-10-11 NOTE — TELEPHONE ENCOUNTER
I spoke with Abram Allen and updated pt on results from provider.  Pt verbalized understanding and has no further questions at this time.    Thank you,    Carol HALL, RN  [place] [facility] [place]  10/11/24 13:52 EDT

## 2024-10-30 ENCOUNTER — PRE-ADMISSION TESTING (OUTPATIENT)
Dept: PREADMISSION TESTING | Facility: HOSPITAL | Age: 59
End: 2024-10-30
Payer: COMMERCIAL

## 2024-10-30 VITALS
TEMPERATURE: 97.2 F | SYSTOLIC BLOOD PRESSURE: 111 MMHG | RESPIRATION RATE: 20 BRPM | WEIGHT: 189.4 LBS | DIASTOLIC BLOOD PRESSURE: 62 MMHG | BODY MASS INDEX: 25.1 KG/M2 | HEART RATE: 70 BPM | OXYGEN SATURATION: 98 % | HEIGHT: 73 IN

## 2024-10-30 LAB
ANION GAP SERPL CALCULATED.3IONS-SCNC: 9.4 MMOL/L (ref 5–15)
BUN SERPL-MCNC: 17 MG/DL (ref 6–20)
BUN/CREAT SERPL: 16.7 (ref 7–25)
CALCIUM SPEC-SCNC: 9 MG/DL (ref 8.6–10.5)
CHLORIDE SERPL-SCNC: 102 MMOL/L (ref 98–107)
CO2 SERPL-SCNC: 27.6 MMOL/L (ref 22–29)
CREAT SERPL-MCNC: 1.02 MG/DL (ref 0.76–1.27)
DEPRECATED RDW RBC AUTO: 40.9 FL (ref 37–54)
EGFRCR SERPLBLD CKD-EPI 2021: 85.2 ML/MIN/1.73
ERYTHROCYTE [DISTWIDTH] IN BLOOD BY AUTOMATED COUNT: 11.8 % (ref 12.3–15.4)
GLUCOSE SERPL-MCNC: 87 MG/DL (ref 65–99)
HCT VFR BLD AUTO: 41.4 % (ref 37.5–51)
HGB BLD-MCNC: 13.4 G/DL (ref 13–17.7)
MCH RBC QN AUTO: 30.6 PG (ref 26.6–33)
MCHC RBC AUTO-ENTMCNC: 32.4 G/DL (ref 31.5–35.7)
MCV RBC AUTO: 94.5 FL (ref 79–97)
PLATELET # BLD AUTO: 219 10*3/MM3 (ref 140–450)
PMV BLD AUTO: 11 FL (ref 6–12)
POTASSIUM SERPL-SCNC: 4.4 MMOL/L (ref 3.5–5.2)
RBC # BLD AUTO: 4.38 10*6/MM3 (ref 4.14–5.8)
SODIUM SERPL-SCNC: 139 MMOL/L (ref 136–145)
WBC NRBC COR # BLD AUTO: 7.22 10*3/MM3 (ref 3.4–10.8)

## 2024-10-30 PROCEDURE — 36415 COLL VENOUS BLD VENIPUNCTURE: CPT

## 2024-10-30 PROCEDURE — 85027 COMPLETE CBC AUTOMATED: CPT

## 2024-10-30 PROCEDURE — 80048 BASIC METABOLIC PNL TOTAL CA: CPT

## 2024-10-30 NOTE — DISCHARGE INSTRUCTIONS
Take the following medications the morning of surgery:    METOPROLOL    If you are on prescription narcotic pain medication to control your pain you may also take that medication the morning of surgery.      General Instructions:     Do not eat solid food after midnight the night before surgery.  Clear liquids day of surgery are allowed but must be stopped at least two hours before your hospital arrival time.       Allowed clear liquids      Water, sodas, and tea or coffee with no cream or milk added.       12 to 20 ounces of a clear liquid that contains carbohydrates is recommended.  If non-diabetic, have Gatorade or Powerade.  If diabetic, have G2 or Powerade Zero.     Do not have liquids red in color.  Do not consume chicken, beef, pork or vegetable broth or bouillon cubes of any variety as they are not considered clear liquids and are not allowed.      Infants may have breast milk up to four hours before surgery.  Infants drinking formula may drink formula up to six hours before surgery.   Patients who avoid smoking, chewing tobacco and alcohol for 4 weeks prior to surgery have a reduced risk of post-operative complications.  Quit smoking as many days before surgery as you can.  Do not smoke, use chewing tobacco or drink alcohol the day of surgery.   If applicable bring your C-PAP/ BI-PAP machine in with you to preop day of surgery.  Bring any papers given to you in the doctor’s office.  Wear clean comfortable clothes.  Do not wear contact lenses, false eyelashes or make-up.  Bring a case for your glasses.   Bring crutches or walker if applicable.  Remove all piercings.  Leave jewelry and any other valuables at home.  Hair extensions with metal clips must be removed prior to surgery.  The Pre-Admission Testing nurse will instruct you to bring medications if unable to obtain an accurate list in Pre-Admission Testing.      Preventing a Surgical Site Infection:  For 2 to 3 days before surgery, avoid shaving with a  razor because the razor can irritate skin and make it easier to develop an infection.    Any areas of open skin can increase the risk of a post-operative wound infection by allowing bacteria to enter and travel throughout the body.  Notify your surgeon if you have any skin wounds / rashes even if it is not near the expected surgical site.  The area will need assessed to determine if surgery should be delayed until it is healed.  The night prior to surgery shower using a fresh bar of anti-bacterial soap (such as Dial) and clean washcloth.  Sleep in a clean bed with clean clothing.  Do not allow pets to sleep with you.  Shower on the morning of surgery using a fresh bar of anti-bacterial soap (such as Dial) and clean washcloth.  Dry with a clean towel and dress in clean clothing.  Ask your surgeon if you will be receiving antibiotics prior to surgery.  Make sure you, your family, and all healthcare providers clean their hands with soap and water or an alcohol based hand  before caring for you or your wound.    Day of surgery:  Your arrival time is approximately two hours before your scheduled surgery time.  Please note if you have an early arrival time the surgery doors do not open before 5:00 AM.  Upon arrival, a Pre-op nurse and Anesthesiologist will review your health history, obtain vital signs, and answer questions you may have.  The only belongings needed at this time will be a list of your home medications and if applicable your C-PAP/BI-PAP machine.  A Pre-op nurse will start an IV and you may receive medication in preparation for surgery, including something to help you relax.     Please be aware that surgery does come with discomfort.  We want to make every effort to control your discomfort so please discuss any uncontrolled symptoms with your nurse.   Your doctor will most likely have prescribed pain medications.      If you are going home after surgery you will receive individualized written care  instructions before being discharged.  A responsible adult must drive you to and from the hospital on the day of your surgery and ideally stay with you through the night.   .  Discharge prescriptions can be filled by the hospital pharmacy during regular pharmacy hours.  If you are having surgery late in the day/evening your prescription may be e-prescribed to your pharmacy.  Please verify your pharmacy hours or chose a 24 hour pharmacy to avoid not having access to your prescription because your pharmacy has closed for the day.    If you are staying overnight following surgery, you will be transported to your hospital room following the recovery period.  [facility] has all private rooms.    If you have any questions please call Pre-Admission Testing at [phone].  Deductibles and co-payments are collected on the day of service. Please be prepared to pay the required co-pay, deductible or deposit on the day of service as defined by your plan.    Call your surgeon immediately if you experience any of the following symptoms:  Sore Throat  Shortness of Breath or difficulty breathing  Cough  Chills  Body soreness or muscle pain  Headache  Fever  New loss of taste or smell  Do not arrive for your surgery ill.  Your procedure will need to be rescheduled to another time.  You will need to call your physician before the day of surgery to avoid any unnecessary exposure to hospital staff as well as other patients.

## 2024-11-01 NOTE — H&P
"Subjective   History of Present Illness: Abram Allen is a 58 y.o. male is here today for surgical management of his cervical stenosis.    Mr. Allen has persistent left arm weakness which is unchanged.  He completed the EMG/NCV study with Dr. Faith recently who informed him of the results to corroborate nerve root impingement on the left in his neck.  He does not have any pain nor does he have numbness.  He denies any bowel or bladder symptoms.  He denies any right arm symptomatology.    This document is an updated document from my office visits formulated as a history and physical for the purposes of surgery.    History of Present Illness    Tobacco Use: Low Risk  (9/19/2024)    Patient History     Smoking Tobacco Use: Never     Smokeless Tobacco Use: Never     Passive Exposure: Not on file        The following portions of the patient's history were reviewed and updated as appropriate: allergies, current medications, past family history, past medical history, past social history, past surgical history and problem list.    Review of Systems   Constitutional:  Negative for fever.   Musculoskeletal:  Negative for back pain and neck pain.   Neurological:  Positive for weakness (left arm). Negative for numbness.   All other systems reviewed and are negative.      Objective     Vitals:    09/19/24 1303   BP: 122/70   BP Location: Left arm   Patient Position: Sitting   Cuff Size: Adult   Resp: 20   Weight: 88 kg (194 lb)   Height: 182.9 cm (72.01\")   PainSc: 0-No pain     Body mass index is 26.31 kg/m².      Physical Exam  Neurological Exam    Physical Exam:    CONSTITUTIONAL:  appears well developed, well-nourished and in no acute distress.    NECK: the neck is supple and symmetric. The trachea is midline with no masses.      PULMONARY: Respiratory effort is normal with no increased work of breathing or signs of respiratory distress.    CARDIOVASCULAR: Pedal pulses are +2/4 bilaterally. Examination of the " extremities shows no edema or varicosities.    MUSCULOSKELETAL: Gait normal    SKIN: The skin is warm, dry and intact.      NEUROLOGIC:   Cranial Nerves 2-12 intact and his tongue seems to protrude roughly in the midline may be slightly left of center without any fasciculations  Motor strength is definitely weak at 4-/5 in the bicep and the deltoid on the left although it might be considered 3/5 strength as he approaches horizontal with his shoulder he cannot get his left shoulder above horizontal. Muscle bulk and tone reveal atrophy of the left shoulder blade, left deltoid, left bicep  Sensory exam is normal to fine touch to confrontational testing bilaterally  Reflexes on the right side demonstrates 1/4 Triceps Reflex, 2/4 Biceps Reflex, 1/4 Brachioradialis Reflex, 2/4 Knee Jerk Reflex, 1/4 Ankle Jerk Reflex and no ankle clonus on the right.   Reflexes on the left side demonstrates 1/4 Triceps Reflex, 2/4 Biceps Reflex, 1/4 Brachioradialis Reflex, 2/4 Knee Jerk Reflex, 1/4 Ankle Jerk Reflex and no ankle clonus on the left.  Superficial/Primitive Reflexes: primitive reflexes were absent.  Veloz's, Babinski, and Clonus signs all negative.  No coordination deficit observed.  Radicular testing showed a negative Spurling's maneuver  Cortical function is intact and without deficits. Speech is normal.    PSYCHIATRIC: oriented to person, place and time. Patient's mood and affect are normal.    Assessment & Plan   Independent Review of Radiographic Studies:      EMG/NCV study done by Dr. Faith on September 16, 2024 reveals left C5-C6 radiculopathy with chronic features predominating.  Mild left median and ulnar neuropathies at the wrist.    I personally reviewed the images from the following studies.    MRI of the cervical spine done at [facility] on August 21, 2024 reveals advanced multilevel cervical spondylosis.  He has very severe neuroforaminal stenosis to the left due to spondylosis at C4-C5.  Most  pronounced are the changes at C5-C6 and C6-C7.  Myelomalacia of the cervical cord is suspected at C5-C7 due to the spondylosis, cervical stenosis, and cord compression.               Medical Decision Making:      Patient has severe stenosis at C5-C6 and C6-C7 with neuroforaminal stenosis severe on the left at C4-C5 bilateral at C5-C6 as well as moderate neuroforaminal stenosis at C6-C7.  He is symptomatic both clinically and electrodiagnostically involving the C5 and C6 nerve roots on the left with weakness in the deltoid and the bicep that at this point given the amount of atrophy and weakness with the threat to the spinal cord as well as of the right side I believe that surgery is unavoidable to provide adequate decompression and protection of his neurologic elements.  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  It is a bit perplexing that he does not have pain and really has not developed weakness but we have ruled out other explanations for the weakness and the findings of the MRI are very convincing.  Therefore I recommended a 3 level anterior cervical discectomy and fusion using reconstruction with 0 profile interbody device and allograft morselized bone chips.    An anterior cervical discectomy and fusion involves removing the disc and thickened bony tissue adjacent to the disc from an anterior neck approach. At times a partial removal of the vertebral body is required to make sure the spinal nerves are completely decompressed.  This will be required at the disc levels C4-5, C5-C6 and C6-C7 upon completion of the decompression a reconstruction using cadaver bone to fill the space left behind by the removed disc and bone and then stabilizing that construct with a titanium plate and screws. Because of the approach through the neck patients often describe temporary hoarseness or  discomfort with swallowing due to the dissection.    The patient understands that there are inherent risks to surgery including bleeding, infection, anesthetic risk, death, heart attack, stroke, damage to nerves, weakness, numbness, paralysis, failure to improve, need for further surgery, CSF leak, recurrence, persistent pain, and any other unforeseen complications. They comprehend and had opportunity to ask further questions.    He has come off Sincuru for procedures before and he was going to double check with Dr. Hall as far as coming off the Eliquis 5 days before cervical surgery and 48 hours after.  He would like to schedule surgery after he returns from a vacation in October.    Return for follow-up after surgery.    Diagnoses and all orders for this visit:    1. Cervical spondylosis without myelopathy (Primary)  -     Case Request; Standing  -     ceFAZolin (ANCEF) 2 g in sodium chloride 0.9 % 100 mL IVPB  -     Case Request    2. Neuroforaminal stenosis of cervical spine  -     Case Request; Standing  -     ceFAZolin (ANCEF) 2 g in sodium chloride 0.9 % 100 mL IVPB  -     Case Request    3. Left arm weakness  -     Case Request; Standing  -     ceFAZolin (ANCEF) 2 g in sodium chloride 0.9 % 100 mL IVPB  -     Case Request    Other orders  -     Follow Anesthesia Guidelines / Protocol; Future  -     Follow Anesthesia Guidelines / Protocol; Standing  -     Verify NPO Status; Standing  -     SCD (Sequential Compression Device) - To Be Placed on Patient in Pre-Op; Standing  -     Provide Patient With Instructions on NPO Status; Future  -     Inpatient Admission; Standing             Alexis Prado MD [facility]  Neurological Surgery

## 2024-11-04 ENCOUNTER — ANESTHESIA EVENT (OUTPATIENT)
Dept: PERIOP | Facility: HOSPITAL | Age: 59
End: 2024-11-04
Payer: COMMERCIAL

## 2024-11-04 ENCOUNTER — HOSPITAL ENCOUNTER (OUTPATIENT)
Facility: HOSPITAL | Age: 59
Discharge: HOME | End: 2024-11-05
Attending: NEUROLOGICAL SURGERY | Admitting: NEUROLOGICAL SURGERY
Payer: COMMERCIAL

## 2024-11-04 ENCOUNTER — APPOINTMENT (OUTPATIENT)
Dept: GENERAL RADIOLOGY | Facility: HOSPITAL | Age: 59
End: 2024-11-04
Payer: COMMERCIAL

## 2024-11-04 ENCOUNTER — ANESTHESIA (OUTPATIENT)
Dept: PERIOP | Facility: HOSPITAL | Age: 59
End: 2024-11-04
Payer: COMMERCIAL

## 2024-11-04 DIAGNOSIS — M47.812 CERVICAL SPONDYLOSIS WITHOUT MYELOPATHY: ICD-10-CM

## 2024-11-04 DIAGNOSIS — R29.898 LEFT ARM WEAKNESS: ICD-10-CM

## 2024-11-04 DIAGNOSIS — M48.02 NEUROFORAMINAL STENOSIS OF CERVICAL SPINE: ICD-10-CM

## 2024-11-04 LAB
QT INTERVAL: 487 MS
QTC INTERVAL: 503 MS

## 2024-11-04 PROCEDURE — 25010000002 PHENYLEPHRINE 10 MG/ML SOLUTION: Performed by: NURSE ANESTHETIST, CERTIFIED REGISTERED

## 2024-11-04 PROCEDURE — 25010000002 MAGNESIUM SULFATE PER 500 MG OF MAGNESIUM: Performed by: NURSE ANESTHETIST, CERTIFIED REGISTERED

## 2024-11-04 PROCEDURE — 25010000002 LIDOCAINE 2% SOLUTION: Performed by: NURSE ANESTHETIST, CERTIFIED REGISTERED

## 2024-11-04 PROCEDURE — 25010000002 ONDANSETRON PER 1 MG: Performed by: NURSE ANESTHETIST, CERTIFIED REGISTERED

## 2024-11-04 PROCEDURE — 25010000002 VASOPRESSIN 20 UNIT/ML SOLUTION: Performed by: NURSE ANESTHETIST, CERTIFIED REGISTERED

## 2024-11-04 PROCEDURE — 25010000002 CEFAZOLIN PER 500 MG: Performed by: NEUROLOGICAL SURGERY

## 2024-11-04 PROCEDURE — 25010000002 HYDROMORPHONE PER 4 MG: Performed by: NURSE ANESTHETIST, CERTIFIED REGISTERED

## 2024-11-04 PROCEDURE — C1713 ANCHOR/SCREW BN/BN,TIS/BN: HCPCS | Performed by: NEUROLOGICAL SURGERY

## 2024-11-04 PROCEDURE — 22853 INSJ BIOMECHANICAL DEVICE: CPT | Performed by: NEUROLOGICAL SURGERY

## 2024-11-04 PROCEDURE — 25010000002 SUGAMMADEX 200 MG/2ML SOLUTION: Performed by: NURSE ANESTHETIST, CERTIFIED REGISTERED

## 2024-11-04 PROCEDURE — 25810000003 SODIUM CHLORIDE 0.9 % SOLUTION 250 ML FLEX CONT: Performed by: NURSE ANESTHETIST, CERTIFIED REGISTERED

## 2024-11-04 PROCEDURE — 25810000003 LACTATED RINGERS PER 1000 ML: Performed by: NURSE ANESTHETIST, CERTIFIED REGISTERED

## 2024-11-04 PROCEDURE — 25010000002 PHENYLEPHRINE 10 MG/ML SOLUTION 5 ML VIAL: Performed by: NURSE ANESTHETIST, CERTIFIED REGISTERED

## 2024-11-04 PROCEDURE — 25010000002 DROPERIDOL PER 5 MG: Performed by: NURSE ANESTHETIST, CERTIFIED REGISTERED

## 2024-11-04 PROCEDURE — 93010 ELECTROCARDIOGRAM REPORT: CPT | Performed by: INTERNAL MEDICINE

## 2024-11-04 PROCEDURE — 22552 ARTHRD ANT NTRBD CERVICAL EA: CPT | Performed by: NEUROLOGICAL SURGERY

## 2024-11-04 PROCEDURE — 22551 ARTHRD ANT NTRBDY CERVICAL: CPT | Performed by: SPECIALIST/TECHNOLOGIST, OTHER

## 2024-11-04 PROCEDURE — 93005 ELECTROCARDIOGRAM TRACING: CPT | Performed by: NURSE ANESTHETIST, CERTIFIED REGISTERED

## 2024-11-04 PROCEDURE — L0120 CERV FLEX N/ADJ FOAM PRE OTS: HCPCS | Performed by: NEUROLOGICAL SURGERY

## 2024-11-04 PROCEDURE — 25010000002 ESMOLOL 100 MG/10ML SOLUTION: Performed by: NURSE ANESTHETIST, CERTIFIED REGISTERED

## 2024-11-04 PROCEDURE — 22552 ARTHRD ANT NTRBD CERVICAL EA: CPT | Performed by: SPECIALIST/TECHNOLOGIST, OTHER

## 2024-11-04 PROCEDURE — 25010000002 FENTANYL CITRATE (PF) 50 MCG/ML SOLUTION: Performed by: NURSE ANESTHETIST, CERTIFIED REGISTERED

## 2024-11-04 PROCEDURE — 25010000002 PROPOFOL 10 MG/ML EMULSION: Performed by: NURSE ANESTHETIST, CERTIFIED REGISTERED

## 2024-11-04 PROCEDURE — 25010000002 SODIUM CHLORIDE 0.9 % WITH KCL 20 MEQ 20-0.9 MEQ/L-% SOLUTION: Performed by: NEUROLOGICAL SURGERY

## 2024-11-04 PROCEDURE — 22853 INSJ BIOMECHANICAL DEVICE: CPT | Performed by: SPECIALIST/TECHNOLOGIST, OTHER

## 2024-11-04 PROCEDURE — 22551 ARTHRD ANT NTRBDY CERVICAL: CPT | Performed by: NEUROLOGICAL SURGERY

## 2024-11-04 PROCEDURE — 76000 FLUOROSCOPY <1 HR PHYS/QHP: CPT

## 2024-11-04 PROCEDURE — 25810000003 LACTATED RINGERS PER 1000 ML: Performed by: ANESTHESIOLOGY

## 2024-11-04 DEVICE — SCRW CERV ZERO/P VA TI SD 3.7X16MM PRP: Type: IMPLANTABLE DEVICE | Site: SPINE CERVICAL | Status: FUNCTIONAL

## 2024-11-04 DEVICE — PLT ZERO/P VA PARL PEEK SPACR LRD TI 7MM: Type: IMPLANTABLE DEVICE | Site: SPINE CERVICAL | Status: FUNCTIONAL

## 2024-11-04 DEVICE — MEDLINE BONEWAX YELLOW
Type: IMPLANTABLE DEVICE | Site: SPINE CERVICAL | Status: FUNCTIONAL
Brand: MEDLINE BONEWAX YELLOW

## 2024-11-04 DEVICE — ALLOGRFT BONE VIVIGEN CELLUAR MATRX FORMABLE 1CC: Type: IMPLANTABLE DEVICE | Site: SPINE CERVICAL | Status: FUNCTIONAL

## 2024-11-04 DEVICE — FLOSEAL WITH RECOTHROM - 10ML.
Type: IMPLANTABLE DEVICE | Site: SPINE CERVICAL | Status: FUNCTIONAL
Brand: FLOSEAL HEMOSTATIC MATRIX

## 2024-11-04 DEVICE — IMPLANTABLE DEVICE: Type: IMPLANTABLE DEVICE | Site: SPINE CERVICAL | Status: FUNCTIONAL

## 2024-11-04 RX ORDER — KETOROLAC TROMETHAMINE 15 MG/ML
15 INJECTION, SOLUTION INTRAMUSCULAR; INTRAVENOUS EVERY 6 HOURS PRN
Status: DISCONTINUED | OUTPATIENT
Start: 2024-11-04 | End: 2024-11-05 | Stop reason: HOSPADM

## 2024-11-04 RX ORDER — PROPOFOL 10 MG/ML
VIAL (ML) INTRAVENOUS AS NEEDED
Status: DISCONTINUED | OUTPATIENT
Start: 2024-11-04 | End: 2024-11-04 | Stop reason: SURG

## 2024-11-04 RX ORDER — HYDROCODONE BITARTRATE AND ACETAMINOPHEN 5; 325 MG/1; MG/1
1 TABLET ORAL ONCE AS NEEDED
Status: DISCONTINUED | OUTPATIENT
Start: 2024-11-04 | End: 2024-11-04 | Stop reason: HOSPADM

## 2024-11-04 RX ORDER — ONDANSETRON 2 MG/ML
4 INJECTION INTRAMUSCULAR; INTRAVENOUS EVERY 6 HOURS PRN
Status: DISCONTINUED | OUTPATIENT
Start: 2024-11-04 | End: 2024-11-05 | Stop reason: HOSPADM

## 2024-11-04 RX ORDER — PROMETHAZINE HYDROCHLORIDE 25 MG/1
25 TABLET ORAL ONCE AS NEEDED
Status: DISCONTINUED | OUTPATIENT
Start: 2024-11-04 | End: 2024-11-04 | Stop reason: HOSPADM

## 2024-11-04 RX ORDER — LABETALOL HYDROCHLORIDE 5 MG/ML
5 INJECTION, SOLUTION INTRAVENOUS
Status: DISCONTINUED | OUTPATIENT
Start: 2024-11-04 | End: 2024-11-04 | Stop reason: HOSPADM

## 2024-11-04 RX ORDER — HYDROMORPHONE HYDROCHLORIDE 1 MG/ML
0.5 INJECTION, SOLUTION INTRAMUSCULAR; INTRAVENOUS; SUBCUTANEOUS
Status: DISCONTINUED | OUTPATIENT
Start: 2024-11-04 | End: 2024-11-04 | Stop reason: HOSPADM

## 2024-11-04 RX ORDER — SODIUM CHLORIDE 9 MG/ML
40 INJECTION, SOLUTION INTRAVENOUS AS NEEDED
Status: DISCONTINUED | OUTPATIENT
Start: 2024-11-04 | End: 2024-11-05 | Stop reason: HOSPADM

## 2024-11-04 RX ORDER — ACETAMINOPHEN 325 MG/1
650 TABLET ORAL EVERY 8 HOURS
Status: DISCONTINUED | OUTPATIENT
Start: 2024-11-04 | End: 2024-11-05 | Stop reason: HOSPADM

## 2024-11-04 RX ORDER — ONDANSETRON 2 MG/ML
4 INJECTION INTRAMUSCULAR; INTRAVENOUS ONCE AS NEEDED
Status: COMPLETED | OUTPATIENT
Start: 2024-11-04 | End: 2024-11-04

## 2024-11-04 RX ORDER — IPRATROPIUM BROMIDE AND ALBUTEROL SULFATE 2.5; .5 MG/3ML; MG/3ML
3 SOLUTION RESPIRATORY (INHALATION) ONCE AS NEEDED
Status: DISCONTINUED | OUTPATIENT
Start: 2024-11-04 | End: 2024-11-04 | Stop reason: HOSPADM

## 2024-11-04 RX ORDER — NALOXONE HCL 0.4 MG/ML
0.4 VIAL (ML) INJECTION
Status: DISCONTINUED | OUTPATIENT
Start: 2024-11-04 | End: 2024-11-05 | Stop reason: HOSPADM

## 2024-11-04 RX ORDER — BISACODYL 5 MG/1
5 TABLET, DELAYED RELEASE ORAL DAILY PRN
Status: DISCONTINUED | OUTPATIENT
Start: 2024-11-04 | End: 2024-11-05 | Stop reason: HOSPADM

## 2024-11-04 RX ORDER — POLYETHYLENE GLYCOL 3350 17 G/17G
17 POWDER, FOR SOLUTION ORAL DAILY PRN
Status: DISCONTINUED | OUTPATIENT
Start: 2024-11-04 | End: 2024-11-05 | Stop reason: HOSPADM

## 2024-11-04 RX ORDER — AMOXICILLIN 250 MG
2 CAPSULE ORAL 2 TIMES DAILY PRN
Status: DISCONTINUED | OUTPATIENT
Start: 2024-11-04 | End: 2024-11-05 | Stop reason: HOSPADM

## 2024-11-04 RX ORDER — SODIUM CHLORIDE 0.9 % (FLUSH) 0.9 %
10 SYRINGE (ML) INJECTION AS NEEDED
Status: DISCONTINUED | OUTPATIENT
Start: 2024-11-04 | End: 2024-11-05 | Stop reason: HOSPADM

## 2024-11-04 RX ORDER — SODIUM CHLORIDE AND POTASSIUM CHLORIDE 150; 900 MG/100ML; MG/100ML
75 INJECTION, SOLUTION INTRAVENOUS CONTINUOUS
Status: DISCONTINUED | OUTPATIENT
Start: 2024-11-04 | End: 2024-11-05 | Stop reason: HOSPADM

## 2024-11-04 RX ORDER — HYDROCODONE BITARTRATE AND ACETAMINOPHEN 7.5; 325 MG/1; MG/1
1 TABLET ORAL EVERY 4 HOURS PRN
Status: DISCONTINUED | OUTPATIENT
Start: 2024-11-04 | End: 2024-11-05 | Stop reason: HOSPADM

## 2024-11-04 RX ORDER — DIPHENHYDRAMINE HYDROCHLORIDE 50 MG/ML
12.5 INJECTION INTRAMUSCULAR; INTRAVENOUS
Status: DISCONTINUED | OUTPATIENT
Start: 2024-11-04 | End: 2024-11-04 | Stop reason: HOSPADM

## 2024-11-04 RX ORDER — FENTANYL CITRATE 50 UG/ML
INJECTION, SOLUTION INTRAMUSCULAR; INTRAVENOUS AS NEEDED
Status: DISCONTINUED | OUTPATIENT
Start: 2024-11-04 | End: 2024-11-04 | Stop reason: SURG

## 2024-11-04 RX ORDER — MAGNESIUM HYDROXIDE 1200 MG/15ML
LIQUID ORAL AS NEEDED
Status: DISCONTINUED | OUTPATIENT
Start: 2024-11-04 | End: 2024-11-04 | Stop reason: HOSPADM

## 2024-11-04 RX ORDER — METOPROLOL TARTRATE 50 MG
50 TABLET ORAL 2 TIMES DAILY
Status: DISCONTINUED | OUTPATIENT
Start: 2024-11-04 | End: 2024-11-05 | Stop reason: HOSPADM

## 2024-11-04 RX ORDER — ROCURONIUM BROMIDE 10 MG/ML
INJECTION, SOLUTION INTRAVENOUS AS NEEDED
Status: DISCONTINUED | OUTPATIENT
Start: 2024-11-04 | End: 2024-11-04 | Stop reason: SURG

## 2024-11-04 RX ORDER — ONDANSETRON 2 MG/ML
INJECTION INTRAMUSCULAR; INTRAVENOUS AS NEEDED
Status: DISCONTINUED | OUTPATIENT
Start: 2024-11-04 | End: 2024-11-04 | Stop reason: SURG

## 2024-11-04 RX ORDER — ONDANSETRON 4 MG/1
4 TABLET, ORALLY DISINTEGRATING ORAL EVERY 6 HOURS PRN
Status: DISCONTINUED | OUTPATIENT
Start: 2024-11-04 | End: 2024-11-05 | Stop reason: HOSPADM

## 2024-11-04 RX ORDER — SODIUM CHLORIDE, SODIUM LACTATE, POTASSIUM CHLORIDE, CALCIUM CHLORIDE 600; 310; 30; 20 MG/100ML; MG/100ML; MG/100ML; MG/100ML
INJECTION, SOLUTION INTRAVENOUS CONTINUOUS PRN
Status: DISCONTINUED | OUTPATIENT
Start: 2024-11-04 | End: 2024-11-04 | Stop reason: SURG

## 2024-11-04 RX ORDER — SODIUM CHLORIDE 0.9 % (FLUSH) 0.9 %
3 SYRINGE (ML) INJECTION EVERY 12 HOURS SCHEDULED
Status: DISCONTINUED | OUTPATIENT
Start: 2024-11-04 | End: 2024-11-04 | Stop reason: HOSPADM

## 2024-11-04 RX ORDER — FLUMAZENIL 0.1 MG/ML
0.2 INJECTION INTRAVENOUS AS NEEDED
Status: DISCONTINUED | OUTPATIENT
Start: 2024-11-04 | End: 2024-11-04 | Stop reason: HOSPADM

## 2024-11-04 RX ORDER — SODIUM CHLORIDE, SODIUM LACTATE, POTASSIUM CHLORIDE, CALCIUM CHLORIDE 600; 310; 30; 20 MG/100ML; MG/100ML; MG/100ML; MG/100ML
9 INJECTION, SOLUTION INTRAVENOUS CONTINUOUS
Status: ACTIVE | OUTPATIENT
Start: 2024-11-04 | End: 2024-11-05

## 2024-11-04 RX ORDER — ACETAMINOPHEN 160 MG/5ML
650 SOLUTION ORAL EVERY 8 HOURS
Status: DISCONTINUED | OUTPATIENT
Start: 2024-11-04 | End: 2024-11-05 | Stop reason: HOSPADM

## 2024-11-04 RX ORDER — VASOPRESSIN 20 U/ML
INJECTION PARENTERAL AS NEEDED
Status: DISCONTINUED | OUTPATIENT
Start: 2024-11-04 | End: 2024-11-04 | Stop reason: SURG

## 2024-11-04 RX ORDER — OXYCODONE AND ACETAMINOPHEN 7.5; 325 MG/1; MG/1
1 TABLET ORAL EVERY 4 HOURS PRN
Status: DISCONTINUED | OUTPATIENT
Start: 2024-11-04 | End: 2024-11-04 | Stop reason: HOSPADM

## 2024-11-04 RX ORDER — ROSUVASTATIN CALCIUM 40 MG/1
40 TABLET, COATED ORAL NIGHTLY
Status: DISCONTINUED | OUTPATIENT
Start: 2024-11-04 | End: 2024-11-05 | Stop reason: HOSPADM

## 2024-11-04 RX ORDER — SODIUM CHLORIDE 0.9 % (FLUSH) 0.9 %
3 SYRINGE (ML) INJECTION EVERY 12 HOURS SCHEDULED
Status: DISCONTINUED | OUTPATIENT
Start: 2024-11-04 | End: 2024-11-05 | Stop reason: HOSPADM

## 2024-11-04 RX ORDER — ACETAMINOPHEN 650 MG/1
650 SUPPOSITORY RECTAL EVERY 8 HOURS
Status: DISCONTINUED | OUTPATIENT
Start: 2024-11-04 | End: 2024-11-05 | Stop reason: HOSPADM

## 2024-11-04 RX ORDER — HYDRALAZINE HYDROCHLORIDE 20 MG/ML
5 INJECTION INTRAMUSCULAR; INTRAVENOUS
Status: DISCONTINUED | OUTPATIENT
Start: 2024-11-04 | End: 2024-11-04 | Stop reason: HOSPADM

## 2024-11-04 RX ORDER — DROPERIDOL 2.5 MG/ML
0.62 INJECTION, SOLUTION INTRAMUSCULAR; INTRAVENOUS
Status: DISCONTINUED | OUTPATIENT
Start: 2024-11-04 | End: 2024-11-04 | Stop reason: HOSPADM

## 2024-11-04 RX ORDER — CYCLOBENZAPRINE HCL 10 MG
10 TABLET ORAL 3 TIMES DAILY PRN
Status: DISCONTINUED | OUTPATIENT
Start: 2024-11-04 | End: 2024-11-05 | Stop reason: HOSPADM

## 2024-11-04 RX ORDER — FENTANYL CITRATE 50 UG/ML
50 INJECTION, SOLUTION INTRAMUSCULAR; INTRAVENOUS
Status: DISCONTINUED | OUTPATIENT
Start: 2024-11-04 | End: 2024-11-04 | Stop reason: HOSPADM

## 2024-11-04 RX ORDER — ESMOLOL HYDROCHLORIDE 10 MG/ML
INJECTION INTRAVENOUS AS NEEDED
Status: DISCONTINUED | OUTPATIENT
Start: 2024-11-04 | End: 2024-11-04 | Stop reason: SURG

## 2024-11-04 RX ORDER — HYDROCODONE BITARTRATE AND ACETAMINOPHEN 5; 325 MG/1; MG/1
1 TABLET ORAL EVERY 4 HOURS PRN
Status: DISCONTINUED | OUTPATIENT
Start: 2024-11-04 | End: 2024-11-05 | Stop reason: HOSPADM

## 2024-11-04 RX ORDER — LIDOCAINE HYDROCHLORIDE 20 MG/ML
INJECTION, SOLUTION INFILTRATION; PERINEURAL AS NEEDED
Status: DISCONTINUED | OUTPATIENT
Start: 2024-11-04 | End: 2024-11-04 | Stop reason: SURG

## 2024-11-04 RX ORDER — LIDOCAINE HYDROCHLORIDE 10 MG/ML
0.5 INJECTION, SOLUTION INFILTRATION; PERINEURAL ONCE AS NEEDED
Status: DISCONTINUED | OUTPATIENT
Start: 2024-11-04 | End: 2024-11-04 | Stop reason: HOSPADM

## 2024-11-04 RX ORDER — MIDAZOLAM HYDROCHLORIDE 1 MG/ML
1 INJECTION, SOLUTION INTRAMUSCULAR; INTRAVENOUS
Status: DISCONTINUED | OUTPATIENT
Start: 2024-11-04 | End: 2024-11-04 | Stop reason: HOSPADM

## 2024-11-04 RX ORDER — PHENYLEPHRINE HYDROCHLORIDE 10 MG/ML
INJECTION INTRAVENOUS AS NEEDED
Status: DISCONTINUED | OUTPATIENT
Start: 2024-11-04 | End: 2024-11-04 | Stop reason: SURG

## 2024-11-04 RX ORDER — NALOXONE HCL 0.4 MG/ML
0.2 VIAL (ML) INJECTION AS NEEDED
Status: DISCONTINUED | OUTPATIENT
Start: 2024-11-04 | End: 2024-11-04 | Stop reason: HOSPADM

## 2024-11-04 RX ORDER — SODIUM CHLORIDE 0.9 % (FLUSH) 0.9 %
3-10 SYRINGE (ML) INJECTION AS NEEDED
Status: DISCONTINUED | OUTPATIENT
Start: 2024-11-04 | End: 2024-11-04 | Stop reason: HOSPADM

## 2024-11-04 RX ORDER — MORPHINE SULFATE 2 MG/ML
1 INJECTION, SOLUTION INTRAMUSCULAR; INTRAVENOUS
Status: DISCONTINUED | OUTPATIENT
Start: 2024-11-04 | End: 2024-11-05 | Stop reason: HOSPADM

## 2024-11-04 RX ORDER — FENTANYL CITRATE 50 UG/ML
50 INJECTION, SOLUTION INTRAMUSCULAR; INTRAVENOUS ONCE AS NEEDED
Status: DISCONTINUED | OUTPATIENT
Start: 2024-11-04 | End: 2024-11-04 | Stop reason: HOSPADM

## 2024-11-04 RX ORDER — FAMOTIDINE 10 MG/ML
20 INJECTION, SOLUTION INTRAVENOUS ONCE
Status: COMPLETED | OUTPATIENT
Start: 2024-11-04 | End: 2024-11-04

## 2024-11-04 RX ORDER — MAGNESIUM SULFATE HEPTAHYDRATE 500 MG/ML
INJECTION, SOLUTION INTRAMUSCULAR; INTRAVENOUS AS NEEDED
Status: DISCONTINUED | OUTPATIENT
Start: 2024-11-04 | End: 2024-11-04 | Stop reason: SURG

## 2024-11-04 RX ORDER — PROMETHAZINE HYDROCHLORIDE 25 MG/1
25 SUPPOSITORY RECTAL ONCE AS NEEDED
Status: DISCONTINUED | OUTPATIENT
Start: 2024-11-04 | End: 2024-11-04 | Stop reason: HOSPADM

## 2024-11-04 RX ORDER — EPHEDRINE SULFATE 50 MG/ML
5 INJECTION, SOLUTION INTRAVENOUS ONCE AS NEEDED
Status: DISCONTINUED | OUTPATIENT
Start: 2024-11-04 | End: 2024-11-04 | Stop reason: HOSPADM

## 2024-11-04 RX ORDER — BISACODYL 10 MG
10 SUPPOSITORY, RECTAL RECTAL DAILY PRN
Status: DISCONTINUED | OUTPATIENT
Start: 2024-11-04 | End: 2024-11-05 | Stop reason: HOSPADM

## 2024-11-04 RX ADMIN — ROCURONIUM BROMIDE 20 MG: 10 INJECTION, SOLUTION INTRAVENOUS at 08:15

## 2024-11-04 RX ADMIN — PHENYLEPHRINE HYDROCHLORIDE 100 MCG: 10 INJECTION INTRAVENOUS at 07:49

## 2024-11-04 RX ADMIN — POTASSIUM CHLORIDE AND SODIUM CHLORIDE 75 ML/HR: 900; 150 INJECTION, SOLUTION INTRAVENOUS at 14:50

## 2024-11-04 RX ADMIN — PHENYLEPHRINE HYDROCHLORIDE 100 MCG: 10 INJECTION INTRAVENOUS at 07:41

## 2024-11-04 RX ADMIN — FAMOTIDINE 20 MG: 10 INJECTION INTRAVENOUS at 07:12

## 2024-11-04 RX ADMIN — ONDANSETRON 4 MG: 2 INJECTION, SOLUTION INTRAMUSCULAR; INTRAVENOUS at 11:13

## 2024-11-04 RX ADMIN — PHENYLEPHRINE HYDROCHLORIDE 200 MCG: 10 INJECTION INTRAVENOUS at 08:41

## 2024-11-04 RX ADMIN — PHENYLEPHRINE HYDROCHLORIDE 200 MCG: 10 INJECTION INTRAVENOUS at 07:53

## 2024-11-04 RX ADMIN — MAGNESIUM SULFATE HEPTAHYDRATE 2 G: 500 INJECTION, SOLUTION INTRAMUSCULAR; INTRAVENOUS at 08:08

## 2024-11-04 RX ADMIN — FENTANYL CITRATE 50 MCG: 50 INJECTION INTRAMUSCULAR; INTRAVENOUS at 07:33

## 2024-11-04 RX ADMIN — LIDOCAINE HYDROCHLORIDE 100 MG: 20 INJECTION, SOLUTION INFILTRATION; PERINEURAL at 07:33

## 2024-11-04 RX ADMIN — ROSUVASTATIN CALCIUM 40 MG: 40 TABLET, FILM COATED ORAL at 20:33

## 2024-11-04 RX ADMIN — HYDROCODONE BITARTRATE AND ACETAMINOPHEN 1 TABLET: 5; 325 TABLET ORAL at 14:57

## 2024-11-04 RX ADMIN — ESMOLOL HYDROCHLORIDE 50 MG: 100 INJECTION, SOLUTION INTRAVENOUS at 07:42

## 2024-11-04 RX ADMIN — Medication 3 ML: at 15:04

## 2024-11-04 RX ADMIN — HYDROMORPHONE HYDROCHLORIDE 0.5 MG: 1 INJECTION, SOLUTION INTRAMUSCULAR; INTRAVENOUS; SUBCUTANEOUS at 11:22

## 2024-11-04 RX ADMIN — HYDROMORPHONE HYDROCHLORIDE 0.5 MG: 1 INJECTION, SOLUTION INTRAMUSCULAR; INTRAVENOUS; SUBCUTANEOUS at 11:37

## 2024-11-04 RX ADMIN — FENTANYL CITRATE 50 MCG: 50 INJECTION INTRAMUSCULAR; INTRAVENOUS at 10:20

## 2024-11-04 RX ADMIN — PROPOFOL 140 MG: 10 INJECTION, EMULSION INTRAVENOUS at 07:33

## 2024-11-04 RX ADMIN — SODIUM CHLORIDE 2 G: 900 INJECTION INTRAVENOUS at 07:20

## 2024-11-04 RX ADMIN — METOPROLOL TARTRATE 50 MG: 50 TABLET, FILM COATED ORAL at 20:33

## 2024-11-04 RX ADMIN — HYDROCODONE BITARTRATE AND ACETAMINOPHEN 1 TABLET: 5; 325 TABLET ORAL at 20:33

## 2024-11-04 RX ADMIN — ROCURONIUM BROMIDE 20 MG: 10 INJECTION, SOLUTION INTRAVENOUS at 09:17

## 2024-11-04 RX ADMIN — ONDANSETRON 4 MG: 2 INJECTION INTRAMUSCULAR; INTRAVENOUS at 10:44

## 2024-11-04 RX ADMIN — SODIUM CHLORIDE, POTASSIUM CHLORIDE, SODIUM LACTATE AND CALCIUM CHLORIDE: 600; 310; 30; 20 INJECTION, SOLUTION INTRAVENOUS at 10:59

## 2024-11-04 RX ADMIN — Medication 3 ML: at 20:42

## 2024-11-04 RX ADMIN — ACETAMINOPHEN 325MG 650 MG: 325 TABLET ORAL at 22:35

## 2024-11-04 RX ADMIN — PHENYLEPHRINE HYDROCHLORIDE 200 MCG: 10 INJECTION INTRAVENOUS at 08:04

## 2024-11-04 RX ADMIN — PHENYLEPHRINE HYDROCHLORIDE 200 MCG: 10 INJECTION INTRAVENOUS at 08:06

## 2024-11-04 RX ADMIN — HYDROMORPHONE HYDROCHLORIDE 0.5 MG: 1 INJECTION, SOLUTION INTRAMUSCULAR; INTRAVENOUS; SUBCUTANEOUS at 12:22

## 2024-11-04 RX ADMIN — ROCURONIUM BROMIDE 20 MG: 10 INJECTION, SOLUTION INTRAVENOUS at 10:20

## 2024-11-04 RX ADMIN — VASOPRESSIN 1 UNITS: 20 INJECTION, SOLUTION INTRAVENOUS at 09:20

## 2024-11-04 RX ADMIN — DROPERIDOL 0.62 MG: 2.5 INJECTION, SOLUTION INTRAMUSCULAR; INTRAVENOUS at 11:18

## 2024-11-04 RX ADMIN — SUGAMMADEX 200 MG: 100 INJECTION, SOLUTION INTRAVENOUS at 10:52

## 2024-11-04 RX ADMIN — PHENYLEPHRINE HYDROCHLORIDE 100 MCG: 10 INJECTION INTRAVENOUS at 07:39

## 2024-11-04 RX ADMIN — ROCURONIUM BROMIDE 50 MG: 10 INJECTION, SOLUTION INTRAVENOUS at 07:33

## 2024-11-04 RX ADMIN — SODIUM CHLORIDE, SODIUM LACTATE, POTASSIUM CHLORIDE, CALCIUM CHLORIDE 9 ML/HR: 20; 30; 600; 310 INJECTION, SOLUTION INTRAVENOUS at 06:59

## 2024-11-04 RX ADMIN — SODIUM CHLORIDE, POTASSIUM CHLORIDE, SODIUM LACTATE AND CALCIUM CHLORIDE: 600; 310; 30; 20 INJECTION, SOLUTION INTRAVENOUS at 07:25

## 2024-11-04 RX ADMIN — PHENYLEPHRINE HYDROCHLORIDE 0.5 MCG/KG/MIN: 10 INJECTION, SOLUTION INTRAVENOUS at 08:04

## 2024-11-04 RX ADMIN — ACETAMINOPHEN 325MG 650 MG: 325 TABLET ORAL at 14:50

## 2024-11-04 NOTE — ADDENDUM NOTE
Addendum  created 11/04/24 1113 by Alejandro Esparza MD    Attestation recorded in Intraprocedure, Intraprocedure Attestations filed

## 2024-11-04 NOTE — ANESTHESIA POSTPROCEDURE EVALUATION
Patient: Abram Allen    Procedure Summary       Date: 11/04/24 Room / Location: [facility] OR [address] [place] [address] MAIN OR    Anesthesia Start: 0725 Anesthesia Stop: 1106    Procedure: Anterior cervical discectomy and fusion at cervical 4 cervical 5, cervical 5 cervical 6, cervical 6 cervical 7 with allograft (cadaver) bone fusion with 0 profile interbody mechanical device at each level (Bilateral: Spine Cervical) Diagnosis:       Cervical spondylosis without myelopathy      Neuroforaminal stenosis of cervical spine      Left arm weakness      (Cervical spondylosis without myelopathy [M47.812])      (Neuroforaminal stenosis of cervical spine [M48.02])      (Left arm weakness [R29.898])    Surgeons: Alexis Prado MD Provider: Alejandro Esparza MD    Anesthesia Type: general ASA Status: 3            Anesthesia Type: general    Vitals  Vitals Value Taken Time   /62 11/04/24 1105   Temp     Pulse 126 11/04/24 1108   Resp     SpO2 100 % 11/04/24 1108   Vitals shown include unfiled device data.        Anesthesia Post Evaluation

## 2024-11-04 NOTE — ANESTHESIA PROCEDURE NOTES
Airway  Urgency: elective    Date/Time: 11/4/2024 7:35 AM  Airway not difficult    General Information and Staff    Patient location during procedure: OR  Anesthesiologist: Alejandro Esparza MD  CRNA/CAA: Verona Lund CRNA    Indications and Patient Condition  Indications for airway management: airway protection    Preoxygenated: yes  MILS not maintained throughout      Final Airway Details  Final airway type: endotracheal airway      Successful airway: ETT  Cuffed: yes   Successful intubation technique: direct laryngoscopy  Facilitating devices/methods: anterior pressure/BURP  Endotracheal tube insertion site: oral  Blade: Nat  Blade size: 3  ETT size (mm): 7.5  Cormack-Lehane Classification: grade I - full view of glottis  Placement verified by: chest auscultation and capnometry   Cuff volume (mL): 7  Measured from: lips  ETT/EBT  to lips (cm): 22  Number of attempts at approach: 1  Assessment: lips, teeth, and gum same as pre-op and atraumatic intubation

## 2024-11-04 NOTE — PLAN OF CARE
Goal Outcome Evaluation:  Plan of Care Reviewed With: patient        Progress: improving  Outcome Evaluation: VSS. NVI. dressing CDI. ice collar in place. standby assist BRP. pain managed with PO medication. planning to d/c home tomorrow if medically stable.

## 2024-11-04 NOTE — OP NOTE
Anterior Cervical Discectomies C4C5, C5C6, C6C7 with allograft morselized bone graft and Depuy Spine Zero Profile interbody device fusion procedure note    Abram Allen  11/4/2024  [id number]    SURGEON  Alexis Prado MD    ASSISTANT:  Danna Alfaro CSA was present throughout the entire surgery to provide intraoperative suction, retraction, suturing, and irrigation to promote visualization by the operative surgeon and assist with opening and closure.  The skilled assistance was necessary for the success of this case.  Our practice does not have a relationship with neurosurgical residents.    INDICATION:  Cervical spondylosis with cord contact at C5-C6 and C6-C7 with patient presents with chronic onset of left upper extremity weakness referable to the left C4-C5 and C5-C6 levels given the progressive weakness with failure of conservative management and operative intervention is offered to preserve neurologic function and protect the spinal cord.    Pre-op Diagnosis:     Cervical spondylosis without myelopathy [M47.812]  Neuroforaminal stenosis of cervical spine [M48.02]  Left arm weakness [R29.898]    Pre-op Diagnosis:    Cervical spondylosis without myelopathy [M47.812]  Neuroforaminal stenosis of cervical spine [M48.02]  Left arm weakness [R29.898]    PROCEDURE PERFORMED:    Anterior Cervical Discectomy C4C5, C5C6, C6C7 with Instrumented Fusion using a zero profile interbody fixation device from DePuy spine and Vivagen allograft     1. Arthrodesis, anterior interbody, including disc space preparation, discectomy, osteophytectomy and decompression of spinal cord and/or nerve roots at C4C5  2.  Arthrodesis, anterior interbody, including disc space preparation, discectomy, osteophytectomy and decompression of spinal cord and/or nerve roots (additional level) at C5C6  3.  Arthrodesis, anterior interbody, including disc space preparation, discectomy, osteophytectomy and decompression of spinal cord  and/or nerve roots (additional level) at C6C7  4.  Insertion of interbody biomechanical device DePuy spine ZeroP titanium and PEEK implant placed in conjunction with interbody arthrodesis X 4 levels  5.  Morcellized allograft bone arthrodesis via Truviso bone product  6. Microdissection technique with the use of the operating microscope     Spinal Surgery Levels Completed:3 Levels     Anesthesia: General    Staff:   Circulator: Glynn Elias RN; Verona Brian RN  Scrub Person: Lisseth Mcconnell; Anna Marie Fitzpatrick  Vendor Representative: Vidal Mata  Assistant: Danna Alfaro CSA    Estimated Blood Loss:  100ml    Specimens: None         Drains:   [REMOVED] Urethral Catheter Silicone 16 Fr. (Removed)       Findings: Severe bilateral neuroforaminal and central spinal stenosis with cord compression at C5-C6, right greater than left neuroforaminal stenosis with central cord compression at C6-C7, and severe left neuroforaminal stenosis at C6-C7    Complications: None immediate    Narrative Description:    Positioning: After operative consent the patient was taken to the operating room in stable condition and placed under general endotracheal anesthesia. Once adequate anesthesia was established the patient was kept supine on the operating table with a shoulder roll beneath their shoulders. Their neck was placed in a slightly extended position to expose the anterior neck which was prepped and draped in the usual sterile fashion. All pressure points were padded along the extremities to protect neurologic function.  Once the patient was positioned a Peters catheter was inserted due to the expected length of the surgery.  The neck was prepped and draped in the usual sterile fashion along with the Intra-Op fluoroscopy machine and intraoperative microscope.     Approach: Using intraoperative fluoroscopy the anterior cervical spine was localized. The incision was made in the mid neck on the right and taken  through the platysma layer. Blunt dissection was then used to expose the anterior cervical spine medial to the carotid artery and lateral to the esophagus. A needle was placed in C5C6 to confirm the levels fluoroscopically. The Trimline retractor was placed beneath the longus colli muscles bilaterally. The distraction pins, of 14 mm depth were sequentially placed across the each disc base and distraction turned against those disc spaces at the time each disc was attended to.      Operating Microscope: The operating microscope was draped using sterile technique and brought into the field and the rest of the operation was performed under operative magnification with microdissection technique and micro-illumination with the aid of the operating microscope.     1st level Cervical Discectomy/Decompression:  A #11 blade was used to sharply incise the disc spaces and a pituitary ronguer was used to remove the discs at C5C6. A 6 mm barrel-style drill bit was used with a [employer] pneumatic drill to perform a Reardon-Sullivan resection of the disc and portions of the end plate to prepare and decompress the disc space. The microscope was then used to perform microdissection along bilateral neural foramen.  Spondylitic stenosis and disc material was found along the central canal and bilateral neural foramen at this levels but stenosis was quite severe centrally as well as equally severe in both neural foramen.  Upon completion of the decompression the nerve hook passed freely along both neural foramen and the central canal could be seen with patency of the dura and the cord was seen to be pulsatile through the translucent dura. The wound was copiously irrigated and FloSeal was used for hemostasis.     1st level cervical anterior instrumentation and arthrodesis:  A 7 mm XeroP Depuy Spine interbody device was fashioned to fit the anterior spine across C5C6 and distraction was released across the disc space.  The  interbody fixation device was then fixed into position with an 18 mm screw placed on the right extending into the C5 vertebral body and 16 mm screw placed into the device affixing the C6 vertebral body.  The locking device was visualized to be engaged across both screws.  Intraoperative fluoroscopy documented placement of the interbody device.  Hemostasis was adequate.       2nd level Cervical Discectomy/Decompression:  A #11 blade was used to sharply incise the disc spaces and a pituitary ronguer was used to remove the disc at C6C7. A 6 mm barrel-style drill bit was used with a [employer] pneumatic drill to perform a Reardon-Sullivan resection of the disc and portions of the end plate to prepare and decompress the disc space. The microscope was then used to perform microdissection along bilateral neural foramen.  Spondylitic stenosis and disc material was found along the central canal and bilateral neural foramen at this levels but there was a large disc osteophyte complex slightly right of midline compressing the cord with less severe neuroforaminal stenosis that was seen at the C5-C6 level.  The foraminotomy and central decompression technique successfully decompress the central canal with the spinal cord seen pulsatile beneath the translucent dura and the nerve hook passed freely along both C7 neural foramen.. The wound was copiously irrigated and FloSeal was used for hemostasis.     2nd level cervical anterior instrumentation and arthrodesis:  A 7 mm XeroP Depuy Spine interbody device was fashioned to fit the anterior spine across C6C7 and distraction was released across the disc space.  The interbody fixation device was then fixed into position with 16 mm screws.  The locking device was visualized to be engaged across both screws.  Intraoperative fluoroscopy documented placement of the interbody device.  Hemostasis was adequate.      3rd level Cervical Discectomy/Decompression:  A #11 blade was used to  sharply incise the disc spaces and a pituitary ronguer was used to remove the discs at C4C5. A 6 mm barrel-style drill bit was used with a [employer] Elias pneumatic drill to perform a Reardon-Sullivan resection of the disc and portions of the end plate to prepare and decompress the disc space. The microscope was then used to perform microdissection along bilateral neural foramen.  Spondylitic stenosis and disc material was found along the central canal and bilateral neural foramen at this levels but there is quite severe to the left at C4-C5 with the patient was most symptomatic with C5 radiculopathy.  Upon completion of the procedure the nerve hook passed freely along both C5 neuroforamen and the central canal was decompressed with the spinal cord seen pulsatile through the translucent dura. The wound was copiously irrigated and FloSeal was used for hemostasis.     3rd level cervical anterior instrumentation and arthrodesis:  A 7 mm XeroP Depuy Spine interbody device was fashioned to fit the anterior spine across C4C5 and distraction was released across the disc space.  The interbody fixation device was then fixed into position with 16 mm screws.  The locking device was visualized to be engaged across both screws.  Intraoperative fluoroscopy documented placement of the interbody device.  Hemostasis was adequate.       Closure: The deep platysma was reapproximated with the 2-O Vicryl suture and the superficial skin was closed with 3-O Vicryl suture. Steri-strips were applied and the needle and sponge counts were correct at the end of the case.  The Peters catheter was removed at the end of the procedure.  The patient was transported to postop recovery in stable condition.    Alexis Prado MD     Date: 11/4/2024  Time: 11:04 EST

## 2024-11-04 NOTE — ANESTHESIA PREPROCEDURE EVALUATION
Anesthesia Evaluation     Patient summary reviewed and Nursing notes reviewed   NPO Solid Status: > 8 hours             Airway   Mallampati: II  TM distance: >3 FB  Neck ROM: full  no difficulty expected  Dental - normal exam     Pulmonary - normal exam   Cardiovascular - normal exam    (+) hypertension, CAD, cardiac stents , dysrhythmias Paroxysmal Atrial Fib, hyperlipidemia      Neuro/Psych  GI/Hepatic/Renal/Endo      Musculoskeletal     Abdominal  - normal exam   Substance History      OB/GYN          Other   arthritis,                 Anesthesia Plan    ASA 3     general     intravenous induction     Anesthetic plan, risks, benefits, and alternatives have been provided, discussed and informed consent has been obtained with: patient.    CODE STATUS:

## 2024-11-05 VITALS
BODY MASS INDEX: 25.6 KG/M2 | TEMPERATURE: 98.6 F | WEIGHT: 189 LBS | DIASTOLIC BLOOD PRESSURE: 76 MMHG | RESPIRATION RATE: 16 BRPM | SYSTOLIC BLOOD PRESSURE: 124 MMHG | HEIGHT: 72 IN | OXYGEN SATURATION: 100 % | HEART RATE: 68 BPM

## 2024-11-05 PROCEDURE — 25010000002 SODIUM CHLORIDE 0.9 % WITH KCL 20 MEQ 20-0.9 MEQ/L-% SOLUTION: Performed by: NEUROLOGICAL SURGERY

## 2024-11-05 RX ORDER — CYCLOBENZAPRINE HCL 10 MG
10 TABLET ORAL 3 TIMES DAILY PRN
Qty: 60 TABLET | Refills: 3 | Status: SHIPPED | OUTPATIENT
Start: 2024-11-05

## 2024-11-05 RX ORDER — HYDROCODONE BITARTRATE AND ACETAMINOPHEN 5; 325 MG/1; MG/1
1 TABLET ORAL EVERY 4 HOURS PRN
Qty: 20 TABLET | Refills: 0 | Status: SHIPPED | OUTPATIENT
Start: 2024-11-05 | End: 2024-11-09

## 2024-11-05 RX ADMIN — METOPROLOL TARTRATE 50 MG: 50 TABLET, FILM COATED ORAL at 10:14

## 2024-11-05 RX ADMIN — ACETAMINOPHEN 325MG 650 MG: 325 TABLET ORAL at 06:16

## 2024-11-05 RX ADMIN — CYCLOBENZAPRINE 10 MG: 10 TABLET, FILM COATED ORAL at 03:06

## 2024-11-05 RX ADMIN — HYDROCODONE BITARTRATE AND ACETAMINOPHEN 1 TABLET: 5; 325 TABLET ORAL at 10:14

## 2024-11-05 RX ADMIN — POTASSIUM CHLORIDE AND SODIUM CHLORIDE 75 ML/HR: 900; 150 INJECTION, SOLUTION INTRAVENOUS at 03:04

## 2024-11-05 RX ADMIN — HYDROCODONE BITARTRATE AND ACETAMINOPHEN 1 TABLET: 5; 325 TABLET ORAL at 03:04

## 2024-11-05 RX ADMIN — Medication 3 ML: at 10:14

## 2024-11-05 NOTE — PLAN OF CARE
Goal Outcome Evaluation:  Plan of Care Reviewed With: patient        Progress: improving  Outcome Evaluation: POD#1 OF ACDF . Dressing to neck intact. VSS. PO pain medication helping with pain. Ambulating fine. Patient refused bed alarm. Voiding fine per brp. Soft collar at bedside. Education provided on safety and pain control. Patient verbalized understanding[facility]

## 2024-11-05 NOTE — CASE MANAGEMENT/SOCIAL WORK
Case Management Discharge Note      Final Note: dc home         Selected Continued Care - Discharged on 11/5/2024 Admission date: 11/4/2024 - Discharge disposition: Home or Self Care      Destination    No services have been selected for the patient.                Durable Medical Equipment    No services have been selected for the patient.                Dialysis/Infusion    No services have been selected for the patient.                Home Medical Care    No services have been selected for the patient.                Therapy    No services have been selected for the patient.                Community Resources    No services have been selected for the patient.                Community & DME    No services have been selected for the patient.                    Transportation Services  Private: Car    Final Discharge Disposition Code: 01 - home or self-care

## 2024-11-05 NOTE — PLAN OF CARE
Goal Outcome Evaluation:POD1 ACDF with allograft. Incision C/D/I with steri strips,VSS, afebrile, pain controlled with po pain medication, IV DC'd, DC instructions reviewed with pt, all questions answered, M2B received, Pt taken via WC with all belongings to DC exit.

## 2024-11-05 NOTE — DISCHARGE SUMMARY
NEUROSURGERY DISCHARGE SUMMARY     Abram Allen  1965  [id number]    Date of Discharge:  11/5/2024    Discharge Diagnosis:   Cervical stenosis of spine    Left arm weakness    Cervical spondylosis without myelopathy    Neuroforaminal stenosis of cervical spine      Presenting Problem/History of Present Illness  Active Hospital Problems    Diagnosis  POA    **Cervical stenosis of spine [M48.02]  Yes    Cervical spondylosis without myelopathy [M47.812]  Yes    Neuroforaminal stenosis of cervical spine [M48.02]  Yes    Left arm weakness [R29.898]  Yes      Resolved Hospital Problems   No resolved problems to display.        Hospital Course  Patient is a 59 y.o. male presented with left arm weakness with clinical and electrodiagnostic evidence of left arm radiculopathy secondary to multilevel neuroforaminal stenosis and spondylitic cord compression from C4-C7.  CT operative report for details dated 11/4/2024.  Postoperatively the patient had the typical postoperative neck pain which was well-controlled with oral medications.  He had some discomfort swallowing but was able to tolerate his a.m. meal without difficulty.  He is voiding both urine and bowel without difficulty.  He does not notice improvement in his left arm weakness yet but is encouraged by the initial postoperative results.  He has no left arm pain.  He denies any new deficits.      Procedures Performed    Procedure(s):  Anterior cervical discectomy and fusion at cervical 4 cervical 5, cervical 5 cervical 6, cervical 6 cervical 7 with allograft (cadaver) bone fusion with 0 profile interbody mechanical device at each level  -------------------       Consults:   Consults       No orders found for last 30 day(s).            Pertinent Test Results: radiology: X-Ray: Intraoperative fluoroscopy documents position of the C4-C5 and C5-C6 instrumentation C6-C7 instrumentation was not well-visualized due to the patient's prominent shoulders    Condition on  Discharge: Stable    Vital Signs  Temp:  [96.8 °F (36 °C)-98.6 °F (37 °C)] 98.6 °F (37 °C)  Heart Rate:  [] 68  Resp:  [14-20] 16  BP: ()/(57-78) 124/76  Body mass index is 25.63 kg/m².    Physical Exam:     Alert and oriented x 3 sitting up eating breakfast  Cervical incision intact with Steri-Strips there is no underlying hematoma and the trachea is in the midline  Patient has left arm weakness with 4-/5 strength in the left bicep and deltoid consistent with preop in the left deltoid has difficulty elevating above horizontal suggesting it may be 3/5 strength also consistent with preop.  He still has atrophy of the left deltoid.  Sensory exam is intact to fine touch in both upper extremities  Voice is clear and not hoarse  Pupils are equal and reactive  No lower extremity edema    Discharge Disposition  Home or Self Care    Discharge Medications     Discharge Medications        New Medications        Instructions Start Date   cyclobenzaprine 10 MG tablet  Commonly known as: FLEXERIL   10 mg, Oral, 3 Times Daily PRN      HYDROcodone-acetaminophen 5-325 MG per tablet  Commonly known as: NORCO   1 tablet, Oral, Every 4 Hours PRN             Changes to Medications        Instructions Start Date   apixaban 5 MG tablet tablet  Commonly known as: Eliquis  What changed: These instructions start on November 7, 2024. If you are unsure what to do until then, ask your doctor or other care provider.   5 mg, Oral, Every 12 Hours Scheduled   Start Date: November 7, 2024            Continue These Medications        Instructions Start Date   metoprolol tartrate 50 MG tablet  Commonly known as: LOPRESSOR   50 mg, Oral, 2 Times Daily      rosuvastatin 40 MG tablet  Commonly known as: CRESTOR   40 mg, Oral, Nightly               Discharge Diet:  Regular    Activity at Discharge: See detailed discharge instructions attached, note that I advised patient to resume his Eliquis on Thursday November 7, 2024    Follow-up  Appointments  Future Appointments   Date Time Provider Department Center   11/22/2024  8:45 AM Alexis Prado MD [facility]   12/6/2024 11:40 AM Paula Hall MD [facility]         Call for: Patient instructed to call for fever >101.5, chills, wound swelling/drainage/redness, change in neurologic status including but not limited to pain unresponsive to medical management, new or progressive deficits in the upper or lower extremities    Test Results Pending at Discharge       Alexis Prado MD  11/05/24  08:05 EST

## 2024-11-05 NOTE — DISCHARGE INSTRUCTIONS
[facility]  [address], [address]  [place] [address]  Phone:  [phone]  Fax:  [phone]    Dr. Alexis Prado MD FACS [facility]            Cervical Discectomy Post-Operative Instructions    ** Resume Eliquis on Thursday, November 7, 2024    It will be normal to have a sore throat and have some difficulty swallowing food for a couple of weeks following her surgery.  See your surgeon if this seems to be getting worse rather than better.  A few days of hoarseness is also typical.    You may resume your usual diet as you tolerate    No lifting overhead, although you may place your arms above your head.  DO NOT lift anything over five (5) pounds.  Walking is allowed and encouraged. There are no restrictions on sitting or climbing stairs, but refrain from overly strenuous activity.  You may notice that a constant position (standing or sitting) greater than 45 minutes may tend to make you more sore and therefore it is recommended that you change positions frequently. Do not drive until you are seen back for your first postoperative visit, although you may be a passenger in a car.      If you were given a cervical collar, it was given to you for comfort and if it does not help you do not need to wear it    Refrain from any sexual activity until after your first evaluation at the office.     Remove your dressing the day after your surgery and leave it off.  You may shower and pat the incision dry, but do not soak your wound in water such as a swimming pool, hot tub or lake.   Avoid direct sunlight to the wound for at least three (3) months.  You may apply ice to the surgical site for 15 to 20 minutes each hour while awake for the first few days following surgery.  Simply put the ice in a plastic bag in place a towel between the bag of ice and your skin.    You have Steri-Strips applied to the skin edges of your incision.  They should fall off in 7 to 10 days, however, if they do not fall off by  the 10th day you may remove them.    You will leave the hospital with prescriptions for pain medicine and a muscle relaxer.  These medications must be taken as prescribed only.  If a refill of your pain medication is required, in order to have this medication refilled, you must contact the office 3 days (72 hrs) prior to running out, but the amount prescribed is generally enough to last until the first post-operative visit.      A small amount of bleeding from the incision during the first few days is not unusual.       You should have a post-operative visit approximately 2 weeks after surgery.  If you do not have a scheduled appointment, call the office at [phone] to schedule one.  We will discuss return to work at your first post-operative visit, but you can expect to be off of work for an average of 6 weeks.     Notify the office if there are any problems, such as:    Excessive neck or arm pain  Persistent temperature of 101.5° F (38.6 ° C) or greater that is not relieved by Tylenol.  Excessive bleeding, redness, heat, swelling or pus around the incision   If you cannot swallow, have difficulty breathing, have chest pain or shortness of breath, call [phone].   Your pain is not controlled with medication  You seem to be getting worse rather than better    Thank you.

## 2024-11-06 NOTE — PROGRESS NOTES
"Subjective   History of Present Illness: Abram Allen is a 59 y.o. male is here today for 2 week post op after undergoing surgery on 11/04.  He is doing extraordinarily well following surgery and only required medications for pain for about 5 days after the surgery.  He states that he has no neck pain and he swallowing well.  He still has weakness in the left arm but he really had a chronicity of weakness that he has some muscular atrophy that may be difficult to completely recover.  He is encouraged to start physical therapy.    History of Present Illness    Tobacco Use: Low Risk  (11/22/2024)    Patient History     Smoking Tobacco Use: Never     Smokeless Tobacco Use: Never     Passive Exposure: Not on file        The following portions of the patient's history were reviewed and updated as appropriate: allergies, current medications, past family history, past medical history, past social history, past surgical history and problem list.    Review of Systems   Constitutional:  Negative for fever.   Musculoskeletal:  Negative for neck pain and neck stiffness.   Neurological:  Positive for weakness (lefft arm). Negative for dizziness, light-headedness, numbness and headaches.   All other systems reviewed and are negative.      Objective     Vitals:    11/22/24 0838   BP: 118/62   BP Location: Left arm   Patient Position: Sitting   Cuff Size: Adult   Resp: 20   Weight: 85.7 kg (189 lb)   Height: 182.9 cm (72.01\")   PainSc: 0-No pain     Body mass index is 25.63 kg/m².      Physical Exam  Neurological Exam    Physical Exam:    CONSTITUTIONAL:  appears well developed, well-nourished and in no acute distress.    NECK: the neck is supple and symmetric. The trachea is midline with no masses.      PULMONARY: Respiratory effort is normal with no increased work of breathing or signs of respiratory distress.    CARDIOVASCULAR: Pedal pulses are +2/4 bilaterally. Examination of the extremities shows no edema or " varicosities.    MUSCULOSKELETAL: Gait normal    SKIN: The skin is warm, dry and intact.  Anterior neck incision healing well without erythema, induration or drainage    NEUROLOGIC:   Motor strength roughly 3/5 in the left deltoid may be a little bit of resistance strength, 4/5 strength in the left bicep. Muscle bulk and tone reveal atrophy of the left bicep and deltoid  Sensory exam is normal to fine touch  Cortical function is intact and without deficits. Speech is normal.    PSYCHIATRIC: oriented to person, place and time. Patient's mood and affect are normal.    Assessment & Plan     Medical Decision Making:      The patient can now lift up to 25 lbs. and may soak the incision in a bath or pool if desired. They will arrange Physical Therapy 2-3 times per week for 3 weeks to initiate a home exercise plan. It is safe for the patient to drive if they are comfortable driving and not consuming narcotic pain medications. Follow up is arranged following PT and cervical x-rays. All questions were reviewed and answered.    Return in about 4 weeks (around 12/20/2024) for discussion of Physical Therapy results, review of completed images.    Diagnoses and all orders for this visit:    1. S/P neck surgery, follow-up exam (Primary)  -     Ambulatory Referral to Physical Therapy for Evaluation & Treatment  -     XR Spine Cervical Complete 4 or 5 View; Future    2. Cervical spondylosis without myelopathy  -     Ambulatory Referral to Physical Therapy for Evaluation & Treatment  -     XR Spine Cervical Complete 4 or 5 View; Future             Alexis Prado MD FACS [facility]  Neurological Surgery

## 2024-11-22 ENCOUNTER — OFFICE VISIT (OUTPATIENT)
Dept: NEUROSURGERY | Facility: CLINIC | Age: 59
End: 2024-11-22
Payer: COMMERCIAL

## 2024-11-22 VITALS
WEIGHT: 189 LBS | RESPIRATION RATE: 20 BRPM | DIASTOLIC BLOOD PRESSURE: 62 MMHG | SYSTOLIC BLOOD PRESSURE: 118 MMHG | BODY MASS INDEX: 25.6 KG/M2 | HEIGHT: 72 IN

## 2024-11-22 DIAGNOSIS — M47.812 CERVICAL SPONDYLOSIS WITHOUT MYELOPATHY: ICD-10-CM

## 2024-11-22 DIAGNOSIS — Z09 S/P NECK SURGERY, FOLLOW-UP EXAM: Primary | ICD-10-CM

## 2024-11-22 PROCEDURE — 99024 POSTOP FOLLOW-UP VISIT: CPT | Performed by: NEUROLOGICAL SURGERY

## 2024-11-26 ENCOUNTER — HOSPITAL ENCOUNTER (OUTPATIENT)
Dept: PHYSICAL THERAPY | Facility: HOSPITAL | Age: 59
Setting detail: THERAPIES SERIES
Discharge: HOME | End: 2024-11-26
Payer: COMMERCIAL

## 2024-11-26 DIAGNOSIS — Z09 S/P NECK SURGERY, FOLLOW-UP EXAM: Primary | ICD-10-CM

## 2024-11-26 DIAGNOSIS — M47.812 CERVICAL SPONDYLOSIS WITHOUT MYELOPATHY: ICD-10-CM

## 2024-11-26 NOTE — THERAPY EVALUATION
Outpatient Physical Therapy Ortho Initial Evaluation   [facility]     Patient Name: Abram Allen  : 1965  MRN: [id number]  Today's Date: 2024      Visit Date: 2024    Patient Active Problem List   Diagnosis    Hyperlipidemia    Coronary artery disease involving native coronary artery of native heart without angina pectoris    S/P coronary artery stent placement    LBBB (left bundle branch block)    Strain of lumbar region    Cutaneous CD30+ lymphoproliferative disorder    Family hx-malignancy    Lung nodule    Paroxysmal atrial fibrillation    Abnormal nuclear stress test    Personal history of colonic polyps    Primary osteoarthritis of right knee    Left arm weakness    Cervical spondylosis without myelopathy    Neuroforaminal stenosis of cervical spine    Cervical stenosis of spine    S/P neck surgery, follow-up exam        Past Medical History:   Diagnosis Date    Arthritis     OA of knees    Cervical spondylosis without myelopathy 2024    LEFT ARM WEAKNESS    Coronary artery disease     STENT X 1    COVID-19 2020    Current use of long term anticoagulation     A-FIB:  ELIQUIS    Dislocation, shoulder 5x-Bankart Repair    Hyperlipidemia     Hypertension     Knee swelling 18-24 months    Left bundle branch block     Paroxysmal A-fib     ELIQUIS    Strain of lumbar region 2018        Past Surgical History:   Procedure Laterality Date    ADENOIDECTOMY      ANTERIOR CERVICAL DISCECTOMY W/ FUSION Bilateral 2024    Procedure: Anterior cervical discectomy and fusion at cervical 4 cervical 5, cervical 5 cervical 6, cervical 6 cervical 7 with allograft (cadaver) bone fusion with 0 profile interbody mechanical device at each level;  Surgeon: Alexis Prado MD;  Location: [facility];  Service: Neurosurgery;  Laterality: Bilateral;    CARDIAC CATHETERIZATION N/A 08/10/2017    by Eneida Black MD; Left main normal, LAD scattered 10% proximal stenosis, mid LAD 95%  concentric stenosis near focal area of calcification, left circumflex normal, first obtuse marginal branch proximal 10% stenosis, and right coronary artery with a 10% proximal and a 30% mid stenosis    CARDIAC CATHETERIZATION N/A 08/10/2017    by Eneida Black MD; Xience Alpine drug-eluting stent placement 3.5×18 mm to the mid LAD      CARDIAC CATHETERIZATION N/A 01/15/2021    Procedure: Coronary angiography;  Surgeon: Emile Krause MD;  Location:  [facility];  Service: Cardiovascular;  Laterality: N/A;    CARDIAC CATHETERIZATION N/A 01/15/2021    Procedure: Left Heart Cath;  Surgeon: Emile Krause MD;  Location:  [facility];  Service: Cardiovascular;  Laterality: N/A;    CARDIAC CATHETERIZATION N/A 01/15/2021    Procedure: Left ventriculography;  Surgeon: Emile Krause MD;  Location:  [facility];  Service: Cardiovascular;  Laterality: N/A;    COLONOSCOPY W/ POLYPECTOMY N/A 05/23/2022    Procedure: COLONOSCOPY WITH POLYPECTOMY;  Surgeon: Todd Craig MD;  [facility];  Service: Gastroenterology;  Laterality: N/A;  Sigmoid polyp x 1    CORONARY ANGIOPLASTY WITH STENT PLACEMENT      LAD    SHOULDER SURGERY Left     SCOPE    TONSILLECTOMY         Visit Dx:     ICD-10-CM ICD-9-CM   1. S/P neck surgery, follow-up exam  Z09 V67.09   2. Cervical spondylosis without myelopathy  M47.812 721.0          Patient History       Row Name 11/26/24 1030             History    Chief Complaint Difficulty with daily activities;Muscle weakness  -SP      Date Current Problem(s) Began 11/04/24  -SP      Brief Description of Current Complaint Pt reports left UE weakness since 2011. He was able to adapt his daily activities to accomodate the weakness, but did notice some increase in the weakness over the last year. A recent MRI showed significant stenosis at C4/5, C5/6, and C6/7. Pt underwent a 3 level ACDF on 11/4. He was hospitalized  overnight and discharged home 11/5. He wore a soft cervical collar for approximately one week, primarily when sleeping. He was initially limited to no lifting greater than 5# but this has now been increased to 25#. He is scheduled to return to work 12/2.  -SP      Patient/Caregiver Goals Improve strength;Improve mobility  -SP      Patient's Rating of General Health Good  -SP      Hand Dominance right-handed  -SP      Occupation/sports/leisure activities physical therapist  -SP      What clinical tests have you had for this problem? MRI  -SP      Results of Clinical Tests stenosis  -SP         Pain     Pain Location Neck  -SP      Pain at Present 0  no pain at rest  -SP      Pain at Best 0  -SP      Pain at Worst 2  -SP      Pain Frequency Intermittent  -SP      Pain Description Aching;Discomfort;Sore;Tightness  -SP      What Performance Factors Make the Current Problem(s) WORSE? pt c/o pain with movement of his neck  -SP      What Performance Factors Make the Current Problem(s) BETTER? no pain at rest  -SP      Is your sleep disturbed? Yes  -SP      Total hours of sleep per night 5-6 hours  -SP      Difficulties at work? Pt is currentlay off work due to this procedure  -SP      Difficulties with ADL's? reaching behind self; driving  -SP         Daily Activities    Primary Language English  -SP      Are you able to read Yes  -SP      Are you able to write Yes  -SP      How does patient learn best? Reading  -SP      Teaching needs identified Home Exercise Program;Management of Condition  -SP      Patient is concerned about/has problems with Grasping objects lifting;Performing job responsibilities/community activities (work, school,;Performing sports, recreation, and play activities;Reaching over head;Repetitive movements of the hand, arm, shoulder  -SP      Does patient have problems with the following? None  -SP      Barriers to learning None  -SP      Pt Participated in POC and Goals Yes  -SP         Safety    Are  you being hurt, hit, or frightened by anyone at home or in your life? No  -SP      Are you being neglected by a caregiver No  -SP                User Key  (r) = Recorded By, (t) = Taken By, (c) = Cosigned By      Initials Name Provider Type    Verona Downs, PT Physical Therapist                     PT Ortho       Row Name 11/26/24 1030       Precautions and Contraindications    Precautions/Limitations spinal precautions  25# lifting restriction  -SP       Subjective Pain    Pre-Treatment Pain Level 0  -SP       Posture/Observations    Forward Head Mild  -SP    Cervical Lordosis Decreased  -SP    Rounded Shoulders Bilateral:;Mild  -SP    Observations Incision healing;Muscle atrophy  -SP    Posture/Observations Comments Anterior cervical incision well healed with no signs or symptoms of infection.  Significant periscapula, deltoid and bicep muscle atrophy present left UE  -SP       DTR- Upper Quarter Clearing    Biceps (C5/6) Right:;2- Normal response;Left:;1- Minimal response  -SP    Brachioradialis (C6) Bilateral:;1- Minimal response  -SP    Triceps (C7) Bilateral:;1- Minimal response  -SP       Sensory Screen for Light Touch- Upper Quarter Clearing    C4 (posterior shoulder) Bilateral:;Intact  -SP    C5 (lateral upper arm) Bilateral:;Intact  -SP    C6 (tip of thumb) Bilateral:;Intact  -SP    C7 (tip of 3rd finger) Bilateral:;Intact  -SP    C8 (tip of 5th finger) Bilateral:;Intact  -SP    T1 (medial lower arm) Bilateral:;Intact  -SP       Cervical Palpation    Upper Traps Bilateral:;Guarded/taut  -SP       General ROM    GENERAL ROM COMMENTS right upper extremity AROM is WFL  -SP       Head/Neck/Trunk    Neck Extension AROM 9 degrees  -SP    Neck Flexion AROM 15 degrees  -SP    Neck Lt Lateral Flexion AROM 10 degrees  -SP    Neck Rt Lateral Flexion AROM 9 degrees  -SP    Neck Lt Rotation AROM 29 degrees  -SP    Neck Rt Rotation AROM 34 degrees  -SP       Left Upper Ext    Lt Shoulder Abduction AROM 45  degrees  -SP    Lt Shoulder Abduction PROM 130  degrees  -SP    Lt Shoulder Flexion AROM 60 degrees  -SP    Lt Shoulder Flexion PROM 116 degrees  -SP    Lt Shoulder External Rotation PROM 37 degrees  -SP    Lt Shoulder Internal Rotation AROM able to reach to sacral base  -SP    Lt Shoulder Internal Rotation PROM 58 degrees  -SP    Lt Elbow Extension/Flexion AROM WFL  -SP       MMT (Manual Muscle Testing)    General MMT Comments right UE strength 5/5 throughout  -SP       MMT Left Upper Ext    Lt Shoulder Flexion MMT, Gross Movement (2/5) poor  -SP    Lt Shoulder Extension MMT, Gross Movement (4/5) good  -SP    Lt Shoulder ABduction MMT, Gross Movement (2/5) poor  -SP    Lt Shoulder Internal Rotation MMT, Gross Movement (4-/5) good minus  -SP    Lt Shoulder External Rotation MMT, Gross Movement (3-/5) fair minus  -SP    Lt Elbow Flexion MMT, Gross Movement (3+/5) fair plus  -SP    Lt Elbow Extension MMT, Gross Movement (4-/5) good minus  -SP    Lt Forearm Supination MMT, Gross Movement (3/5) fair  -SP    Lt Forearm Pronation MMT, Gross Movement (4-/5) good minus  -SP    Lt Wrist Flexion MMT, Gross Movement (4/5) good  -SP    Lt Wrist Extension MMT, Gross Movement (4/5) good  -SP        Strength Right    # Reps 3  -SP    Right Rung 2  -SP    Right  Test 1 81  -SP    Right  Test 2 79  -SP    Right  Test 3 78  -SP     Strength Average Right 79.33  -SP        Strength Left    # Reps 3  -SP    Left Rung 2  -SP    Left  Test 1 71  -SP    Left  Test 2 70  -SP    Left  Test 3 71  -SP     Strength Average Left 70.67  -SP       Sensation    Sensation WNL? WFL  -SP       Upper Extremity Flexibility    Suboccipitals Bilateral:;Moderately limited  -SP    Upper Trapezius Bilateral:;Moderately limited  -SP    Pect Minor Bilateral:;Moderately limited  -SP       Transfers    Bed-Chair [place] (Transfers) independent  -SP    Chair-Bed [place] (Transfers) independent  -SP    Sit-Stand  [place] (Transfers) independent  -SP    Stand-Sit [place] (Transfers) independent  -SP    Comment, (Transfers) Patient able to transfers supine/sit using appropriate technique  -SP       Gait/Stairs (Locomotion)    [place] Level (Gait) independent  -SP              User Key  (r) = Recorded By, (t) = Taken By, (c) = Cosigned By      Initials Name Provider Type    Verona Downs, PT Physical Therapist                                Therapy Education  Education Details: Reviewed HEP for chin tucks, scapula retraction, light UE strengthening. Patient is walking regularly.  Reviewed appropriate body mechanics  Given: Posture/body mechanics, HEP  Program: Reinforced  How Provided: Verbal  Provided to: Patient  Level of Understanding: Verbalized, Demonstrated      PT OP Goals       Row Name 11/26/24 1400          PT Short Term Goals    STG Date to Achieve 12/11/24  -SP     STG 1 Patient verbalizes and demonstrates compliance with post-surgical spinal precaution including lifting limits (25# per physician)  -SP     STG 2 Patient demonstrates cervical spine AROM rotation to >40 degrees bilaterally to aid in driving and safe mobility  -SP     STG 3 Patient reports that he is able to sleep through the night >6 hours, without walking due to cervical area discomfort  -SP        Long Term Goals    LTG Date to Achieve 12/26/24  -SP     LTG 1 Patient demonstrates left UE flexion and scaption to >90 degrees  -SP     LTG 2 Patient demonstrates cervical spine AROM increased by 5 degrees into all planes without complaints of pain at end ranges  -SP     LTG 3 Patient tolerates return to work and community ADLs with pain level 0-1/10  -SP     LTG 4 Patient independent with HEP  -SP        Time Calculation    PT Goal Re-Cert Due Date 12/26/24  -SP               User Key  (r) = Recorded By, (t) = Taken By, (c) = Cosigned By      Initials Name Provider Type    Verona Downs, PT Physical Therapist                      PT Assessment/Plan       Row Name 11/26/24 1502 11/26/24 1501       PT Assessment    Functional Limitations -- Limitation in home management;Limitations in community activities;Performance in work activities;Performance in sport activities;Performance in self-care ADL;Performance in leisure activities;Limitations in functional capacity and performance  -SP    Assessment Comments Patient with left UE weakness since 2011.  Imaging showed significant strenosis C4/5, C5/6, and C6/7.  Patient is s/p 3 levele ACDF on 11/4/24.  Patient presents with mild pain, decreased cervical and left UE ROM, decreased postural muscle and left UE strength, and difficulty performing home, work and community ADLs.  -SP --    Please refer to paper survey for additional self-reported information Yes  -SP --    Rehab Potential Good  -SP --    Patient/caregiver participated in establishment of treatment plan and goals Yes  -SP --    Patient would benefit from skilled therapy intervention Yes  -SP --       PT Plan    PT Frequency 1x/week;2x/week  -SP --    Predicted Duration of Therapy Intervention (PT) 6-8 weeks  -SP --    Planned CPT's? PT EVAL LOW COMPLEXITY: 20087;PT MANUAL THERAPY EA 15 MIN: 83029;PT THER PROC EA 15 MIN: 48782;PT HOT OR COLD PACK TREAT MCARE;PT ELECTRICAL STIM UNATTEND:   -SP --              User Key  (r) = Recorded By, (t) = Taken By, (c) = Cosigned By      Initials Name Provider Type    SP Verona Kang, PT Physical Therapist                       OP Exercises       Row Name 11/26/24 1030             Subjective Pain    Pre-Treatment Pain Level 0  -SP                User Key  (r) = Recorded By, (t) = Taken By, (c) = Cosigned By      Initials Name Provider Type    Verona Downs, PT Physical Therapist                  Manual Rx (Last 36 Hours)       Manual Treatments       Row Name 11/26/24 1300             Manual Rx 1    Manual Rx 1 Location left shoulder  -SP      Manual  Rx 1 Type PROM into all planes 10 x each  -SP                User Key  (r) = Recorded By, (t) = Taken By, (c) = Cosigned By      Initials Name Provider Type    Verona Downs, PT Physical Therapist                                Outcome Measure Options: Neck Disability Index (NDI)  Neck Disability Index  Section 1 - Pain Intensity: I have no pain at the moment.  Section 2 - Personal Care: I can look after myself normally without causing extra pain.  Section 3 - Lifting: I can lift very light weights.  Section 4 - Reading: I can read as much as I want with no neck pain.  Section 5 - Headaches: I have no headaches at all.  Section 6 - Concentration: I can concentrate fully when I want to without difficulty.  Section 7 - Work: I cannot do my usual work.  Section 8 - Driving: I can drive as long as I want with slight neck pain.  Section 9 - Sleeping: My sleep is mildly disturbed (1-2 hours sleepless).  Section 10 - Recreation: I am able to engage in all recreational activities with some pain in my neck.  Neck Disability Index Score: 11      Time Calculation:     Start Time: 1030  Stop Time: 1130  Time Calculation (min): 60 min         PT G-Codes  Outcome Measure Options: Neck Disability Index (NDI)  Neck Disability Index Score: 11         Verona Kang, GEO  11/26/2024

## 2024-12-06 ENCOUNTER — OFFICE VISIT (OUTPATIENT)
Dept: CARDIOLOGY | Facility: CLINIC | Age: 59
End: 2024-12-06
Payer: COMMERCIAL

## 2024-12-06 VITALS
SYSTOLIC BLOOD PRESSURE: 114 MMHG | HEIGHT: 72 IN | WEIGHT: 182 LBS | BODY MASS INDEX: 24.65 KG/M2 | HEART RATE: 49 BPM | DIASTOLIC BLOOD PRESSURE: 60 MMHG

## 2024-12-06 DIAGNOSIS — Z95.5 S/P CORONARY ARTERY STENT PLACEMENT: ICD-10-CM

## 2024-12-06 DIAGNOSIS — I44.7 LBBB (LEFT BUNDLE BRANCH BLOCK): ICD-10-CM

## 2024-12-06 DIAGNOSIS — I48.0 PAROXYSMAL ATRIAL FIBRILLATION: ICD-10-CM

## 2024-12-06 DIAGNOSIS — E78.2 MIXED HYPERLIPIDEMIA: ICD-10-CM

## 2024-12-06 DIAGNOSIS — I25.10 CORONARY ARTERY DISEASE INVOLVING NATIVE CORONARY ARTERY OF NATIVE HEART WITHOUT ANGINA PECTORIS: Primary | ICD-10-CM

## 2024-12-06 PROCEDURE — 93000 ELECTROCARDIOGRAM COMPLETE: CPT | Performed by: INTERNAL MEDICINE

## 2024-12-06 PROCEDURE — 99214 OFFICE O/P EST MOD 30 MIN: CPT | Performed by: INTERNAL MEDICINE

## 2024-12-06 NOTE — PROGRESS NOTES
Date of Office Visit: 2024  Encounter Provider: Paula Hall MD  Place of Service: [facility] CARDIOLOGY  Patient Name: Abram Allen  :1965      Patient ID:  Abram Allen is a 59 y.o. male is here for  followup for CAD and PAF.        History of Present Illness    He has a history of hypertension, hyperlipidemia, left bundle branch block, PAF, CAD-status post LAD stent 2017, PAF.      He came in for an evaluation 2017.  He was having chest heaviness with running.  He has been a lifelong runner. He had a treadmill study done because of the chest pain.  This was done 8/3/2017 showed left bundle branch block with repolarization changes during exercise.  Because of his complaints and the ECG changes, I recommended that he undergo heart catheterization.  This was done 8/10/2017.  This showed normal left main, 10% proximal LAD, 95% mid LAD, normal left circumflex, 10% first obtuse marginal stenosis, 10% proximal RCA stenosis and 30% mid vessel stenosis.  He received a Xience Alpine drug-eluting stent, 3.5 x 18 mm the mid left anterior descending artery.  He had a treadmill stress study which was done 2017.  This showed normal heart rate response and blood pressure response exercise and good exercise tolerance.  He completed cardiac rehabilitation.     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     He was in the emergency department due to palpitations and chest pain on 2020.  He was in atrial fibrillation with rapid ventricular response-given IV diltiazem.  He was started on metoprolol 25 mg twice daily and a Holter was placed.  Monitor done 2020 was abnormal showing atrial fibrillation throughout the monitor with an average heart rate of 90.    He was on apixaban-he spontaneously converted to normal sinus rhythm.  He started having exertional dyspnea and mild chest pain.  He has stress nuclear perfusion study  in 1/5/2021 which showed a medium size area of mild ischemia in the anterior wall.  His blood pressure was quite high during exercise.  He went on for cardiac catheterization done 1/15/2021 which showed minimal left main calcification, calcification LAD with a mid vessel stent that was widely patent and no other stenosis, patent circumflex, patent large first obtuse marginal branch and patent dominant RCA-medically managed.     He had atrial fibrillation on 10/4/2021.  He was started on metoprolol and Eliquis and then referred him to see EP.  He saw Dr. Cortez who said that he may need ablation in the future but for now would just use his metoprolol and Eliquis.  His metoprolol was increased to 50 twice daily because he was still having some breakthrough episodes.  When he has atrial fibrillation, it lasts about 1 hour, he usually takes an extra metoprolol and feels better.      He has multilevel cervical spondylosis with neuroforaminal narrowing at C4-C5, but worse at C5-C6 and C6-C7.  He is having left arm weakness.  He needs anterior cervical discectomy and fusion which is being scheduled with Dr. Prado - had this 11/4/2024.     He had a nonischemic stress nuclear perfusion study done 10/11/2024.  Echocardiogram done 10/11/2024 showed ejection action 59% with normal diastolic function, intra-atrial septal aneurysm present, mild tricuspid insufficiency.    He had atrial fibrillation going into surgery but has had no atrial fibrillation since that time.  He has no orthopnea or PND.  He has no difficulty breathing.  He does not feels heart racing or skipping.  He has no exertional chest sinus or pressure.  He is starting to get strength back in his arm.    Past Medical History:   Diagnosis Date    Arthritis     OA of knees    Cervical spondylosis without myelopathy 09/05/2024    LEFT ARM WEAKNESS    Coronary artery disease     STENT X 1    COVID-19 11/2020    Current use of long term anticoagulation     A-FIB:   ELIQUIS    Dislocation, shoulder 5x-Bankart Repair    Hyperlipidemia     Hypertension     Knee swelling 18-24 months    Left bundle branch block     Paroxysmal A-fib     ELIQUIS    Strain of lumbar region 07/03/2018         Past Surgical History:   Procedure Laterality Date    ADENOIDECTOMY      ANTERIOR CERVICAL DISCECTOMY W/ FUSION Bilateral 11/4/2024    Procedure: Anterior cervical discectomy and fusion at cervical 4 cervical 5, cervical 5 cervical 6, cervical 6 cervical 7 with allograft (cadaver) bone fusion with 0 profile interbody mechanical device at each level;  Surgeon: Alexis Prado MD;  Location: [facility] OR;  Service: Neurosurgery;  Laterality: Bilateral;    CARDIAC CATHETERIZATION N/A 08/10/2017    by Eneida Black MD; Left main normal, LAD scattered 10% proximal stenosis, mid LAD 95% concentric stenosis near focal area of calcification, left circumflex normal, first obtuse marginal branch proximal 10% stenosis, and right coronary artery with a 10% proximal and a 30% mid stenosis    CARDIAC CATHETERIZATION N/A 08/10/2017    by Eneida Black MD; Xience Alpine drug-eluting stent placement 3.5×18 mm to the mid LAD      CARDIAC CATHETERIZATION N/A 01/15/2021    Procedure: Coronary angiography;  Surgeon: Emile Kraues MD;  Location: [facility] INVASIVE LOCATION;  Service: Cardiovascular;  Laterality: N/A;    CARDIAC CATHETERIZATION N/A 01/15/2021    Procedure: Left Heart Cath;  Surgeon: Emile Krause MD;  Location: [facility] INVASIVE LOCATION;  Service: Cardiovascular;  Laterality: N/A;    CARDIAC CATHETERIZATION N/A 01/15/2021    Procedure: Left ventriculography;  Surgeon: Emile Krause MD;  Location: [facility] CATH INVASIVE LOCATION;  Service: Cardiovascular;  Laterality: N/A;    COLONOSCOPY W/ POLYPECTOMY N/A 05/23/2022    Procedure: COLONOSCOPY WITH POLYPECTOMY;  Surgeon: Todd Craig MD;  Location: [facility] OR;  Service: Gastroenterology;  Laterality:  "N/A;  Sigmoid polyp x 1    CORONARY ANGIOPLASTY WITH STENT PLACEMENT      LAD    SHOULDER SURGERY Left     SCOPE    TONSILLECTOMY         Current Outpatient Medications on File Prior to Visit   Medication Sig Dispense Refill    apixaban (Eliquis) 5 MG tablet tablet Take 1 tablet by mouth Every 12 (Twelve) Hours.      metoprolol tartrate (LOPRESSOR) 50 MG tablet Take 1 tablet by mouth 2 (Two) Times a Day. 180 tablet 2    rosuvastatin (CRESTOR) 40 MG tablet Take 1 tablet by mouth Every Night. 90 tablet 3    cyclobenzaprine (FLEXERIL) 10 MG tablet Take 1 tablet by mouth 3 (Three) Times a Day As Needed for Muscle Spasms. 60 tablet 3     No current facility-administered medications on file prior to visit.       Social History     Socioeconomic History    Marital status:    Tobacco Use    Smoking status: Never     Passive exposure: Never    Smokeless tobacco: Never   Vaping Use    Vaping status: Never Used   Substance and Sexual Activity    Alcohol use: Yes     Alcohol/week: 1.0 - 2.0 standard drink of alcohol     Types: 1 - 2 Cans of beer per week     Comment: caffeine use: 5-6 cups daily; VERY RARELY DRINKS    Drug use: No    Sexual activity: Defer             Procedures    ECG 12 Lead    Date/Time: 12/6/2024 12:01 PM  Performed by: Paula Hall MD    Authorized by: Paula Hall MD  Comparison: compared with previous ECG   Similar to previous ECG  Rhythm: sinus rhythm  Conduction: non-specific intraventricular conduction delay    Clinical impression: abnormal EKG              Objective:      Vitals:    12/06/24 1153   BP: 114/60   Pulse: (!) 49   Weight: 82.6 kg (182 lb)   Height: 182.9 cm (72\")     Body mass index is 24.68 kg/m².    Vitals reviewed.   Constitutional:       General: Not in acute distress.     Appearance: Not diaphoretic.   Neck:      Vascular: No carotid bruit or JVD.   Pulmonary:      Effort: Pulmonary effort is normal.      Breath sounds: Normal breath sounds. "   Cardiovascular:      Bradycardia present. Regular rhythm.      Murmurs: There is no murmur.      No gallop.  No rub.   Pulses:     Intact distal pulses.      Carotid: 2+ bilaterally.     Radial: 2+ bilaterally.     Dorsalis pedis: 2+ bilaterally.     Posterior tibial: 2+ bilaterally.  Edema:     Peripheral edema absent.   Neurological:      Cranial Nerves: No cranial nerve deficit.         Lab Review:       Assessment:      Diagnosis Plan   1. Coronary artery disease involving native coronary artery of native heart without angina pectoris        2. S/P coronary artery stent placement        3. Mixed hyperlipidemia        4. Paroxysmal atrial fibrillation        5. LBBB (left bundle branch block)          CAD, s/p LAD stent 8/10/17, with presentation of unstable angina.  He has no angina.  Normal stress nuclear perfusion study done 10/2024.  Hyperlipidemia, treated.  Is on rosuvastatin which is easier for him to swallow.  LBBB on ECG.   High stress job, has made changes.  Walks for exercise.  Atrial fibrillation, paroxysmal, continue Eliquis and metoprolol tartrate 50 mg twice daily.  He may take an extra metoprolol for atrial fibrillation that lasts more than 30 minutes.   Status post anterior cervical spine fusion done 11/4/2024.     Plan:       See Wendi in 1 year, no medication changes.  If he continues to have intermittent atrial fibrillation, we will send him for potential ablation.

## 2024-12-12 NOTE — PROGRESS NOTES
"Subjective   History of Present Illness: Abram Allen is a 59 y.o. male is here today for POST OP FOLLOW UP WITH XRAY IN DEPT AND AFTER PHYSICAL THERAPY.  He has done well with his therapy techniques thinks he is getting back shoulder function although he does have a tremendous amount of atrophy of the left shoulder.  He still has no numbness and his pain is nonexistent at this point.    History of Present Illness    Tobacco Use: Low Risk  (12/19/2024)    Patient History     Smoking Tobacco Use: Never     Smokeless Tobacco Use: Never     Passive Exposure: Never        The following portions of the patient's history were reviewed and updated as appropriate: allergies, current medications, past family history, past medical history, past social history, past surgical history and problem list.    Review of Systems   Constitutional:  Negative for fever.   Musculoskeletal:  Negative for neck pain and neck stiffness.   Neurological:  Positive for weakness. Negative for numbness.   All other systems reviewed and are negative.      Objective     Vitals:    12/19/24 1258   BP: 120/62   BP Location: Left arm   Patient Position: Sitting   Cuff Size: Adult   Resp: 20   Weight: 82.6 kg (182 lb)   Height: 182.9 cm (72.01\")   PainSc: 0-No pain     Body mass index is 24.68 kg/m².      Physical Exam  Neurological Exam    Physical Exam:    CONSTITUTIONAL:  appears well developed, well-nourished and in no acute distress.    NECK: the neck is supple and symmetric. The trachea is midline with no masses.      PULMONARY: Respiratory effort is normal with no increased work of breathing or signs of respiratory distress.    CARDIOVASCULAR: Pedal pulses are +2/4 bilaterally. Examination of the extremities shows no edema or varicosities.    MUSCULOSKELETAL: Gait normal    SKIN: The skin is warm, dry and intact.  Anterior neck incision is intact without erythema, induration or drainage    NEUROLOGIC:   Normal motor strength noted except the " left shoulder which is 3+/5 strength to my confrontational exam today. Muscle bulk and tone are normal.  Sensory exam is normal to fine touch  Reflexes are diminished at the bicep, tricep, brachioradialis but symmetrical  Smith's negative  Cortical function is intact and without deficits. Speech is normal.    PSYCHIATRIC: oriented to person, place and time. Patient's mood and affect are normal.    Assessment & Plan   Independent Review of Radiographic Studies:      I personally reviewed the images from the following studies.    RADIOLOGY IMAGING: XR SPINE CERVICAL COMPLETE 4 OR 5 VW: completed on Date: 12/19/24 at [facility] Neurosurgery Office:  INDICATION: Routine postop evaluation.  Findings: Anterior interbody fusion devices seen at C4-C5, C5-C6, and C6-C7 with stable appearance when compared to IntraOp fluoroscopy.. Comparison:   IntraOp fluoroscopy on November 4, 2024    Medical Decision Making:      From the surgery perspective the healing has progressed to allow resumption of normal preoperative activity including work. We encouraged continuation of the therapy exercises long term. For now we will plan on follow up only as needed in the future and would be happy to re-evaluate at any time.     Return for any new or recurrent neurosurgical problems.    Diagnoses and all orders for this visit:    1. S/P neck surgery, follow-up exam (Primary)    2. Cervical spondylosis without myelopathy    3. Left arm weakness             Alexis Prado MD FACS [facility]  Neurological Surgery

## 2024-12-19 ENCOUNTER — OFFICE VISIT (OUTPATIENT)
Dept: NEUROSURGERY | Facility: CLINIC | Age: 59
End: 2024-12-19
Payer: COMMERCIAL

## 2024-12-19 ENCOUNTER — HOSPITAL ENCOUNTER (OUTPATIENT)
Dept: GENERAL RADIOLOGY | Facility: HOSPITAL | Age: 59
Discharge: HOME | End: 2024-12-19
Admitting: NEUROLOGICAL SURGERY
Payer: COMMERCIAL

## 2024-12-19 VITALS
SYSTOLIC BLOOD PRESSURE: 120 MMHG | HEIGHT: 72 IN | DIASTOLIC BLOOD PRESSURE: 62 MMHG | BODY MASS INDEX: 24.65 KG/M2 | WEIGHT: 182 LBS | RESPIRATION RATE: 20 BRPM

## 2024-12-19 DIAGNOSIS — Z09 S/P NECK SURGERY, FOLLOW-UP EXAM: ICD-10-CM

## 2024-12-19 DIAGNOSIS — R29.898 LEFT ARM WEAKNESS: ICD-10-CM

## 2024-12-19 DIAGNOSIS — M47.812 CERVICAL SPONDYLOSIS WITHOUT MYELOPATHY: ICD-10-CM

## 2024-12-19 DIAGNOSIS — Z09 S/P NECK SURGERY, FOLLOW-UP EXAM: Primary | ICD-10-CM

## 2024-12-19 PROCEDURE — 99024 POSTOP FOLLOW-UP VISIT: CPT | Performed by: NEUROLOGICAL SURGERY

## 2024-12-19 PROCEDURE — 72050 X-RAY EXAM NECK SPINE 4/5VWS: CPT

## 2024-12-19 NOTE — TELEPHONE ENCOUNTER
Rx Refill Note  Requested Prescriptions     Pending Prescriptions Disp Refills    apixaban (Eliquis) 5 MG tablet tablet 60 tablet      Sig: Take 1 tablet by mouth Every 12 (Twelve) Hours.      Last office visit with prescribing clinician: 11/22/2024   Last telemedicine visit with prescribing clinician: Visit date not found   Next office visit with prescribing clinician: 12/19/2024                         Would you like a call back once the refill request has been completed: [] Yes [] No    If the office needs to give you a call back, can they leave a voicemail: [] Yes [] No    Pamela Zhou MA  12/19/24, 09:04 EST

## 2025-05-08 RX ORDER — METOPROLOL TARTRATE 50 MG
50 TABLET ORAL 2 TIMES DAILY
Qty: 180 TABLET | Refills: 2 | Status: SHIPPED | OUTPATIENT
Start: 2025-05-08

## (undated) DEVICE — SAFELINER SUCTION CANISTER 1000CC: Brand: DEROYAL

## (undated) DEVICE — ACCY PA700 LUBRICANT DIFFUSER MR7 4 PACK: Brand: MIDAS REX

## (undated) DEVICE — CATH VENT MIV RADL PIG ST TIP 5F 110CM

## (undated) DEVICE — GLV SURG SENSICARE PI MIC PF SZ7.5 LF STRL

## (undated) DEVICE — TOOL MR8-14MH30 MR8 14CM MATCH 3MM: Brand: MIDAS REX MR8

## (undated) DEVICE — CATH DIAG IMPULSE FR4 6F 100CM

## (undated) DEVICE — CATH DIAG IMPULSE PIG145 6F 110CM

## (undated) DEVICE — KARLIN BLADE; MICRO KNIFE; SATIN; STAINLESS STEEL; 2.0 MM TIP; 4 IN; SINGLE USE; (10/PK): Brand: SYMMETRY SURGICAL

## (undated) DEVICE — SUT SILK 2/0 TIES 18IN A185H

## (undated) DEVICE — LIMB HOLDER, WRIST/ANKLE: Brand: DEROYAL

## (undated) DEVICE — SKIN PREP TRAY W/CHG: Brand: MEDLINE INDUSTRIES, INC.

## (undated) DEVICE — GLIDESHEATH BASIC HYDROPHILIC COATED INTRODUCER SHEATH: Brand: GLIDESHEATH

## (undated) DEVICE — COLR CERV FM 3IN LG

## (undated) DEVICE — NC TREK CORONARY DILATATION CATHETER 3.5 MM X 15 MM / RAPID-EXCHANGE: Brand: NC TREK

## (undated) DEVICE — KT ORCA ORCAPOD DISP STRL

## (undated) DEVICE — ELECTRD BLD EZ CLN MOD XLNG 2.75IN

## (undated) DEVICE — TOOL MR8-14HM126 MR8 14CM HM 12.6MM: Brand: MIDAS REX MR8

## (undated) DEVICE — SUT VIC 3/0 X1 27IN J458H

## (undated) DEVICE — CATH DIAG IMPULSE FR4 5F 100CM

## (undated) DEVICE — MARKER,SKIN,WI/RULER AND LABELS: Brand: MEDLINE

## (undated) DEVICE — DRSNG WND BORDR/ADHS NONADHR/GZ LF 4X4IN STRL

## (undated) DEVICE — JACKT LAB F/R KNIT CUFF/COLR XLG BLU

## (undated) DEVICE — PK CATH CARD 40

## (undated) DEVICE — Device

## (undated) DEVICE — NEEDLE, QUINCKE, 18GX3.5": Brand: MEDLINE

## (undated) DEVICE — 3M™ STERI-STRIP™ ANTIMICROBIAL SKIN CLOSURES 1/2 INCH X 4 INCHES 50/CARTON 4 CARTONS/CASE A1847: Brand: 3M™ STERI-STRIP™

## (undated) DEVICE — CATH DIAG IMPULSE FL3.5 6F 100CM

## (undated) DEVICE — GW EMR FIX EXCHG J STD .035 3MM 260CM

## (undated) DEVICE — TOOL MR8-10CY60BR MR8 10CM CYL BARL 6MM: Brand: MIDAS REX MR8

## (undated) DEVICE — KT MANIFLD CARDIAC

## (undated) DEVICE — GLV SURG BIOGEL LTX PF 8

## (undated) DEVICE — PIN DISTRACT TI 14MM STRL

## (undated) DEVICE — DISPOSABLE BIPOLAR CABLE 12FT. (3.6M): Brand: KIRWAN

## (undated) DEVICE — GLV SURG SENSICARE PI LF PF 7.5 GRN STRL

## (undated) DEVICE — ANTIBACTERIAL UNDYED BRAIDED (POLYGLACTIN 910), SYNTHETIC ABSORBABLE SUTURE: Brand: COATED VICRYL

## (undated) DEVICE — CATH DIAG IMPULSE FL3.5 5F 100CM

## (undated) DEVICE — COVER,C-ARM,41X74: Brand: MEDLINE

## (undated) DEVICE — VIAL FORMALIN CAP 10P 40ML

## (undated) DEVICE — ADAPT CLN BIOGUARD AIR/H2O DISP

## (undated) DEVICE — SYR LL TP 10ML STRL

## (undated) DEVICE — CONN TBG Y 5 IN 1 LF STRL

## (undated) DEVICE — SPNG GZ WOVN 4X4IN 12PLY 10/BX STRL

## (undated) DEVICE — TREK CORONARY DILATATION CATHETER 3.0 MM X 12 MM / RAPID-EXCHANGE: Brand: TREK

## (undated) DEVICE — BND PRESS RADL COMFRT 14IN STRL

## (undated) DEVICE — DRP MICROSCP LEICA 137X381CM

## (undated) DEVICE — LABEL SHEET CUSTOM 2X2 YELLOW: Brand: MEDLINE INDUSTRIES, INC.

## (undated) DEVICE — SYR LL 3CC

## (undated) DEVICE — GC 5F 056 XB LAD 3.5 LBT: Brand: BRITE TIP

## (undated) DEVICE — BW-412T DISP COMBO CLEANING BRUSH: Brand: SINGLE USE COMBINATION CLEANING BRUSH

## (undated) DEVICE — DEV INDEFLATOR

## (undated) DEVICE — GLV SURG BIOGEL LTX PF 7 1/2

## (undated) DEVICE — HI-TORQUE BALANCE MIDDLEWEIGHT GUIDE WIRE .014 STRAIGHT TIP 3.0 CM X 190 CM: Brand: HI-TORQUE BALANCE MIDDLEWEIGHT

## (undated) DEVICE — COTTON BALLS, DOUBLE STRUNG: Brand: DEROYAL

## (undated) DEVICE — ADHS LIQ MASTISOL 2/3ML

## (undated) DEVICE — SPONGE,DISSECTOR,K,XRAY,9/16"X1/4",STRL: Brand: MEDLINE

## (undated) DEVICE — PK NEURO SPINE 40

## (undated) DEVICE — BIT DRL QC W/STOP 2X14MM

## (undated) DEVICE — GLV SURG SIGNATURE ESSENTIAL PF LTX SZ7.5

## (undated) DEVICE — HYBRID TUBING/CAP SET FOR OLYMPUS® SCOPES: Brand: ERBE

## (undated) DEVICE — FRCP BX RADJAW4 NDL 2.8 240CM LG OG BX40